# Patient Record
Sex: MALE | Race: WHITE | NOT HISPANIC OR LATINO | Employment: FULL TIME | URBAN - METROPOLITAN AREA
[De-identification: names, ages, dates, MRNs, and addresses within clinical notes are randomized per-mention and may not be internally consistent; named-entity substitution may affect disease eponyms.]

---

## 2022-10-03 ENCOUNTER — APPOINTMENT (OUTPATIENT)
Dept: CARDIOLOGY | Facility: MEDICAL CENTER | Age: 52
DRG: 246 | End: 2022-10-03
Attending: PHYSICIAN ASSISTANT
Payer: COMMERCIAL

## 2022-10-03 ENCOUNTER — HOSPITAL ENCOUNTER (INPATIENT)
Facility: MEDICAL CENTER | Age: 52
LOS: 2 days | DRG: 246 | End: 2022-10-05
Attending: EMERGENCY MEDICINE | Admitting: INTERNAL MEDICINE
Payer: COMMERCIAL

## 2022-10-03 ENCOUNTER — APPOINTMENT (OUTPATIENT)
Dept: CARDIOLOGY | Facility: MEDICAL CENTER | Age: 52
DRG: 246 | End: 2022-10-03
Payer: COMMERCIAL

## 2022-10-03 ENCOUNTER — APPOINTMENT (OUTPATIENT)
Dept: RADIOLOGY | Facility: MEDICAL CENTER | Age: 52
DRG: 246 | End: 2022-10-03
Payer: COMMERCIAL

## 2022-10-03 DIAGNOSIS — I21.11 ST ELEVATION MYOCARDIAL INFARCTION INVOLVING RIGHT CORONARY ARTERY (HCC): ICD-10-CM

## 2022-10-03 DIAGNOSIS — I21.3 ST ELEVATION MYOCARDIAL INFARCTION (STEMI), UNSPECIFIED ARTERY (HCC): ICD-10-CM

## 2022-10-03 DIAGNOSIS — R07.9 ACUTE CHEST PAIN: ICD-10-CM

## 2022-10-03 DIAGNOSIS — F17.200 TOBACCO DEPENDENCE: ICD-10-CM

## 2022-10-03 PROBLEM — R73.9 HYPERGLYCEMIA: Status: ACTIVE | Noted: 2022-10-03

## 2022-10-03 LAB
ACT BLD: 242 SEC (ref 74–137)
ACT BLD: 312 SEC (ref 74–137)
ALBUMIN SERPL BCP-MCNC: 4.3 G/DL (ref 3.2–4.9)
ALBUMIN/GLOB SERPL: 1.7 G/DL
ALP SERPL-CCNC: 99 U/L (ref 30–99)
ALT SERPL-CCNC: 15 U/L (ref 2–50)
ANION GAP SERPL CALC-SCNC: 16 MMOL/L (ref 7–16)
ANION GAP SERPL CALC-SCNC: 18 MMOL/L (ref 7–16)
APPEARANCE UR: CLEAR
APTT PPP: 30 SEC (ref 24.7–36)
AST SERPL-CCNC: 29 U/L (ref 12–45)
B-OH-BUTYR SERPL-MCNC: 0.45 MMOL/L (ref 0.02–0.27)
BACTERIA #/AREA URNS HPF: NEGATIVE /HPF
BASOPHILS # BLD AUTO: 0.9 % (ref 0–1.8)
BASOPHILS # BLD: 0.03 K/UL (ref 0–0.12)
BILIRUB SERPL-MCNC: 0.7 MG/DL (ref 0.1–1.5)
BILIRUB UR QL STRIP.AUTO: NEGATIVE
BUN SERPL-MCNC: 16 MG/DL (ref 8–22)
BUN SERPL-MCNC: 17 MG/DL (ref 8–22)
CALCIUM SERPL-MCNC: 9 MG/DL (ref 8.5–10.5)
CALCIUM SERPL-MCNC: 9.1 MG/DL (ref 8.5–10.5)
CHLORIDE SERPL-SCNC: 104 MMOL/L (ref 96–112)
CHLORIDE SERPL-SCNC: 98 MMOL/L (ref 96–112)
CO2 SERPL-SCNC: 15 MMOL/L (ref 20–33)
CO2 SERPL-SCNC: 16 MMOL/L (ref 20–33)
COLOR UR: YELLOW
CREAT SERPL-MCNC: 0.7 MG/DL (ref 0.5–1.4)
CREAT SERPL-MCNC: 0.85 MG/DL (ref 0.5–1.4)
EKG IMPRESSION: NORMAL
EOSINOPHIL # BLD AUTO: 0.04 K/UL (ref 0–0.51)
EOSINOPHIL NFR BLD: 1.2 % (ref 0–6.9)
EPI CELLS #/AREA URNS HPF: NEGATIVE /HPF
ERYTHROCYTE [DISTWIDTH] IN BLOOD BY AUTOMATED COUNT: 42.5 FL (ref 35.9–50)
EST. AVERAGE GLUCOSE BLD GHB EST-MCNC: 183 MG/DL
GFR SERPLBLD CREATININE-BSD FMLA CKD-EPI: 103 ML/MIN/1.73 M 2
GFR SERPLBLD CREATININE-BSD FMLA CKD-EPI: 109 ML/MIN/1.73 M 2
GLOBULIN SER CALC-MCNC: 2.6 G/DL (ref 1.9–3.5)
GLUCOSE BLD STRIP.AUTO-MCNC: 276 MG/DL (ref 65–99)
GLUCOSE BLD STRIP.AUTO-MCNC: 291 MG/DL (ref 65–99)
GLUCOSE BLD STRIP.AUTO-MCNC: 294 MG/DL (ref 65–99)
GLUCOSE BLD STRIP.AUTO-MCNC: 299 MG/DL (ref 65–99)
GLUCOSE SERPL-MCNC: 262 MG/DL (ref 65–99)
GLUCOSE SERPL-MCNC: 391 MG/DL (ref 65–99)
GLUCOSE UR STRIP.AUTO-MCNC: >=1000 MG/DL
HBA1C MFR BLD: 8 % (ref 4–5.6)
HCT VFR BLD AUTO: 44.7 % (ref 42–52)
HGB BLD-MCNC: 16.5 G/DL (ref 14–18)
HYALINE CASTS #/AREA URNS LPF: ABNORMAL /LPF
IMM GRANULOCYTES # BLD AUTO: 0.01 K/UL (ref 0–0.11)
IMM GRANULOCYTES NFR BLD AUTO: 0.3 % (ref 0–0.9)
INR PPP: 0.96 (ref 0.87–1.13)
KETONES UR STRIP.AUTO-MCNC: 40 MG/DL
LACTATE SERPL-SCNC: 2.9 MMOL/L (ref 0.5–2)
LEUKOCYTE ESTERASE UR QL STRIP.AUTO: NEGATIVE
LIPASE SERPL-CCNC: 76 U/L (ref 11–82)
LV EJECT FRACT  99904: 60
LV EJECT FRACT MOD 2C 99903: 59.28
LV EJECT FRACT MOD 4C 99902: 62.52
LV EJECT FRACT MOD BP 99901: 59.97
LYMPHOCYTES # BLD AUTO: 1.57 K/UL (ref 1–4.8)
LYMPHOCYTES NFR BLD: 47.7 % (ref 22–41)
MAGNESIUM SERPL-MCNC: 1.8 MG/DL (ref 1.5–2.5)
MCH RBC QN AUTO: 31.1 PG (ref 27–33)
MCHC RBC AUTO-ENTMCNC: 36.9 G/DL (ref 33.7–35.3)
MCV RBC AUTO: 84.2 FL (ref 81.4–97.8)
MICRO URNS: ABNORMAL
MONOCYTES # BLD AUTO: 0.57 K/UL (ref 0–0.85)
MONOCYTES NFR BLD AUTO: 17.3 % (ref 0–13.4)
NEUTROPHILS # BLD AUTO: 1.07 K/UL (ref 1.82–7.42)
NEUTROPHILS NFR BLD: 32.6 % (ref 44–72)
NITRITE UR QL STRIP.AUTO: NEGATIVE
NRBC # BLD AUTO: 0 K/UL
NRBC BLD-RTO: 0 /100 WBC
NT-PROBNP SERPL IA-MCNC: 21 PG/ML (ref 0–125)
PH UR STRIP.AUTO: 6 [PH] (ref 5–8)
PHOSPHATE SERPL-MCNC: 2.1 MG/DL (ref 2.5–4.5)
PLATELET # BLD AUTO: 186 K/UL (ref 164–446)
PMV BLD AUTO: 10.7 FL (ref 9–12.9)
POTASSIUM SERPL-SCNC: 4 MMOL/L (ref 3.6–5.5)
POTASSIUM SERPL-SCNC: 4.1 MMOL/L (ref 3.6–5.5)
PROT SERPL-MCNC: 6.9 G/DL (ref 6–8.2)
PROT UR QL STRIP: NEGATIVE MG/DL
PROTHROMBIN TIME: 12.7 SEC (ref 12–14.6)
RBC # BLD AUTO: 5.31 M/UL (ref 4.7–6.1)
RBC # URNS HPF: ABNORMAL /HPF
RBC UR QL AUTO: ABNORMAL
SODIUM SERPL-SCNC: 132 MMOL/L (ref 135–145)
SODIUM SERPL-SCNC: 135 MMOL/L (ref 135–145)
SP GR UR STRIP.AUTO: >=1.045
TROPONIN T SERPL-MCNC: 1672 NG/L (ref 6–19)
TROPONIN T SERPL-MCNC: 18 NG/L (ref 6–19)
UROBILINOGEN UR STRIP.AUTO-MCNC: 1 MG/DL
WBC # BLD AUTO: 3.3 K/UL (ref 4.8–10.8)
WBC #/AREA URNS HPF: ABNORMAL /HPF

## 2022-10-03 PROCEDURE — B240ZZ3 ULTRASONOGRAPHY OF SINGLE CORONARY ARTERY, INTRAVASCULAR: ICD-10-PCS | Performed by: INTERNAL MEDICINE

## 2022-10-03 PROCEDURE — 99221 1ST HOSP IP/OBS SF/LOW 40: CPT | Performed by: INTERNAL MEDICINE

## 2022-10-03 PROCEDURE — 93005 ELECTROCARDIOGRAM TRACING: CPT | Performed by: EMERGENCY MEDICINE

## 2022-10-03 PROCEDURE — 700111 HCHG RX REV CODE 636 W/ 250 OVERRIDE (IP): Performed by: EMERGENCY MEDICINE

## 2022-10-03 PROCEDURE — 36415 COLL VENOUS BLD VENIPUNCTURE: CPT

## 2022-10-03 PROCEDURE — 85730 THROMBOPLASTIN TIME PARTIAL: CPT

## 2022-10-03 PROCEDURE — 700111 HCHG RX REV CODE 636 W/ 250 OVERRIDE (IP): Performed by: INTERNAL MEDICINE

## 2022-10-03 PROCEDURE — 83690 ASSAY OF LIPASE: CPT

## 2022-10-03 PROCEDURE — 96374 THER/PROPH/DIAG INJ IV PUSH: CPT

## 2022-10-03 PROCEDURE — 700102 HCHG RX REV CODE 250 W/ 637 OVERRIDE(OP): Performed by: HOSPITALIST

## 2022-10-03 PROCEDURE — 700111 HCHG RX REV CODE 636 W/ 250 OVERRIDE (IP)

## 2022-10-03 PROCEDURE — 82962 GLUCOSE BLOOD TEST: CPT | Mod: 91

## 2022-10-03 PROCEDURE — 84100 ASSAY OF PHOSPHORUS: CPT

## 2022-10-03 PROCEDURE — 93458 L HRT ARTERY/VENTRICLE ANGIO: CPT | Mod: 26,59 | Performed by: INTERNAL MEDICINE

## 2022-10-03 PROCEDURE — 93010 ELECTROCARDIOGRAM REPORT: CPT | Mod: 59 | Performed by: INTERNAL MEDICINE

## 2022-10-03 PROCEDURE — 96375 TX/PRO/DX INJ NEW DRUG ADDON: CPT

## 2022-10-03 PROCEDURE — 93010 ELECTROCARDIOGRAM REPORT: CPT | Mod: 59,76 | Performed by: INTERNAL MEDICINE

## 2022-10-03 PROCEDURE — 71045 X-RAY EXAM CHEST 1 VIEW: CPT

## 2022-10-03 PROCEDURE — A9270 NON-COVERED ITEM OR SERVICE: HCPCS | Performed by: INTERNAL MEDICINE

## 2022-10-03 PROCEDURE — 83735 ASSAY OF MAGNESIUM: CPT

## 2022-10-03 PROCEDURE — 700117 HCHG RX CONTRAST REV CODE 255: Performed by: INTERNAL MEDICINE

## 2022-10-03 PROCEDURE — 85025 COMPLETE CBC W/AUTO DIFF WBC: CPT

## 2022-10-03 PROCEDURE — 92978 ENDOLUMINL IVUS OCT C 1ST: CPT | Mod: 26,RC | Performed by: INTERNAL MEDICINE

## 2022-10-03 PROCEDURE — 82010 KETONE BODYS QUAN: CPT

## 2022-10-03 PROCEDURE — B2151ZZ FLUOROSCOPY OF LEFT HEART USING LOW OSMOLAR CONTRAST: ICD-10-PCS | Performed by: INTERNAL MEDICINE

## 2022-10-03 PROCEDURE — 0270356 DILATION OF CORONARY ARTERY, ONE ARTERY, BIFURCATION, WITH TWO DRUG-ELUTING INTRALUMINAL DEVICES, PERCUTANEOUS APPROACH: ICD-10-PCS | Performed by: INTERNAL MEDICINE

## 2022-10-03 PROCEDURE — 700102 HCHG RX REV CODE 250 W/ 637 OVERRIDE(OP)

## 2022-10-03 PROCEDURE — 93306 TTE W/DOPPLER COMPLETE: CPT | Mod: 26 | Performed by: INTERNAL MEDICINE

## 2022-10-03 PROCEDURE — 85610 PROTHROMBIN TIME: CPT

## 2022-10-03 PROCEDURE — 700101 HCHG RX REV CODE 250

## 2022-10-03 PROCEDURE — 770020 HCHG ROOM/CARE - TELE (206)

## 2022-10-03 PROCEDURE — 84484 ASSAY OF TROPONIN QUANT: CPT | Mod: 91

## 2022-10-03 PROCEDURE — 83605 ASSAY OF LACTIC ACID: CPT

## 2022-10-03 PROCEDURE — 93005 ELECTROCARDIOGRAM TRACING: CPT | Performed by: INTERNAL MEDICINE

## 2022-10-03 PROCEDURE — 700102 HCHG RX REV CODE 250 W/ 637 OVERRIDE(OP): Performed by: INTERNAL MEDICINE

## 2022-10-03 PROCEDURE — 99285 EMERGENCY DEPT VISIT HI MDM: CPT

## 2022-10-03 PROCEDURE — C1769 GUIDE WIRE: HCPCS

## 2022-10-03 PROCEDURE — 99152 MOD SED SAME PHYS/QHP 5/>YRS: CPT | Performed by: INTERNAL MEDICINE

## 2022-10-03 PROCEDURE — 83880 ASSAY OF NATRIURETIC PEPTIDE: CPT

## 2022-10-03 PROCEDURE — 92929 PR PRQ TRLUML CORONARY STENT W/ANGIO ADDL ART/BRNCH: CPT | Mod: RC | Performed by: INTERNAL MEDICINE

## 2022-10-03 PROCEDURE — 4A023N7 MEASUREMENT OF CARDIAC SAMPLING AND PRESSURE, LEFT HEART, PERCUTANEOUS APPROACH: ICD-10-PCS | Performed by: INTERNAL MEDICINE

## 2022-10-03 PROCEDURE — A9270 NON-COVERED ITEM OR SERVICE: HCPCS

## 2022-10-03 PROCEDURE — A9270 NON-COVERED ITEM OR SERVICE: HCPCS | Performed by: HOSPITALIST

## 2022-10-03 PROCEDURE — 81001 URINALYSIS AUTO W/SCOPE: CPT

## 2022-10-03 PROCEDURE — 85347 COAGULATION TIME ACTIVATED: CPT

## 2022-10-03 PROCEDURE — 93306 TTE W/DOPPLER COMPLETE: CPT

## 2022-10-03 PROCEDURE — B2111ZZ FLUOROSCOPY OF MULTIPLE CORONARY ARTERIES USING LOW OSMOLAR CONTRAST: ICD-10-PCS | Performed by: INTERNAL MEDICINE

## 2022-10-03 PROCEDURE — 80048 BASIC METABOLIC PNL TOTAL CA: CPT

## 2022-10-03 PROCEDURE — 83036 HEMOGLOBIN GLYCOSYLATED A1C: CPT

## 2022-10-03 PROCEDURE — 92941 PRQ TRLML REVSC TOT OCCL AMI: CPT | Mod: RC | Performed by: INTERNAL MEDICINE

## 2022-10-03 PROCEDURE — 80053 COMPREHEN METABOLIC PANEL: CPT

## 2022-10-03 RX ORDER — CARVEDILOL 6.25 MG/1
6.25 TABLET ORAL 2 TIMES DAILY WITH MEALS
Status: DISCONTINUED | OUTPATIENT
Start: 2022-10-03 | End: 2022-10-05

## 2022-10-03 RX ORDER — HEPARIN SODIUM 1000 [USP'U]/ML
INJECTION, SOLUTION INTRAVENOUS; SUBCUTANEOUS
Status: COMPLETED
Start: 2022-10-03 | End: 2022-10-03

## 2022-10-03 RX ORDER — ONDANSETRON 4 MG/1
4 TABLET, ORALLY DISINTEGRATING ORAL EVERY 4 HOURS PRN
Status: DISCONTINUED | OUTPATIENT
Start: 2022-10-03 | End: 2022-10-05

## 2022-10-03 RX ORDER — NICOTINE 21 MG/24HR
21 PATCH, TRANSDERMAL 24 HOURS TRANSDERMAL
Status: DISCONTINUED | OUTPATIENT
Start: 2022-10-03 | End: 2022-10-05 | Stop reason: HOSPADM

## 2022-10-03 RX ORDER — LIDOCAINE HYDROCHLORIDE 20 MG/ML
INJECTION, SOLUTION INFILTRATION; PERINEURAL
Status: COMPLETED
Start: 2022-10-03 | End: 2022-10-03

## 2022-10-03 RX ORDER — MIDAZOLAM HYDROCHLORIDE 1 MG/ML
INJECTION INTRAMUSCULAR; INTRAVENOUS
Status: COMPLETED
Start: 2022-10-03 | End: 2022-10-03

## 2022-10-03 RX ORDER — LISINOPRIL 5 MG/1
5 TABLET ORAL
Status: DISCONTINUED | OUTPATIENT
Start: 2022-10-03 | End: 2022-10-05

## 2022-10-03 RX ORDER — PROCHLORPERAZINE EDISYLATE 5 MG/ML
5-10 INJECTION INTRAMUSCULAR; INTRAVENOUS EVERY 4 HOURS PRN
Status: DISCONTINUED | OUTPATIENT
Start: 2022-10-03 | End: 2022-10-05 | Stop reason: HOSPADM

## 2022-10-03 RX ORDER — BISACODYL 10 MG
10 SUPPOSITORY, RECTAL RECTAL
Status: DISCONTINUED | OUTPATIENT
Start: 2022-10-03 | End: 2022-10-05

## 2022-10-03 RX ORDER — NITROGLYCERIN 0.4 MG/1
0.4 TABLET SUBLINGUAL
Status: DISCONTINUED | OUTPATIENT
Start: 2022-10-03 | End: 2022-10-05 | Stop reason: HOSPADM

## 2022-10-03 RX ORDER — ONDANSETRON 2 MG/ML
4 INJECTION INTRAMUSCULAR; INTRAVENOUS EVERY 4 HOURS PRN
Status: DISCONTINUED | OUTPATIENT
Start: 2022-10-03 | End: 2022-10-05 | Stop reason: HOSPADM

## 2022-10-03 RX ORDER — PRASUGREL 10 MG/1
10 TABLET, FILM COATED ORAL DAILY
Status: DISCONTINUED | OUTPATIENT
Start: 2022-10-04 | End: 2022-10-05

## 2022-10-03 RX ORDER — ENOXAPARIN SODIUM 100 MG/ML
40 INJECTION SUBCUTANEOUS DAILY
Status: DISCONTINUED | OUTPATIENT
Start: 2022-10-03 | End: 2022-10-05 | Stop reason: HOSPADM

## 2022-10-03 RX ORDER — MORPHINE SULFATE 4 MG/ML
4 INJECTION INTRAVENOUS ONCE
Status: COMPLETED | OUTPATIENT
Start: 2022-10-03 | End: 2022-10-03

## 2022-10-03 RX ORDER — ONDANSETRON 2 MG/ML
4 INJECTION INTRAMUSCULAR; INTRAVENOUS EVERY 4 HOURS PRN
Status: DISCONTINUED | OUTPATIENT
Start: 2022-10-03 | End: 2022-10-03

## 2022-10-03 RX ORDER — VERAPAMIL HYDROCHLORIDE 2.5 MG/ML
INJECTION, SOLUTION INTRAVENOUS
Status: COMPLETED
Start: 2022-10-03 | End: 2022-10-03

## 2022-10-03 RX ORDER — ACETAMINOPHEN 325 MG/1
650 TABLET ORAL EVERY 6 HOURS PRN
Status: DISCONTINUED | OUTPATIENT
Start: 2022-10-03 | End: 2022-10-05

## 2022-10-03 RX ORDER — PRASUGREL 10 MG/1
TABLET, FILM COATED ORAL
Status: COMPLETED
Start: 2022-10-03 | End: 2022-10-03

## 2022-10-03 RX ORDER — ATORVASTATIN CALCIUM 40 MG/1
40 TABLET, FILM COATED ORAL EVERY EVENING
Status: DISCONTINUED | OUTPATIENT
Start: 2022-10-03 | End: 2022-10-04

## 2022-10-03 RX ORDER — DEXTROSE MONOHYDRATE 25 G/50ML
25 INJECTION, SOLUTION INTRAVENOUS
Status: DISCONTINUED | OUTPATIENT
Start: 2022-10-03 | End: 2022-10-05 | Stop reason: HOSPADM

## 2022-10-03 RX ORDER — POLYETHYLENE GLYCOL 3350 17 G/17G
1 POWDER, FOR SOLUTION ORAL
Status: DISCONTINUED | OUTPATIENT
Start: 2022-10-03 | End: 2022-10-05

## 2022-10-03 RX ORDER — HEPARIN SODIUM 200 [USP'U]/100ML
INJECTION, SOLUTION INTRAVENOUS
Status: COMPLETED
Start: 2022-10-03 | End: 2022-10-03

## 2022-10-03 RX ORDER — AMOXICILLIN 250 MG
2 CAPSULE ORAL 2 TIMES DAILY
Status: DISCONTINUED | OUTPATIENT
Start: 2022-10-03 | End: 2022-10-05

## 2022-10-03 RX ORDER — PRASUGREL 10 MG/1
60 TABLET, FILM COATED ORAL ONCE
Status: DISCONTINUED | OUTPATIENT
Start: 2022-10-03 | End: 2022-10-03

## 2022-10-03 RX ORDER — PROMETHAZINE HYDROCHLORIDE 25 MG/1
12.5-25 TABLET ORAL EVERY 4 HOURS PRN
Status: DISCONTINUED | OUTPATIENT
Start: 2022-10-03 | End: 2022-10-05

## 2022-10-03 RX ORDER — PROMETHAZINE HYDROCHLORIDE 12.5 MG/1
12.5-25 SUPPOSITORY RECTAL EVERY 4 HOURS PRN
Status: DISCONTINUED | OUTPATIENT
Start: 2022-10-03 | End: 2022-10-05 | Stop reason: HOSPADM

## 2022-10-03 RX ORDER — PHENYLEPHRINE HCL IN 0.9% NACL 0.5 MG/5ML
SYRINGE (ML) INTRAVENOUS
Status: COMPLETED
Start: 2022-10-03 | End: 2022-10-03

## 2022-10-03 RX ADMIN — CARVEDILOL 6.25 MG: 6.25 TABLET, FILM COATED ORAL at 07:59

## 2022-10-03 RX ADMIN — INSULIN HUMAN 7 UNITS: 100 INJECTION, SOLUTION PARENTERAL at 20:22

## 2022-10-03 RX ADMIN — MORPHINE SULFATE 4 MG: 4 INJECTION, SOLUTION INTRAMUSCULAR; INTRAVENOUS at 04:39

## 2022-10-03 RX ADMIN — HEPARIN SODIUM 2000 UNITS: 200 INJECTION, SOLUTION INTRAVENOUS at 05:15

## 2022-10-03 RX ADMIN — ENOXAPARIN SODIUM 40 MG: 40 INJECTION SUBCUTANEOUS at 06:48

## 2022-10-03 RX ADMIN — HEPARIN SODIUM: 1000 INJECTION, SOLUTION INTRAVENOUS; SUBCUTANEOUS at 05:13

## 2022-10-03 RX ADMIN — FENTANYL CITRATE 100 MCG: 50 INJECTION, SOLUTION INTRAMUSCULAR; INTRAVENOUS at 05:13

## 2022-10-03 RX ADMIN — MIDAZOLAM HYDROCHLORIDE 2 MG: 1 INJECTION, SOLUTION INTRAMUSCULAR; INTRAVENOUS at 05:13

## 2022-10-03 RX ADMIN — ENOXAPARIN SODIUM 40 MG: 40 INJECTION SUBCUTANEOUS at 17:52

## 2022-10-03 RX ADMIN — NICOTINE TRANSDERMAL SYSTEM 21 MG: 21 PATCH, EXTENDED RELEASE TRANSDERMAL at 06:49

## 2022-10-03 RX ADMIN — NITROGLYCERIN 0.4 MG: 0.4 TABLET, ORALLY DISINTEGRATING SUBLINGUAL at 14:55

## 2022-10-03 RX ADMIN — MIDAZOLAM HYDROCHLORIDE 1 MG: 1 INJECTION, SOLUTION INTRAMUSCULAR; INTRAVENOUS at 05:15

## 2022-10-03 RX ADMIN — PRASUGREL 60 MG: 10 TABLET, FILM COATED ORAL at 05:24

## 2022-10-03 RX ADMIN — ATORVASTATIN CALCIUM 40 MG: 40 TABLET, FILM COATED ORAL at 17:46

## 2022-10-03 RX ADMIN — ONDANSETRON 4 MG: 2 INJECTION INTRAMUSCULAR; INTRAVENOUS at 04:39

## 2022-10-03 RX ADMIN — VERAPAMIL HYDROCHLORIDE 5 MG: 2.5 INJECTION, SOLUTION INTRAVENOUS at 05:13

## 2022-10-03 RX ADMIN — ASPIRIN 81 MG: 81 TABLET, COATED ORAL at 06:49

## 2022-10-03 RX ADMIN — IOHEXOL 110 ML: 350 INJECTION, SOLUTION INTRAVENOUS at 05:15

## 2022-10-03 RX ADMIN — INSULIN GLARGINE-YFGN 20 UNITS: 100 INJECTION, SOLUTION SUBCUTANEOUS at 17:47

## 2022-10-03 RX ADMIN — CARVEDILOL 6.25 MG: 6.25 TABLET, FILM COATED ORAL at 17:45

## 2022-10-03 RX ADMIN — LISINOPRIL 5 MG: 5 TABLET ORAL at 06:49

## 2022-10-03 RX ADMIN — INSULIN HUMAN 7 UNITS: 100 INJECTION, SOLUTION PARENTERAL at 11:35

## 2022-10-03 RX ADMIN — LIDOCAINE HYDROCHLORIDE: 20 INJECTION, SOLUTION INFILTRATION; PERINEURAL at 04:45

## 2022-10-03 RX ADMIN — NITROGLYCERIN 10 ML: 20 INJECTION INTRAVENOUS at 05:15

## 2022-10-03 RX ADMIN — INSULIN HUMAN 7 UNITS: 100 INJECTION, SOLUTION PARENTERAL at 07:12

## 2022-10-03 RX ADMIN — INSULIN HUMAN 7 UNITS: 100 INJECTION, SOLUTION PARENTERAL at 17:47

## 2022-10-03 RX ADMIN — INSULIN HUMAN 5 UNITS: 100 INJECTION, SOLUTION PARENTERAL at 08:00

## 2022-10-03 ASSESSMENT — COGNITIVE AND FUNCTIONAL STATUS - GENERAL
MOBILITY SCORE: 24
SUGGESTED CMS G CODE MODIFIER MOBILITY: CH
SUGGESTED CMS G CODE MODIFIER DAILY ACTIVITY: CH
DAILY ACTIVITIY SCORE: 24

## 2022-10-03 ASSESSMENT — ENCOUNTER SYMPTOMS
BACK PAIN: 0
VOMITING: 0
EYES NEGATIVE: 1
NERVOUS/ANXIOUS: 0
HEMOPTYSIS: 0
PHOTOPHOBIA: 0
FLANK PAIN: 0
NEUROLOGICAL NEGATIVE: 1
MUSCULOSKELETAL NEGATIVE: 1
GASTROINTESTINAL NEGATIVE: 1
FOCAL WEAKNESS: 0
HEARTBURN: 0
PALPITATIONS: 0
DOUBLE VISION: 0
NERVOUS/ANXIOUS: 1
BLURRED VISION: 0
HEADACHES: 0
SPEECH CHANGE: 0
HALLUCINATIONS: 0
BRUISES/BLEEDS EASILY: 0
NAUSEA: 0
WEIGHT LOSS: 0
NECK PAIN: 0
CHILLS: 0
POLYDIPSIA: 0
RESPIRATORY NEGATIVE: 1
COUGH: 0
SPUTUM PRODUCTION: 0
ORTHOPNEA: 0
FEVER: 0
TREMORS: 0

## 2022-10-03 ASSESSMENT — LIFESTYLE VARIABLES
CONSUMPTION TOTAL: NEGATIVE
TOTAL SCORE: 0
HAVE PEOPLE ANNOYED YOU BY CRITICIZING YOUR DRINKING: NO
ALCOHOL_USE: YES
EVER FELT BAD OR GUILTY ABOUT YOUR DRINKING: NO
HAVE YOU EVER FELT YOU SHOULD CUT DOWN ON YOUR DRINKING: NO
DOES PATIENT WANT TO STOP DRINKING: NO
ON A TYPICAL DAY WHEN YOU DRINK ALCOHOL HOW MANY DRINKS DO YOU HAVE: 1
AVERAGE NUMBER OF DAYS PER WEEK YOU HAVE A DRINK CONTAINING ALCOHOL: 0
EVER HAD A DRINK FIRST THING IN THE MORNING TO STEADY YOUR NERVES TO GET RID OF A HANGOVER: NO
HOW MANY TIMES IN THE PAST YEAR HAVE YOU HAD 5 OR MORE DRINKS IN A DAY: 0
SUBSTANCE_ABUSE: 0
SUBSTANCE_ABUSE: 1
TOTAL SCORE: 0
TOTAL SCORE: 0

## 2022-10-03 ASSESSMENT — PATIENT HEALTH QUESTIONNAIRE - PHQ9
2. FEELING DOWN, DEPRESSED, IRRITABLE, OR HOPELESS: NOT AT ALL
SUM OF ALL RESPONSES TO PHQ9 QUESTIONS 1 AND 2: 0
1. LITTLE INTEREST OR PLEASURE IN DOING THINGS: NOT AT ALL

## 2022-10-03 ASSESSMENT — FIBROSIS 4 INDEX
FIB4 SCORE: 2.17
FIB4 SCORE: 2.17

## 2022-10-03 ASSESSMENT — PAIN DESCRIPTION - PAIN TYPE
TYPE: ACUTE PAIN
TYPE: ACUTE PAIN

## 2022-10-03 NOTE — PROGRESS NOTES
Patient up to unit from [facility]. Patient is  A&Ox4, no complaints of pain, VSS, no signs of distress. Tele monitor placed and verified by monitor room. All questions and concerns answered, call light in reach, will continue to monitor.

## 2022-10-03 NOTE — H&P
Hospital Medicine History & Physical Note    Date of Service  10/3/2022    Primary Care Physician  No primary care provider on file.    Consultants  Interventional cardiology    Code Status  Full Code    Chief Complaint  Chest pain    History of Presenting Illness  Jace Mohr is a 52 y.o. male with past medical history of obesity, smoking, who presented 10/3/2022 with chest pain, that started at 2:30 AM describing as 10 out of 10 pressure in the left chest without alleviating or aggravating factors.  EMS recorded ST elevation in anterior leads on EKG and he was given aspirin and nitroglycerin.  Upon arrival, patient continues having chest pain 5 out of 10.  Surprisingly, his troponin and BNP  not elevated.  Code STEMI activated and patient was taken to Cath Lab, report is pending, but per imagings review it appears that patient has occlusion of right coronary artery.  Patient will be admitted to IM for close observation      I discussed the plan of care with charge RN.    Review of Systems  Review of Systems   Constitutional:  Negative for chills, fever and weight loss.   HENT:  Negative for ear pain, hearing loss and tinnitus.    Eyes:  Negative for blurred vision, double vision and photophobia.   Respiratory:  Negative for cough, hemoptysis and sputum production.    Cardiovascular:  Positive for chest pain. Negative for palpitations and orthopnea.   Gastrointestinal:  Negative for heartburn, nausea and vomiting.   Genitourinary:  Negative for dysuria, flank pain, frequency and hematuria.   Musculoskeletal:  Positive for joint pain. Negative for back pain and neck pain.   Skin:  Negative for itching and rash.   Neurological:  Negative for tremors, speech change, focal weakness and headaches.   Endo/Heme/Allergies:  Negative for environmental allergies and polydipsia. Does not bruise/bleed easily.   Psychiatric/Behavioral:  Negative for hallucinations and substance abuse. The patient is not nervous/anxious.       Past Medical History   has no past medical history on file.    Surgical History   has no past surgical history on file.     Family History     Family history reviewed with patient. There is no family history that is pertinent to the chief complaint.     Social History   reports that he has been smoking cigarettes. He does not have any smokeless tobacco history on file.    Allergies  Not on File    Medications  None       Physical Exam                             Physical Exam  Vitals and nursing note reviewed.   Constitutional:       General: He is not in acute distress.     Appearance: He is obese. He is diaphoretic.   HENT:      Head: Normocephalic and atraumatic.      Nose: Nose normal.      Mouth/Throat:      Mouth: Mucous membranes are moist.   Eyes:      Extraocular Movements: Extraocular movements intact.      Pupils: Pupils are equal, round, and reactive to light.   Cardiovascular:      Rate and Rhythm: Normal rate and regular rhythm.   Pulmonary:      Effort: Pulmonary effort is normal.      Breath sounds: Normal breath sounds.   Abdominal:      General: Abdomen is flat. There is no distension.      Tenderness: There is no abdominal tenderness.   Musculoskeletal:         General: No swelling or deformity. Normal range of motion.      Cervical back: Normal range of motion and neck supple.   Skin:     General: Skin is warm.   Neurological:      General: No focal deficit present.      Mental Status: He is alert and oriented to person, place, and time.   Psychiatric:         Mood and Affect: Mood normal.         Behavior: Behavior normal.       Laboratory:  Recent Labs     10/03/22  0435   WBC 3.3*   RBC 5.31   HEMOGLOBIN 16.5   HEMATOCRIT 44.7   MCV 84.2   MCH 31.1   MCHC 36.9*   RDW 42.5   PLATELETCT 186   MPV 10.7         No results for input(s): ALTSGPT, ASTSGOT, ALKPHOSPHAT, TBILIRUBIN, DBILIRUBIN, GAMMAGT, AMYLASE, LIPASE, ALB, PREALBUMIN, GLUCOSE in the last 72 hours.      No results for input(s):  NTPROBNP in the last 72 hours.      No results for input(s): TROPONINT in the last 72 hours.    Imaging:  CL-LEFT HEART CATHETERIZATION WITH POSSIBLE INTERVENTION    (Results Pending)   DX-CHEST-PORTABLE (1 VIEW)    (Results Pending)   EC-ECHOCARDIOGRAM COMPLETE W/O CONT    (Results Pending)       X-Ray:  I have personally reviewed the images and compared with prior images.    Assessment/Plan:  Justification for Admission Status  I anticipate this patient will require at least two midnights for appropriate medical management, necessitating inpatient admission because STEMI    Patient will need a ICU (Adult and Pediatrics) bed on CARDIOLOGY service .  The need is secondary to Stemi.    * STEMI (ST elevation myocardial infarction) (HCC)- (present on admission)  Assessment & Plan  Presented with pressure-like chest pain and ST elevation in inferior leads  Initial troponin is not elevated  Patient was taken to Cath Lab emergently for coronary angiography and revascularization  Plan: Admit to IMCU for close monitoring with telemetry  Start medical optimization with ACE inhibitor, beta-blocker, atorvastatin  Nitroglycerin and morphine as needed for chest pain  Antiplatelets per cardiology  Transthoracic echo  Cardiac rehab and lifestyle modification, smoking cessation    Nicotine dependence  Assessment & Plan  Smoking cessation counseling, nicotine patch    Hyperglycemia  Assessment & Plan  Random blood sugar was 391, bicarb 16, anion gap 18, that may be consistent with mild DKA secondary to undiagnosed diabetes  Plan: We will give IV insulin 5 units 1 time, and place  on high intensity insulin sliding scale and Lantus  Will obtain BHB, UA, A1c      VTE prophylaxis: enoxaparin ppx

## 2022-10-03 NOTE — ED PROVIDER NOTES
"ED Provider Note    CHIEF COMPLAINT  No chief complaint on file.      HPI  Jace Mohr is a 52 y.o. male who presents to the emergency department by ambulance for chest pain.  Patient works as a , he was asleep in his truck when he developed chest pain, awoke him from sleep at 2:30 AM.  Anterior diffuse chest pain, \"like an elephant sitting on my chest.\"  Shortness of breath.  No palpitations or syncope.  Deep breathing, walking without improvement.  Pain seemed to worsen at which time EMS was called.    Denies history of similar symptoms, cardiac disease, stenting.  Smokes tobacco/cigarettes.  No alcohol or drugs.  No daily medications.  No known medical problems.  Denies recent illness, cough, congestion, fever.  Denies extremity swelling or pain.  No back pain or abdominal pain.  No vomiting despite nausea.    REVIEW OF SYSTEMS  See HPI for further details. All other systems are negative.     PAST MEDICAL HISTORY  Denies    SOCIAL HISTORY  Social History     Tobacco Use    Smoking status: Every Day     Types: Cigarettes    Smokeless tobacco: Not on file   Substance and Sexual Activity    Alcohol use: Not on file    Drug use: Not on file    Sexual activity: Not on file       SURGICAL HISTORY  patient denies any surgical history    CURRENT MEDICATIONS  Home Medications    **Home medications have not yet been reviewed for this encounter**         ALLERGIES  Not on File    PHYSICAL EXAM  VITAL SIGNS: Ht 1.854 m (6' 1\")   Wt 113 kg (250 lb)   BMI 32.98 kg/m²   Pulse ox interpretation: I interpret this pulse ox as normal.  Constitutional: Alert.  Mild distress secondary to discomfort.  Anxious.  HENT: Normocephalic, atraumatic. Bilateral external ears normal, Nose normal.  Eyes: Pupils are equal and reactive, Conjunctiva normal.   Neck: Normal range of motion, Supple.  No JVD.  Lymphatic: No lymphadenopathy noted.   Cardiovascular: Regular rate and rhythm, no murmurs. Distal pulses intact.  No peripheral " edema.  Thorax & Lungs: Normal breath sounds.  No wheezing/rales/ronchi. No increased work of breathing  Abdomen: Soft, non-distended, non-tender to palpation. No palpable or pulsatile masses.   Skin: Warm, Dry, No erythema, No rash.   Musculoskeletal: Good range of motion in all major joints.   Neurologic: Alert and oriented x4.  Speech clear and cohesive.  Moves 4 extremity spontaneously  Psychiatric: Anxious otherwise affect normal, Judgment normal, Mood normal.       DIAGNOSTIC STUDIES / PROCEDURES    LABS  Results for orders placed or performed during the hospital encounter of 10/03/22   CBC WITH DIFFERENTIAL   Result Value Ref Range    WBC 3.3 (L) 4.8 - 10.8 K/uL    RBC 5.31 4.70 - 6.10 M/uL    Hemoglobin 16.5 14.0 - 18.0 g/dL    Hematocrit 44.7 42.0 - 52.0 %    MCV 84.2 81.4 - 97.8 fL    MCH 31.1 27.0 - 33.0 pg    MCHC 36.9 (H) 33.7 - 35.3 g/dL    RDW 42.5 35.9 - 50.0 fL    Platelet Count 186 164 - 446 K/uL    MPV 10.7 9.0 - 12.9 fL    Neutrophils-Polys 32.60 (L) 44.00 - 72.00 %    Lymphocytes 47.70 (H) 22.00 - 41.00 %    Monocytes 17.30 (H) 0.00 - 13.40 %    Eosinophils 1.20 0.00 - 6.90 %    Basophils 0.90 0.00 - 1.80 %    Immature Granulocytes 0.30 0.00 - 0.90 %    Nucleated RBC 0.00 /100 WBC    Neutrophils (Absolute) 1.07 (L) 1.82 - 7.42 K/uL    Lymphs (Absolute) 1.57 1.00 - 4.80 K/uL    Monos (Absolute) 0.57 0.00 - 0.85 K/uL    Eos (Absolute) 0.04 0.00 - 0.51 K/uL    Baso (Absolute) 0.03 0.00 - 0.12 K/uL    Immature Granulocytes (abs) 0.01 0.00 - 0.11 K/uL    NRBC (Absolute) 0.00 K/uL   EKG   Result Value Ref Range    Report       [facility] Emergency Dept.    Test Date:  2022-10-03  Pt Name:    FLY SCOTT                  Department: ER  MRN:        [id number]                      [place]       [facility] [place]:     Male                         Technician: [id number]  :        1970                   Requested By:ER TRIAGE PROTOCOL  Order #:    [id number]                    Johnna COOK:  DOMINGO MARIE DO    Measurements  Intervals                                Axis  Rate:       96                           P:          73  KS:         146                          QRS:        80  QRSD:       108                          T:          86  QT:         382  QTc:        483    Interpretive Statements  Sinus rhythm  Probable left atrial enlargement  Inferior infarct, acute (RCA)  Probable RV involvement, suggest recording right precordial leads  No previous ECG available for comparison  Electronically Signed On 10-3-2022 4:46:16 PDT by DOMINGO MARIE DO       RADIOLOGY  CL-LEFT HEART CATHETERIZATION WITH POSSIBLE INTERVENTION    (Results Pending)   DX-CHEST-PORTABLE (1 VIEW)    (Results Pending)       COURSE & MEDICAL DECISION MAKING  Patient seen evaluated me upon arrival in trauma cells per STEMI protocol.  Cardiology, Dr. Stanton, at bedside.  Chest pain as above, awoke him from sleep 2 hours prior to arrival.  EKG in route with EMS suggest STEMI.  Confirmed on arrival here, ST elevations inferiorly, no reciprocal changes.  Cath Lab activated, interventionalists aware.  He received aspirin and nitro prior to arrival without much improvement.  Blood pressure 144/83, mild tachycardia, heart rate 102 ,add morphine for ongoing discomfort.  Abdominal exam is benign.  Neurologically intact and nonfocal.    Bedside chest x-ray within normal limits, no widened mediastinum, consolidation or effusion.    Cardiology request hospitalist admission.    4:52 AM Dr. Lucas is aware of the patient and agreeable to consultation.    The total critical care time on this patient is 40 minutes, immediate and continuous hemodynamic monitoring and multiple bedside evaluations, resuscitating patient and assessing response to treatment, deciphering test results, speaking with admitting and consulting physician, and arranging for hospital admission. This 40 minutes is exclusive of separately billable  procedures.      FINAL IMPRESSION  (I21.3) ST elevation myocardial infarction (STEMI), unspecified artery (HCC)  (R07.9) Acute chest pain  (F17.200) Tobacco dependence      Electronically signed by: Sil Coe D.O., 10/3/2022 4:44 AM      This dictation was created using voice recognition software. The accuracy of the dictation is limited to the abilities of the software. I expect there may be some errors of grammar and possibly content. The nursing notes were reviewed and certain aspects of this information were incorporated into this note.

## 2022-10-03 NOTE — PROGRESS NOTES
4 Eyes Skin Assessment Completed by VENKATA Leonardo and VENKATA Cain.    Head WDL  Ears WDL  Nose WDL  Mouth WDL  Neck WDL  Breast/Chest WDL  Shoulder Blades WDL  Spine WDL  (R) Arm/Elbow/Hand WDL  (L) Arm/Elbow/Hand WDL  Abdomen WDL  Groin WDL  Scrotum/Coccyx/Buttocks WDL  (R) Leg WDL  (L) Leg WDL  (R) Heel/Foot/Toe WDL  (L) Heel/Foot/Toe WDL          Devices In Places ECG and Compression Dressing      Interventions In Place N/A    Possible Skin Injury No    Pictures Uploaded Into Epic N/A  Wound Consult Placed N/A  RN Wound Prevention Protocol Ordered No

## 2022-10-03 NOTE — PROGRESS NOTES
Brief cardiology update:    Pt is a 5/4 year old man with a past medical history of chronic tobacco use who was admitted with STEMI.    Early this morning he underwent cardiac catheterization early this morning with PCI to the proximal to mid right coronary artery and distal right coronary artery into the posterior descending coronary artery with residual disease in the LAD. An echocardiogram was performed that demonstrated LVEF of 60% with normal size and systolic function and no significant valvular disease.     He is recovering well and has no concerns. No chest pain or palpitations. No shortness of breath, dyspnea on exertion, orthopnea or PND. No lower extremity edema. No dizziness or lightheadedness.    Radial wrist cath site is with clean dry and intact dressing. No oozing or hematoma.     -Continue DAPT with effient and aspirin for 6-12 months.  -Continue high intensity statin. LDL goal <70.   - Continue lisinopril and carvedilol  - Continue observation on telemetry with likely discharge tomorrow.

## 2022-10-03 NOTE — ASSESSMENT & PLAN NOTE
Presented with pressure-like chest pain and ST elevation in inferior leads.  PCI 10/03/22 = Severe obstructive three-vessel coronary artery disease involving the left anterior descending, ramus intermedius, and right coronary arteries. Acute thrombotic occlusion of the proximal right coronary artery successfully treated with one 3.0 x 48 mm Synergy drug-eluting stent. Successful percutaneous coronary intervention to the distal right coronary artery into the posterior descending coronary artery with one 2.5 x 24 mm Synergy drug-eluting stent. Preserved left ventricular systolic function, pressures.    -GDMT, DAPT, ACE inhibitor, beta-blocker, atorvastatin  -Transthoracic echo, EF=60%  -Cardiac rehab and lifestyle modification, smoking cessation  -Cardiology to f/u outpatient and evaluate need for LAD stent. Residual LAD disease for likely staged intervention, as per cardiology. Patient complained of another brief episode of CP post PCI. Resolved as of this point.

## 2022-10-03 NOTE — PROCEDURES
Cardiac Catheterization Procedure Note    DATE: 10/3/2022    : Juan Rodriguez MD    PROCEDURES PERFORMED:  Left heart catheterization with left ventriculography  Coronary angiography  Percutaneous coronary intervention to the proximal to mid right coronary artery  Percutaneous coronary intervention to the distal right coronary artery into the posterior descending coronary artery  Moderate conscious sedation    INDICATIONS:  The patient is a 52-year-old gentleman referred for emergent cardiac catheterization to evaluate cute onset of chest pain with inferior ST elevations on ECG.    CONSENT:  The pertinent alternatives, risks, and benefits of the procedure were explained to the patient. Verbal consent was obtained due to the emergent nature of the procedure.  A timeout was performed prior to beginning procedure.    MEDICATIONS:  Lidocaine  Fentanyl  Midazolam  Nitroglycerin  Verapamil  Heparin  Prasugrel    MODERATE CONSCIOUS SEDATION:  I personally supervised the administration of moderate conscious sedation by the nursing staff for 42 minutes.  Sedation start time: 4:57 AM  Sedation end time: 5:39 AM    CONTRAST: Omnipaque 110 cc    ACCESS: 6-[place] Glidesheath in the right radial artery.    ESTIMATED BLOOD LOSS: 20 cc    COMPLICATIONS: None    PROCEDURE IN DETAIL:  The patient was brought to the cardiac catheterization laboratory emergently.  The skin over the right wrist was prepped and draped in the usual sterile fashion. Lidocaine infiltration was used to anesthetize the tissue over the right radial artery.  Using the micropuncture technique, a 6-[place]an Glidesheath was inserted in the right radial artery.  A 5-Zimbabwean Oli diagnostic catheter was then advanced over a standard J-wire into the left ventricular cavity where it was gently aspirated, flushed, and then used to perform a left ventriculogram prior to being withdrawn across the aortic valve with sequential pressures measured.  This catheter was  "then used to engage the ostium of the left main coronary artery and cineangiograms were obtained in multiple projections for complete evaluation of the left coronary system.    The Oli catheter was then exchanged over a J-wire for a 6-Burkin[place] JR-4 guide catheter.  This catheter was used to re-engage the ostium of the right coronary artery and angiography demonstrated a complete proximal thrombotic occlusion.  A 0.014\" Prowater wire was then advanced across the lesion in the proximal RCA and was placed in the mid aspect of the vessel.  A 2.0 x 12 mm compliant balloon was then advanced over the guidewire and was used to pre-dilate the lesion to nominal pressure.  Following pre-dilation, a 3.0 x 48 mm Synergy drug-eluting stent was advanced over the guidewire and was deployed across the lesion at a maximum pressure of 20 johnny.  After deployment, the patient continued to have significant chest pain and persistent ST elevations on telemetry despite intervention on the acute proximal lesion.  Therefore, the distal RCA lesion was predilated with the 2.0 mm compliant balloon to 10 johnny and then a 2.5 x 24 mm Synergy drug-eluting stent was deployed across the lesion extending into the posterior descending coronary artery at a maximum pressure of 18 johnny.  Cineangiograms were then obtained in orthogonal views after completing intervention, which demonstrated a new severe lesion in the mid to distal vessel that was suspected to be vasospasm.  Intracoronary nitroglycerin was administered and the lesion was evaluated by intravascular ultrasound using an [place] eye IVUS catheter.  IVUS demonstrated a well-expanded distal stent that landed in healthy vessel distally and very mild plaque proximally, a well expanded proximal stent that landed in mild plaque distally and proximally, and no significant lesion in the mid to distal aspect of the vessel.  One final cineangiogram was obtained, which demonstrated resolution of vasospasm with " nitroglycerin and no evidence of wire perforation, thrombus or dissection.  At the completion of the case, all wires, catheters, and sheaths were removed.  A TR band was placed using the patent hemostasis technique.    HEMODYNAMICS:   Aortic pressure: 114/74 mmHg  Pre A-wave pressure: 9 mmHg  No significant aortic gradient on pullback    LEFT VENTRICULOGRAPHY:  Estimated left ventricular ejection fraction 55-60%.  No gross wall motion abnormalities.    CORONARY ANGIOGRAPHY:  The left main coronary artery is patent and trifurcates into the left anterior descending, ramus intermedius, and left circumflex coronary arteries.  The left anterior descending coronary artery is a large, transapical vessel with proximal 20% disease, mid 70% disease at the takeoff of a large first diagonal branch, and distal luminal irregularities.  It supplies a large first diagonal branch with ostial 70% disease.  The left circumflex coronary artery is a large, nondominant vessel with mid 30% disease.  It supplies a small-moderate ramus intermedius/high first obtuse marginal branch with diffuse severe proximal disease and one additional significant obtuse marginal branch with luminal irregularities.  The right coronary artery is a moderate, dominant vessel that is proximally occluded with thrombus.  Following intervention the vessel was seen to have a mid to distal 30% lesion and then a distal 70% lesion just prior to the crux.  Down the crux, the vessel bifurcates into a moderate posterior descending coronary and a moderate posterolateral branch with luminal irregularities.    PERCUTANEOUS CORONARY INTERVENTION:  Lesion location: Proximal to mid right coronary artery  Lesion type: C  Lesion length: 40 mm  Pre-intervention DAHIANA flow: 0  Post-intervention DAHIANA flow: 3  Pre-intervention stenosis: 100%  Post-intervention stenosis: 0%  Stent: 3.0 x 48 mm Synergy drug-eluting stent    Lesion location: Distal right coronary artery into the  posterior descending coronary artery  Lesion type: A  Lesion length: 20 mm  Pre-intervention DAHIANA flow: 3  Post-intervention DAHIANA flow: 3  Pre-intervention stenosis: 70%  Post-intervention stenosis: 0%  Stent: 2.5 x 24 mm Resolute John drug-eluting stent    IMPRESSION:  Severe obstructive three-vessel coronary artery disease involving the left anterior descending, ramus intermedius, and right coronary arteries.  Acute thrombotic occlusion of the proximal right coronary artery successfully treated with one 3.0 x 48 mm Synergy drug-eluting stent.  Successful percutaneous coronary intervention to the distal right coronary artery into the posterior descending coronary artery with one 2.5 x 24 mm Synergy drug-eluting stent.  Preserved left ventricular systolic function.  Normal left heart filling pressures.    RECOMMENDATIONS:  Observation in IMCU.  TR band release per protocol.  Dual antiplatelet therapy with aspirin and prasugrel for least 6-12 months if tolerated.  Optimal medical management of acute myocardial infarction and residual coronary artery disease.  Echocardiogram.  Referral to cardiac rehabilitation on discharge.  Evaluation as outpatient for staged percutaneous coronary intervention to the left anterior descending coronary.

## 2022-10-03 NOTE — PROGRESS NOTES
Brief cardiology update:    Notified by RN of patient concerns of chest pain. PRN nitro was given with marked improvement. Repeat EKG demonstrated no significant changes. Patient was examined at bedside with no further chest pain. Will keep NPO at midnight and reassess in the morning to determine if staged PCI needs to be performed while still inpatient.

## 2022-10-03 NOTE — DISCHARGE PLANNING
Medical Social Work    Referral: STEMI    Intervention: Pt is a 52 year old male brought in by [facility] from work at [employer] for STEMI.  Pt is Jace Mohr (: 1970).  Pt states that he already called his wife, Radha ([phone]) who is a traveler nurse in [place] and she's on her way; however, he requested she be updated.  Pt also requested that his daughter, Shawn ([phone]) be updated.  MSW contacted pt's wife who verified she is on her way (about 4 hours out) and she was updated that per Cardiology pt is going to cath lab here shortly.  Pt's wife states that she will continue to try to get a hold of their daughter, Shawn but their daughter, Mindy is already aware.  Pt updated that his wife is on her way.      Plan: SW will follow as needed.

## 2022-10-03 NOTE — PROGRESS NOTES
4 Eyes Skin Assessment Completed by VENKATA Arredondo and VENKATA Jones.    Head WDL  Ears WDL  Nose WDL  Mouth WDL  Neck WDL  Breast/Chest WDL  Shoulder Blades WDL  Spine WDL  (R) Arm/Elbow/Hand Cath site @ wrist: clean/dry/intact  (L) Arm/Elbow/Hand WDL  Abdomen WDL  Groin WDL  Scrotum/Coccyx/Buttocks WDL  (R) Leg WDL  (L) Leg WDL  (R) Heel/Foot/Toe WDL  (L) Heel/Foot/Toe WDL          Devices In Places Tele Box and Pulse Ox      Interventions In Place Pillows    Possible Skin Injury No    Pictures Uploaded Into Epic N/A  Wound Consult Placed N/A  RN Wound Prevention Protocol Ordered No

## 2022-10-03 NOTE — CONSULTS
Cardiology Initial Consultation    Date of Service  10/3/2022    Referring Physician  Sandeep Stanton M.D.    Reason for Consultation  STEMI, acute inferior myocardial infarction.    History of Presenting Illness  Jace Mohr is a 52 y.o. male with a past medical history of chronic tobacco abuse, no family history of coronary artery disease who presented 10/3/2022 with chest pain and EKG consistent with inferior myocardial infarction. He was well until 02:30 when he began to experience chest pain described to be 10/10 chest pressure of his left chest. He called [facility] at 04:00 and his EKG was performed as above. He was given aspirin and nitroglycerine and transported to the emergency room. He is still experiencing 5/10 chest pain.      Review of Systems  Review of Systems   Unable to perform ROS: Acuity of condition   Cardiovascular:  Positive for chest pain.     Past Medical History   has no past medical history on file.Past Medical History: None.    Surgical History   has no past surgical history on file.Past Surgical History: None.    Family History  family history is not on file.    Social History   reports that he has been smoking cigarettes. He does not have any smokeless tobacco history on file.    Medications  None       Allergies  Not on File    Vital signs in last 24 hours       Physical Exam  Physical Exam  Constitutional:       General: He is in acute distress.      Appearance: Normal appearance. He is well-developed and normal weight. He is diaphoretic.   HENT:      Head: Normocephalic and atraumatic.      Mouth/Throat:      Mouth: Mucous membranes are moist.   Eyes:      Extraocular Movements: Extraocular movements intact.      Conjunctiva/sclera: Conjunctivae normal.   Cardiovascular:      Rate and Rhythm: Normal rate and regular rhythm.      Pulses: Normal pulses.      Heart sounds: Normal heart sounds.   Pulmonary:      Effort: Pulmonary effort is normal.      Breath sounds: Normal breath sounds.    Abdominal:      General: Bowel sounds are normal.      Palpations: Abdomen is soft.   Musculoskeletal:         General: Normal range of motion.      Cervical back: Normal range of motion and neck supple.   Skin:     General: Skin is warm.   Neurological:      General: No focal deficit present.      Mental Status: He is alert and oriented to person, place, and time. Mental status is at baseline.   Psychiatric:         Mood and Affect: Mood is anxious.         Behavior: Behavior normal.         Thought Content: Thought content normal.         Judgment: Judgment normal.       Lab Review  No results found for: WBC, RBC, HEMOGLOBIN, HEMATOCRIT, MCV, MCH, MCHC, MPV   No results found for: SODIUM, POTASSIUM, CHLORIDE, CO2, GLUCOSE, BUN, CREATININE, BUNCREATRAT, GLOMRATE   No results found for: ASTSGOT, ALTSGPT  No results found for: CHOLSTRLTOT, LDL, HDL, TRIGLYCERIDE, TROPONINT    No results for input(s): NTPROBNP in the last 72 hours.    Cardiac Imaging and Procedures Review  CARDIAC STUDIES/PROCEDURES:    EKG performed on (10/03/22) was reviewed: EKG personally interpreted shows sinus rhythm with inferior ST segment abnormalities.    Assessment/Plan  STEMI, acute inferior myocardial infarction: He is a 52 y.o. male with a past medical history of chronic tobacco abuse, no family history of coronary artery disease who was brought to emergency room with inferior myocardial infarction.  He is still experiencing chest pain as described. He will be taken to cardiac catheterization laboratory for urgent percutaneous intervention. The case was discussed with Juan Michael. He understands the risks and benefits and agrees with plan.     Thank you for allowing me to participate in the care of this patient.    Please contact me with any questions.    Sandeep Stanton M.D.   Cardiologist, [facility]  [phone]

## 2022-10-03 NOTE — PROGRESS NOTES
TOA 0430, Code STEMI paged out.    Prior to arrival 100mcg fentanyl, 0.4mg nitroglycerin & 324mg aspirin administered.     Pt to cath lab.

## 2022-10-03 NOTE — PROGRESS NOTES
Patient complaining of 2/10 chest pain. Dr. Palmer and cardiology notified. Orders for nitro, and STAT EKG ordered.

## 2022-10-04 LAB
ALBUMIN SERPL BCP-MCNC: 3.8 G/DL (ref 3.2–4.9)
ALBUMIN/GLOB SERPL: 2.4 G/DL
ALP SERPL-CCNC: 76 U/L (ref 30–99)
ALT SERPL-CCNC: 33 U/L (ref 2–50)
ANION GAP SERPL CALC-SCNC: 20 MMOL/L (ref 7–16)
AST SERPL-CCNC: 106 U/L (ref 12–45)
BASOPHILS # BLD AUTO: 0.5 % (ref 0–1.8)
BASOPHILS # BLD: 0.02 K/UL (ref 0–0.12)
BILIRUB SERPL-MCNC: 0.5 MG/DL (ref 0.1–1.5)
BUN SERPL-MCNC: 18 MG/DL (ref 8–22)
CALCIUM SERPL-MCNC: 8.6 MG/DL (ref 8.5–10.5)
CHLORIDE SERPL-SCNC: 101 MMOL/L (ref 96–112)
CHOLEST SERPL-MCNC: 176 MG/DL (ref 100–199)
CO2 SERPL-SCNC: 13 MMOL/L (ref 20–33)
CREAT SERPL-MCNC: 0.73 MG/DL (ref 0.5–1.4)
EOSINOPHIL # BLD AUTO: 0.07 K/UL (ref 0–0.51)
EOSINOPHIL NFR BLD: 1.8 % (ref 0–6.9)
ERYTHROCYTE [DISTWIDTH] IN BLOOD BY AUTOMATED COUNT: 46.5 FL (ref 35.9–50)
GFR SERPLBLD CREATININE-BSD FMLA CKD-EPI: 108 ML/MIN/1.73 M 2
GLOBULIN SER CALC-MCNC: 1.6 G/DL (ref 1.9–3.5)
GLUCOSE BLD STRIP.AUTO-MCNC: 226 MG/DL (ref 65–99)
GLUCOSE BLD STRIP.AUTO-MCNC: 256 MG/DL (ref 65–99)
GLUCOSE BLD STRIP.AUTO-MCNC: 319 MG/DL (ref 65–99)
GLUCOSE SERPL-MCNC: 220 MG/DL (ref 65–99)
HCT VFR BLD AUTO: 38 % (ref 42–52)
HDLC SERPL-MCNC: ABNORMAL MG/DL
HGB BLD-MCNC: 12.9 G/DL (ref 14–18)
IMM GRANULOCYTES # BLD AUTO: 0.01 K/UL (ref 0–0.11)
IMM GRANULOCYTES NFR BLD AUTO: 0.3 % (ref 0–0.9)
LDLC SERPL CALC-MCNC: ABNORMAL MG/DL
LIPASE SERPL-CCNC: 65 U/L (ref 11–82)
LYMPHOCYTES # BLD AUTO: 1.97 K/UL (ref 1–4.8)
LYMPHOCYTES NFR BLD: 49.3 % (ref 22–41)
MAGNESIUM SERPL-MCNC: 1.8 MG/DL (ref 1.5–2.5)
MCH RBC QN AUTO: 30.1 PG (ref 27–33)
MCHC RBC AUTO-ENTMCNC: 34.5 G/DL (ref 33.7–35.3)
MCV RBC AUTO: 86.6 FL (ref 81.4–97.8)
MONOCYTES # BLD AUTO: 0.51 K/UL (ref 0–0.85)
MONOCYTES NFR BLD AUTO: 12.8 % (ref 0–13.4)
NEUTROPHILS # BLD AUTO: 1.42 K/UL (ref 1.82–7.42)
NEUTROPHILS NFR BLD: 35.3 % (ref 44–72)
NRBC # BLD AUTO: 0.02 K/UL
NRBC BLD-RTO: 0.5 /100 WBC
PHOSPHATE SERPL-MCNC: 3.6 MG/DL (ref 2.5–4.5)
PLATELET # BLD AUTO: 146 K/UL (ref 164–446)
PMV BLD AUTO: 11 FL (ref 9–12.9)
POTASSIUM SERPL-SCNC: 4.3 MMOL/L (ref 3.6–5.5)
PROT SERPL-MCNC: 5.4 G/DL (ref 6–8.2)
RBC # BLD AUTO: 4.39 M/UL (ref 4.7–6.1)
SODIUM SERPL-SCNC: 134 MMOL/L (ref 135–145)
T4 FREE SERPL-MCNC: 1.02 NG/DL (ref 0.93–1.7)
TRIGL SERPL-MCNC: 1624 MG/DL (ref 0–149)
TSH SERPL DL<=0.005 MIU/L-ACNC: 1.5 UIU/ML (ref 0.38–5.33)
WBC # BLD AUTO: 4 K/UL (ref 4.8–10.8)

## 2022-10-04 PROCEDURE — 84439 ASSAY OF FREE THYROXINE: CPT

## 2022-10-04 PROCEDURE — A9270 NON-COVERED ITEM OR SERVICE: HCPCS | Performed by: PHYSICIAN ASSISTANT

## 2022-10-04 PROCEDURE — 36415 COLL VENOUS BLD VENIPUNCTURE: CPT

## 2022-10-04 PROCEDURE — 700102 HCHG RX REV CODE 250 W/ 637 OVERRIDE(OP): Performed by: PHYSICIAN ASSISTANT

## 2022-10-04 PROCEDURE — 84443 ASSAY THYROID STIM HORMONE: CPT

## 2022-10-04 PROCEDURE — 700102 HCHG RX REV CODE 250 W/ 637 OVERRIDE(OP): Performed by: INTERNAL MEDICINE

## 2022-10-04 PROCEDURE — 80061 LIPID PANEL: CPT

## 2022-10-04 PROCEDURE — 99232 SBSQ HOSP IP/OBS MODERATE 35: CPT | Mod: GC | Performed by: INTERNAL MEDICINE

## 2022-10-04 PROCEDURE — 84100 ASSAY OF PHOSPHORUS: CPT

## 2022-10-04 PROCEDURE — 84681 ASSAY OF C-PEPTIDE: CPT

## 2022-10-04 PROCEDURE — 86337 INSULIN ANTIBODIES: CPT

## 2022-10-04 PROCEDURE — A9270 NON-COVERED ITEM OR SERVICE: HCPCS | Performed by: INTERNAL MEDICINE

## 2022-10-04 PROCEDURE — 97162 PT EVAL MOD COMPLEX 30 MIN: CPT

## 2022-10-04 PROCEDURE — 700111 HCHG RX REV CODE 636 W/ 250 OVERRIDE (IP)

## 2022-10-04 PROCEDURE — 99233 SBSQ HOSP IP/OBS HIGH 50: CPT | Performed by: INTERNAL MEDICINE

## 2022-10-04 PROCEDURE — 770020 HCHG ROOM/CARE - TELE (206)

## 2022-10-04 PROCEDURE — 83690 ASSAY OF LIPASE: CPT

## 2022-10-04 PROCEDURE — 85025 COMPLETE CBC W/AUTO DIFF WBC: CPT

## 2022-10-04 PROCEDURE — 82962 GLUCOSE BLOOD TEST: CPT | Mod: 91

## 2022-10-04 PROCEDURE — 83735 ASSAY OF MAGNESIUM: CPT

## 2022-10-04 PROCEDURE — 700111 HCHG RX REV CODE 636 W/ 250 OVERRIDE (IP): Performed by: INTERNAL MEDICINE

## 2022-10-04 PROCEDURE — 80053 COMPREHEN METABOLIC PANEL: CPT

## 2022-10-04 RX ORDER — INSULIN LISPRO 100 [IU]/ML
3-14 INJECTION, SOLUTION INTRAVENOUS; SUBCUTANEOUS
Status: DISCONTINUED | OUTPATIENT
Start: 2022-10-04 | End: 2022-10-04

## 2022-10-04 RX ORDER — MAGNESIUM SULFATE HEPTAHYDRATE 40 MG/ML
2 INJECTION, SOLUTION INTRAVENOUS ONCE
Status: COMPLETED | OUTPATIENT
Start: 2022-10-04 | End: 2022-10-04

## 2022-10-04 RX ORDER — CHLORAL HYDRATE 500 MG
1000 CAPSULE ORAL
Status: DISCONTINUED | OUTPATIENT
Start: 2022-10-04 | End: 2022-10-05

## 2022-10-04 RX ORDER — FENOFIBRATE 67 MG/1
67 CAPSULE ORAL DAILY
Status: DISCONTINUED | OUTPATIENT
Start: 2022-10-04 | End: 2022-10-05

## 2022-10-04 RX ORDER — INSULIN LISPRO 100 [IU]/ML
5 INJECTION, SOLUTION INTRAVENOUS; SUBCUTANEOUS
Status: DISCONTINUED | OUTPATIENT
Start: 2022-10-04 | End: 2022-10-04

## 2022-10-04 RX ORDER — INSULIN LISPRO 100 [IU]/ML
6 INJECTION, SOLUTION INTRAVENOUS; SUBCUTANEOUS
Status: DISCONTINUED | OUTPATIENT
Start: 2022-10-04 | End: 2022-10-05

## 2022-10-04 RX ORDER — INSULIN LISPRO 100 [IU]/ML
3-14 INJECTION, SOLUTION INTRAVENOUS; SUBCUTANEOUS
Status: DISCONTINUED | OUTPATIENT
Start: 2022-10-04 | End: 2022-10-05 | Stop reason: HOSPADM

## 2022-10-04 RX ORDER — ATORVASTATIN CALCIUM 80 MG/1
80 TABLET, FILM COATED ORAL EVERY EVENING
Status: DISCONTINUED | OUTPATIENT
Start: 2022-10-04 | End: 2022-10-05

## 2022-10-04 RX ADMIN — FENOFIBRATE 67 MG: 67 CAPSULE ORAL at 08:00

## 2022-10-04 RX ADMIN — INSULIN LISPRO 5 UNITS: 100 INJECTION, SOLUTION INTRAVENOUS; SUBCUTANEOUS at 12:10

## 2022-10-04 RX ADMIN — MAGNESIUM SULFATE HEPTAHYDRATE 2 G: 40 INJECTION, SOLUTION INTRAVENOUS at 09:30

## 2022-10-04 RX ADMIN — INSULIN LISPRO 6 UNITS: 100 INJECTION, SOLUTION INTRAVENOUS; SUBCUTANEOUS at 17:47

## 2022-10-04 RX ADMIN — CARVEDILOL 6.25 MG: 6.25 TABLET, FILM COATED ORAL at 07:57

## 2022-10-04 RX ADMIN — METFORMIN HYDROCHLORIDE 500 MG: 500 TABLET ORAL at 17:48

## 2022-10-04 RX ADMIN — INSULIN LISPRO 7 UNITS: 100 INJECTION, SOLUTION INTRAVENOUS; SUBCUTANEOUS at 17:47

## 2022-10-04 RX ADMIN — ASPIRIN 81 MG: 81 TABLET, COATED ORAL at 05:06

## 2022-10-04 RX ADMIN — OMEGA-3 FATTY ACIDS CAP 1000 MG 1000 MG: 1000 CAP at 12:10

## 2022-10-04 RX ADMIN — CARVEDILOL 6.25 MG: 6.25 TABLET, FILM COATED ORAL at 17:47

## 2022-10-04 RX ADMIN — ENOXAPARIN SODIUM 40 MG: 40 INJECTION SUBCUTANEOUS at 17:48

## 2022-10-04 RX ADMIN — ATORVASTATIN CALCIUM 80 MG: 80 TABLET, FILM COATED ORAL at 17:47

## 2022-10-04 RX ADMIN — SENNOSIDES AND DOCUSATE SODIUM 2 TABLET: 50; 8.6 TABLET ORAL at 05:06

## 2022-10-04 RX ADMIN — INSULIN LISPRO 10 UNITS: 100 INJECTION, SOLUTION INTRAVENOUS; SUBCUTANEOUS at 12:09

## 2022-10-04 RX ADMIN — NICOTINE TRANSDERMAL SYSTEM 21 MG: 21 PATCH, EXTENDED RELEASE TRANSDERMAL at 05:06

## 2022-10-04 RX ADMIN — OMEGA-3 FATTY ACIDS CAP 1000 MG 1000 MG: 1000 CAP at 17:48

## 2022-10-04 RX ADMIN — INSULIN HUMAN 4 UNITS: 100 INJECTION, SOLUTION PARENTERAL at 07:56

## 2022-10-04 RX ADMIN — LISINOPRIL 5 MG: 5 TABLET ORAL at 05:05

## 2022-10-04 RX ADMIN — PRASUGREL 10 MG: 10 TABLET, FILM COATED ORAL at 05:06

## 2022-10-04 RX ADMIN — OMEGA-3 FATTY ACIDS CAP 1000 MG 1000 MG: 1000 CAP at 08:00

## 2022-10-04 ASSESSMENT — ENCOUNTER SYMPTOMS
DIAPHORESIS: 0
AGITATION: 0
CONSTIPATION: 0
SPEECH DIFFICULTY: 0
EYE DISCHARGE: 0
SEIZURES: 0
APPETITE CHANGE: 0
HALLUCINATIONS: 0
FLANK PAIN: 0
ACTIVITY CHANGE: 0
ADENOPATHY: 0
EYE REDNESS: 0
PALPITATIONS: 0
POLYDIPSIA: 0
COUGH: 0
MYALGIAS: 0
TREMORS: 0
HEADACHES: 0
BACK PAIN: 0
NERVOUS/ANXIOUS: 0
WEAKNESS: 0
EYE ITCHING: 0
NUMBNESS: 0
NAUSEA: 0
BRUISES/BLEEDS EASILY: 0
CHILLS: 0
CONFUSION: 0
POLYPHAGIA: 0
COLOR CHANGE: 0
EYE PAIN: 0
ABDOMINAL PAIN: 0
LIGHT-HEADEDNESS: 0
DIARRHEA: 0
JOINT SWELLING: 0
CHEST TIGHTNESS: 0
DECREASED CONCENTRATION: 0
CHOKING: 0
STRIDOR: 0
ABDOMINAL DISTENTION: 0

## 2022-10-04 ASSESSMENT — COGNITIVE AND FUNCTIONAL STATUS - GENERAL
SUGGESTED CMS G CODE MODIFIER MOBILITY: CH
MOBILITY SCORE: 24

## 2022-10-04 ASSESSMENT — PAIN DESCRIPTION - PAIN TYPE
TYPE: ACUTE PAIN

## 2022-10-04 ASSESSMENT — FIBROSIS 4 INDEX: FIB4 SCORE: 6.82

## 2022-10-04 NOTE — CARE PLAN
Problem: Knowledge Deficit - Standard  Goal: Patient and family/care givers will demonstrate understanding of plan of care, disease process/condition, diagnostic tests and medications  Outcome: Progressing     Problem: Pain - Standard  Goal: Alleviation of pain or a reduction in pain to the patient’s comfort goal  Outcome: Progressing   The patient is Stable - Low risk of patient condition declining or worsening    Shift Goals  Clinical Goals: pain free, hemodynamic stability  Patient Goals: no pain  Family Goals: LATASHA

## 2022-10-04 NOTE — PROGRESS NOTES
[facility] Daily Progress Note    Date of Service  10/4/2022    [facility] Team: [facility] IM White Team   Attending: Jean Paul Andrews M.d.  Senior Resident: Dr. Cheema  Intern:  Dr. Posada  Contact Number: [phone]    Chief Complaint  Jace Mohr is a 52 y.o. male admitted 10/3/2022 with Chest pain. STEMI    Hospital Course  No notes on file    Interval Problem Update  -No acute events over night  -Patient noted some brief CP yesterday afternoon. Resolved with nitro, and has yet to return.   -    I have discussed this patient's plan of care and discharge plan at IDT rounds today with Case Management, Nursing, Nursing leadership, and other members of the IDT team.    Consultants/Specialty  cardiology    Code Status  Full Code    Disposition  Patient is medically cleared for discharge.   Anticipate discharge to to home with close outpatient follow-up.  I have placed the appropriate orders for post-discharge needs.    Review of Systems  ROS     Physical Exam  Temp:  [36.1 °C (97 °F)-37 °C (98.6 °F)] 37 °C (98.6 °F)  Pulse:  [57-69] 67  Resp:  [16-18] 16  BP: (104-120)/(58-74) 120/74  SpO2:  [90 %-97 %] 97 %    Physical Exam    Fluids    Intake/Output Summary (Last 24 hours) at 10/4/2022 1540  Last data filed at 10/4/2022 1534  Gross per 24 hour   Intake 660 ml   Output --   Net 660 ml       Laboratory  Recent Labs     10/03/22  0435 10/04/22  0018   WBC 3.3* 4.0*   RBC 5.31 4.39*   HEMOGLOBIN 16.5 12.9*   HEMATOCRIT 44.7 38.0*   MCV 84.2 86.6   MCH 31.1 30.1   MCHC 36.9* 34.5   RDW 42.5 46.5   PLATELETCT 186 146*   MPV 10.7 11.0     Recent Labs     10/03/22  0435 10/03/22  1600 10/04/22  0018   SODIUM 132* 135 134*   POTASSIUM 4.0 4.1 4.3   CHLORIDE 98 104 101   CO2 16* 15* 13*   GLUCOSE 391* 262* 220*   BUN 16 17 18   CREATININE 0.85 0.70 0.73   CALCIUM 9.1 9.0 8.6     Recent Labs     10/03/22  0435   APTT 30.0   INR 0.96         Recent Labs     10/04/22  0018   TRIGLYCERIDE 1624*   HDL see below   LDL see below        Imaging  EC-ECHOCARDIOGRAM COMPLETE W/O CONT   Final Result      DX-CHEST-PORTABLE (1 VIEW)   Final Result         1.  No acute cardiopulmonary disease.      CL-LEFT HEART CATHETERIZATION WITH POSSIBLE INTERVENTION    (Results Pending)        Assessment/Plan  Problem Representation:    * STEMI (ST elevation myocardial infarction) (HCC)- (present on admission)  Assessment & Plan  Presented with pressure-like chest pain and ST elevation in inferior leads.  PCI 10/03/22 = Severe obstructive three-vessel coronary artery disease involving the left anterior descending, ramus intermedius, and right coronary arteries. Acute thrombotic occlusion of the proximal right coronary artery successfully treated with one 3.0 x 48 mm Synergy drug-eluting stent. Successful percutaneous coronary intervention to the distal right coronary artery into the posterior descending coronary artery with one 2.5 x 24 mm Synergy drug-eluting stent. Preserved left ventricular systolic function, pressures.    -GDMT, DAPT, ACE inhibitor, beta-blocker, atorvastatin  -Transthoracic echo, EF=60%  -Cardiac rehab and lifestyle modification, smoking cessation  -Cardiology to f/u outpatient and evaluate need for LAD stent. Residual LAD disease for likely staged intervention, as per cardiology. Patient complained of another brief episode of CP post PCI. Resolved as of this point.       Hyperglycemia- (present on admission)  Assessment & Plan  Random blood sugar was 391, bicarb 16, anion gap 18, BHB= 0.45. Consistent with a possible LORENZO presentation. Hemoglobin A1c= 8    --Labs pending for insulin antibodies, C-peptide  --Fenofibrate 67mg, lantus 20mg, lispro 5 TIC AC.   --Needs diabetic education  --May trial oral medication prior to discharge and outpatient follow-up  --Cardiology mentions adding SGLT-inh which I cannot order per pharmacy.     Nicotine dependence- (present on admission)  Assessment & Plan  Smoking cessation counseling, nicotine  patch       VTE prophylaxis: SCDs/TEDs and enoxaparin ppx    I have performed a physical exam and reviewed and updated ROS and Plan today (10/4/2022). In review of yesterday's note (10/3/2022), there are no changes except as documented above.

## 2022-10-04 NOTE — NON-PROVIDER
"This note is intended for the purposes of medical student education and feedback only.   Please refer to the documentation by this patient's assigned medical practitioner for details of care and plans.    Medical Student Progress Note  Note Author: Ac Fong  Date: 10/4/2022    Date of Admission: 10/3/2022  Primary Team: [facility] IM White Team  Attending: Dr. Andrews  Senior Resident: Dr. Cheema  Intern: Dr. Posada  Medical Student: Ac Fong MS3    CC: Chest pain    ID: Jace Mohr is a 52 y.o. male with a significant smoking history and PMH of undiagnosed DM presented to the ED with chest pain, was found to have STEMI. He is POD#1 s/p PCI.     SUBJECTIVE  Interval Update for 10/4/2022  Patient reports being able to ambulate without any chest pain. Had bowel movement this AM. He is still NPO, has had no diet today. He described chest pain the previous evening to the right chest and with different nature than the pain he experienced with his initial STEMI presentation. This pain lasted for a short time and has completely resolved this AM.    Interval updates:  -Lipase is wnl  -TSH/T4 wnl  -POC glucose is 319    Review of Systems  Positive for none  Negative for chest pain.       OBJECTIVE   Physical Exam:  /65   Pulse 64   Temp 36.8 °C (98.2 °F) (Temporal)   Resp 18   Ht 1.854 m (6' 1\")   Wt 100 kg (220 lb 10.9 oz)   SpO2 96%     Intake/Output Summary (Last 24 hours) at 10/4/2022 1419  Last data filed at 10/4/2022 1143  Gross per 24 hour   Intake 480 ml   Output --   Net 480 ml       General: Well developed, well nourished,  male, appears stated age, appears to be in no acute distress.  HEENT: Normocephalic, atraumatic. EOM grossly intact, PERRLA. Mucous membranes moist. No lymphadenopathy.  Cardio: Normal S1 and S2. Regular rate and rhythm. No murmurs, rubs, or gallops.  Pulmonary: Lungs are clear to auscultation bilaterally. No wheezes, rales, or rhonchi.  Abdomen: Normoactive bowel sounds. " Abdomen is soft and nondistended. No masses. No tenderness to palpation in all four quadrants.   MSK: Normal ROM. Extremities well-perfused. Capillary refill <2 seconds. No LE edema.  Neuro: A&Ox4. CN II-XII grossly intact. Strength and sensation intact throughout. Reflexes 2+ throughout.  Skin: No rash, lesions, or skin ulcers. No jaundice, ecchymoses, or petechiae.   Psych: Good mood congruent affect. Thought form linear, content appropriate.    Lab Results:  Recent Labs     10/03/22  0435 10/04/22  0018   WBC 3.3* 4.0*   RBC 5.31 4.39*   HEMOGLOBIN 16.5 12.9*   HEMATOCRIT 44.7 38.0*   MCV 84.2 86.6   MCH 31.1 30.1   RDW 42.5 46.5   PLATELETCT 186 146*   MPV 10.7 11.0   NEUTSPOLYS 32.60* 35.30*   LYMPHOCYTES 47.70* 49.30*   MONOCYTES 17.30* 12.80   EOSINOPHILS 1.20 1.80   BASOPHILS 0.90 0.50     Recent Labs     10/03/22  0435 10/03/22  0645 10/03/22  1600 10/04/22  0018   SODIUM 132*  --  135 134*   POTASSIUM 4.0  --  4.1 4.3   CHLORIDE 98  --  104 101   CO2 16*  --  15* 13*   BUN 16  --  17 18   CREATININE 0.85  --  0.70 0.73   CALCIUM 9.1  --  9.0 8.6   MAGNESIUM  --  1.8  --  1.8   PHOSPHORUS  --  2.1*  --  3.6   ALBUMIN 4.3  --   --  3.8     Estimated GFR/CRCL = Estimated Creatinine Clearance: 143.8 mL/min (by C-G formula based on SCr of 0.73 mg/dL).  Recent Labs     10/03/22  0435 10/03/22  1600 10/04/22  0018   GLUCOSE 391* 262* 220*     Recent Labs     10/03/22  0435 10/04/22  0018   ASTSGOT 29 106*   ALTSGPT 15 33   TBILIRUBIN 0.7 0.5   ALKPHOSPHAT 99 76   GLOBULIN 2.6 1.6*   INR 0.96  --              Recent Labs     10/03/22  0435   INR 0.96   APTT 30.0       Microbiology Results:  Results       Procedure Component Value Units Date/Time    URINALYSIS [[id number]]  (Abnormal) Collected: 10/03/22 0810    Order Status: Completed Specimen: Urine, Clean Catch Updated: 10/03/22 0836     Color Yellow     Character Clear     Specific Gravity >=1.045     Ph 6.0     Glucose >=1000 mg/dL      Ketones 40 mg/dL       Protein Negative mg/dL      Bilirubin Negative     Urobilinogen, Urine 1.0     Nitrite Negative     Leukocyte Esterase Negative     Occult Blood Small     Micro Urine Req Microscopic    Narrative:      Collected By: [id number] DOMINICK MEDEROS            Imaging Results:  EC-ECHOCARDIOGRAM COMPLETE W/O CONT   Final Result      DX-CHEST-PORTABLE (1 VIEW)   Final Result         1.  No acute cardiopulmonary disease.      CL-LEFT HEART CATHETERIZATION WITH POSSIBLE INTERVENTION    (Results Pending)       EKG  Results for orders placed or performed during the hospital encounter of 10/03/22   EKG   Result Value Ref Range    Report       [facility] Emergency Dept.    Test Date:  2022-10-03  Pt Name:    FLY SCOTT                  Department: ER  MRN:        [id number]                      Room:       [facility]  Gender:     Male                         Technician: [id number]  :        1970                   Requested By:ER TRIAGE PROTOCOL  Order #:    [id number]                    Reading MD: DOMINGO MARIE, DO    Measurements  Intervals                                Axis  Rate:       96                           P:          73  FL:         146                          QRS:        80  QRSD:       108                          T:          86  QT:         382  QTc:        483    Interpretive Statements  Sinus rhythm  Probable left atrial enlargement  Inferior infarct, acute (RCA)  Probable RV involvement, suggest recording right precordial leads  No previous ECG available for comparison  Electronically Signed On 10-3-2022 4:46:16 PDT by DOMINGO MARIE, DO     EKG in four (4) hours   Result Value Ref Range    Report       [facility]    Test Date:  2022-10-03  Pt Name:    FLY SCOTT                  Department: ER  MRN:        [id number]                      Room:       T710  Gender:     Male                         Technician: DLH  :        1970                   Requested By:RAY   STEPHANIE  Order #:    [id number]                    Reading MD: Bethel Odom MD    Measurements  Intervals                                Axis  Rate:       79                           P:          42  NE:         149                          QRS:        -2  QRSD:       90                           T:          9  QT:         422  QTc:        484    Interpretive Statements  Sinus rhythm  Inferior infarct, recent  Compared to ECG 10/03/2022 06:19:02  No significant changes  Electronically Signed On 10-3-2022 16:33:36 PDT by Bethel Odom MD     EKG STAT   Result Value Ref Range    Report       [facility]    Test Date:  2022-10-03  Pt Name:    FLY SCOTT                  Department: 161  MRN:        [id number]                      Room:       10  Gender:     Male                         Technician: TATIANA  :        1970                   Requested By:ANDI BLANC  Order #:    [id number]                    Reading MD: Bethel Odom MD    Measurements  Intervals                                Axis  Rate:       77                           P:          64  NE:         156                          QRS:        41  QRSD:       97                           T:          -2  QT:         435  QTc:        493    Interpretive Statements  Sinus rhythm  Inferior infarct, age indeterminate  Compared to ECG 10/03/2022 04:29:53  No significant changes  Electronically Signed On 10-3-2022 16:24:01 PDT by Bethel Odom MD         Current Medications    Current Facility-Administered Medications:     atorvastatin (LIPITOR) tablet 80 mg, 80 mg, Oral, Q EVENING, DANII Ray-C.    fenofibrate micronized (LOFIBRA) capsule 67 mg, 67 mg, Oral, DAILY, SIMEON Ray.-C., 67 mg at 10/04/22 0800    fish oil capsule 1,000 mg, 1,000 mg, Oral, TID WITH MEALS, DANII Ray-C., 1,000 mg at 10/04/22 1210    insulin GLARGINE (Lantus,Semglee) injection, 0.2 Units/kg/day, Subcutaneous, Q EVENING, Nicola Posada,  D.O.    insulin lispro (AdmeLOG,HumaLOG) injection, 5 Units, Subcutaneous, TID Nicola AVERY D.O., 5 Units at 10/04/22 1210    insulin lispro (AdmeLOG,HumaLOG) injection, 3-14 Units, Subcutaneous, 4X/DAY ACHS, Jean Paul Andrews M.D., 10 Units at 10/04/22 1209    senna-docusate (PERICOLACE or SENOKOT S) 8.6-50 MG per tablet 2 Tablet, 2 Tablet, Oral, BID, 2 Tablet at 10/04/22 0506 **AND** polyethylene glycol/lytes (MIRALAX) PACKET 1 Packet, 1 Packet, Oral, QDAY PRN **AND** magnesium hydroxide (MILK OF MAGNESIA) suspension 30 mL, 30 mL, Oral, QDAY PRN **AND** bisacodyl (DULCOLAX) suppository 10 mg, 10 mg, Rectal, QDAY PRN, Marlon Mixon M.D.    enoxaparin (Lovenox) inj 40 mg, 40 mg, Subcutaneous, DAILY AT 1800, Marlon Mixon M.D., 40 mg at 10/03/22 1752    acetaminophen (Tylenol) tablet 650 mg, 650 mg, Oral, Q6HRS PRN, Marlon Mixon M.D.    carvedilol (COREG) tablet 6.25 mg, 6.25 mg, Oral, BID WITH MEALS, Marlon Mixon M.D., 6.25 mg at 10/04/22 0757    lisinopril (PRINIVIL) tablet 5 mg, 5 mg, Oral, Q DAY, Marlon Mixon M.D., 5 mg at 10/04/22 0505    ondansetron (ZOFRAN) syringe/vial injection 4 mg, 4 mg, Intravenous, Q4HRS PRN, Marlon Mixon M.D.    ondansetron (ZOFRAN ODT) dispertab 4 mg, 4 mg, Oral, Q4HRS PRN, Marlon Mixon M.D.    promethazine (PHENERGAN) tablet 12.5-25 mg, 12.5-25 mg, Oral, Q4HRS PRN, Marlon Mixon M.D.    promethazine (PHENERGAN) suppository 12.5-25 mg, 12.5-25 mg, Rectal, Q4HRS PRN, Marlon Mixon M.D.    prochlorperazine (COMPAZINE) injection 5-10 mg, 5-10 mg, Intravenous, Q4HRS PRN, Marlon Mixon M.D.    nicotine (NICODERM) 21 MG/24HR 21 mg, 21 mg, Transdermal, Daily-0600, 21 mg at 10/04/22 0506 **AND** Nicotine Replacement Patient Education Materials, , , Once **AND** nicotine polacrilex (NICORETTE) 2 MG piece 2 mg, 2 mg, Oral, Q HOUR PRN, Marlon Mixon M.D.    aspirin EC (ECOTRIN) tablet 81 mg, 81 mg, Oral, DAILY, Juan Rodriguez M.D., 81 mg  at 10/04/22 0506    prasugrel (EFFIENT) tablet 10 mg, 10 mg, Oral, DAILY, Juan Rodriguez M.D., 10 mg at 10/04/22 0506    [DISCONTINUED] insulin regular (HumuLIN R,NovoLIN R) injection, 3-14 Units, Subcutaneous, 4X/DAY ACHS, 4 Units at 10/04/22 0756 **AND** POC blood glucose manual result, , , Q AC AND BEDTIME(S) **AND** NOTIFY MD and PharmD, , , Once **AND** Administer 20 grams of glucose (approximately 8 ounces of fruit juice) every 15 minutes PRN FSBG less than 70 mg/dL, , , PRN **AND** dextrose 50% (D50W) injection 25 g, 25 g, Intravenous, Q15 MIN PRN, Marlon Mixon M.D.    nitroglycerin (NITROSTAT) tablet 0.4 mg, 0.4 mg, Sublingual, Q5 MIN PRN, Sabino Palmer M.D., 0.4 mg at 10/03/22 1455    ASSESSMENT/PLAN  Jace Mohr is a 52 y.o. male with a significant smoking history and PMH of undiagnosed DM presented to the ED with chest pain, was found to have STEMI. He is POD#1 s/p PCI.      Diagnoses:  #Neuro  Patient's pain is well controlled.   -continue nitroglycerin and morphine for CP    #CVD  51 yo male patient with significant risk factors including smoker, uncontrolled DM presenting with acute chest pain concerning for acute coronary syndrome. Was treated with PCI and now improved symptoms and hemodynamically stable. Patient had recurrent chest pain, but to right chest and different characteristic compared to his first presentation with repeat EKG without signs of re-infarction.     Diagnostics  -TTE without signs of LV or RV dysfunction, or hemodynamic instability.     Plan:  -ASA 81mg PO daily  -Atorvastatin 80mg PO daily. LDL goal <70mg/dL. Monitor CPK.   -Prasugrel for 1 year.   -BB with carvedilol  -ACE inhibitor with lisinopril  -cardiac rehabilitation  -Risk modification with smoking cessation (discussing with patient; agreed), weightloss/diet, and exercise.   -DM control with SGLT2 as described below.     #Pulm  Patient with recent inferior STEMI s/p PCI. Hemodynamically stable. No signs of  cardiogenic pulmonary edema.    #GI  BMI of 29. Lipase is wnl. Triglycerides of 1612.  -diet and nutrition consultation and education.  -smoking cessation as above  -exercise recommendation as tolerated considering recent STEMI  -decrease or cease alcohol consumption  -consider fibrates if triglycerides persistently <1000 and not improving beyond that. Monitor for myopathy considering patient will be on statin therapy.     #Renal/    #Endocrine  A1c of 8. POC blood sugar levels of 319. This could be undiagnosed DM LORENZO vs Type 2 DM.  -C-peptide; will be low in type 1.   -DM type 1 autoantibodies including ANNITA-65, IA2, ZnT8.   -basal-bolus glycemic control inpatient.   -initiate metformin therapy. Add SGLT2 therapy for CVD benefits as well as benefits in improving weight-loss.   -measure microalbuminuria for diabetic nephropathy.  -monofilament test for diabetic neuropathy.  -follow up with ophtho as outpatient for diabetic retinopathy.      PROPHYLAXIS  DVT: enoxaparin  GI: regular diet

## 2022-10-04 NOTE — PROGRESS NOTES
Pt has been having persistent issues with hemolyzed labs. Labs have been drawn multiple times and we have not had a single collect that has not been hemolyzed.

## 2022-10-04 NOTE — DOCUMENTATION QUERY
"                                                                         [facility]                                                                       Query Response Note      PATIENT:               FLY SCOTT  ACCT #:                  [id number]  MRN:                     [id number]  :                      1970  ADMIT DATE:       10/3/2022 4:33 AM  DISCH DATE:          RESPONDING  PROVIDER #:        [id number]           QUERY TEXT:    \"May be consistent with mild DKA\" documented in the H&P and Progress Note.  Please clarify status of this condition.       The patient's Clinical Indicators include:  10/3   Glucose: 39 -> 262; Anion Gap: 18.0 -> 16.0; Co2: 16 -> 15; Lactic Acid: 2.9  POCT Glucose: 299 -> 276 -> 294 -> 291  Glycohemoglobin: 8.0  H&P: Blood sugar 391, bicarb 16, anion gap 18, may be consistent w/ mild DKA 2/2 undiagnosed diabetes.    10/4   Glucose: 220; POCT Glucose: 226 -> 319; Co2: 13; Anion Gap: 20.0  HM PN: Hemoglobin A1c is 8. Undiagnosed diabetes. Improved after management    Treatment: ISS and scheduled insulin; lab testing; tx underlying conditions  Risk Factors: Age; hx of obesity; CAD    Thank You,  Nicole Nugent RN  Clinical    Connect via [employer]  Options provided:   -- Mild DKA is ruled in   -- Mild DKA is ruled out   -- Other explanation, (please specify other explanation)   -- Unable to determine      Query created by: Nicole Nugent on 10/4/2022 2:00 PM    RESPONSE TEXT:    Mild DKA is ruled in          Electronically signed by:  JUSTICE SANTANA MD 10/4/2022 3:52 PM              "

## 2022-10-04 NOTE — PROGRESS NOTES
Report received, pt care assumed, tele box on. VSS, pt assessment complete. Pt aaox4, no signs of distress noted at this time. POC discussed with pt and verbalizes no questions.  Pt denies any additional needs at this time. Bed in lowest position, pt is up self, pt educated on fall risk and verbalized understanding, call light within reach.

## 2022-10-04 NOTE — THERAPY
"Physical Therapy   Initial Evaluation     Patient Name: Jace Mohr  Age:  52 y.o., Sex:  male  Medical Record #: [id number]  Today's Date: 10/4/2022     Precautions  Precautions: Fall Risk;Cardiac Precautions (See Comments)    Assessment   Jace Mohr is a 52 y.o. male with a significant smoking history and PMH of undiagnosed DM presented to the ED with chest pain, was found to have STEMI. He is POD#1 s/p PCI. PT cardiac rehab order received and chart reviewed. Full evaluation deferred at the patient's request, however a cardiac handout was provided and discussed regarding talk test/RPE scale to modify home walking program, modifiable cardiac risk factors and role/timeline of outpatient cardiac rehab. Patient reporting compliance and understanding. No further acute PT cardiac rehab needs identified, please reconsult should conditions change or place a 'PT Eval and Treat' order if a mobility evaluation is requested.     Plan    Recommend Physical Therapy for Evaluation only     DC Equipment Recommendations: None  Discharge Recommendations: Anticipate that the patient will have no further physical therapy needs after discharge from the hospital       Subjective    \"I'm betsy to be alive\"     Objective       10/04/22 1500   Precautions   Precautions Fall Risk;Cardiac Precautions (See Comments)   Pain 0 - 10 Group   Therapist Pain Assessment 0;Post Activity Pain Same as Prior to Activity   Prior Living Situation   Prior Services Home-Independent   Housing / Facility 1 Story House   Steps Into Home 0   Steps In Home 0   Equipment Owned None   Lives with - Patient's Self Care Capacity Spouse;Child Less than 18 Years of Age   Comments patient is a  and has assist as needed   Prior Level of Functional Mobility   Bed Mobility Independent   Transfer Status Independent   Ambulation Independent   Distance Ambulation (Feet)   (communtiy distances)   Assistive Devices Used None   Comments walks his dog daily and " ambulate approx 9000 steps/day   History of Falls   History of Falls No   Cognition    Cognition / Consciousness WDL   Comments pleasant and cooperative   Strength Lower Body   Lower Body Strength  WDL   How much difficulty does the patient currently have...   Turning over in bed (including adjusting bedclothes, sheets and blankets)? 4   Sitting down on and standing up from a chair with arms (e.g., wheelchair, bedside commode, etc.) 4   Moving from lying on back to sitting on the side of the bed? 4   How much help from another person does the patient currently need...   Moving to and from a bed to a chair (including a wheelchair)? 4   Need to walk in a hospital room? 4   Climbing 3-5 steps with a railing? 4   6 clicks Mobility Score 24   Education Group   Education Provided Cardiac Precautions;Role of Physical Therapist   Cardiac Precautions Patient Response Patient;Family;Acceptance;Explanation;Demonstration;Verbal Demonstration;Action Demonstration   Role of Physical Therapist Patient Response Patient;Family;Acceptance;Explanation;Demonstration;Verbal Demonstration   Anticipated Discharge Equipment and Recommendations   DC Equipment Recommendations None   Discharge Recommendations Anticipate that the patient will have no further physical therapy needs after discharge from the hospital     Iris Currie, PT, DPT, GCS

## 2022-10-04 NOTE — CARE PLAN
Problem: Knowledge Deficit - Standard  Goal: Patient and family/care givers will demonstrate understanding of plan of care, disease process/condition, diagnostic tests and medications  Outcome: Progressing     Problem: Pain - Standard  Goal: Alleviation of pain or a reduction in pain to the patient’s comfort goal  Outcome: Progressing   The patient is Stable - Low risk of patient condition declining or worsening         Progress made toward(s) clinical / shift goals:    Problem: Knowledge Deficit - Standard  Goal: Patient and family/care givers will demonstrate understanding of plan of care, disease process/condition, diagnostic tests and medications  Outcome: Progressing     Problem: Pain - Standard  Goal: Alleviation of pain or a reduction in pain to the patient’s comfort goal  Outcome: Progressing       Patient is not progressing towards the following goals:

## 2022-10-04 NOTE — PROGRESS NOTES
Cardiology Follow Up Progress Note    Date of Service  10/4/2022    Attending Physician  Jean Paul Andrews M.D.    Chief Complaint   STEMI    HPI  Jace Mohr is a 52 y.o. male admitted 10/3/2022 with STEMI    Interim Events  Mild resting chest pain last night  Walking around unit, no chest pain  BP tolerating meds  Would benefit from SGLT-2 as outpatient      Review of Systems  Review of Systems   Constitutional:  Negative for activity change, appetite change, chills and diaphoresis.   Eyes:  Negative for pain, discharge, redness and itching.   Respiratory:  Negative for cough, choking, chest tightness and stridor.    Cardiovascular:  Negative for palpitations.   Gastrointestinal:  Negative for abdominal distention, abdominal pain, constipation, diarrhea and nausea.   Endocrine: Negative for cold intolerance, heat intolerance, polydipsia and polyphagia.   Genitourinary:  Negative for difficulty urinating, dysuria, enuresis, flank pain, frequency, genital sores and hematuria.   Musculoskeletal:  Negative for back pain, gait problem, joint swelling and myalgias.   Skin:  Negative for color change, pallor and rash.   Neurological:  Negative for tremors, seizures, syncope, speech difficulty, weakness, light-headedness, numbness and headaches.   Hematological:  Negative for adenopathy. Does not bruise/bleed easily.   Psychiatric/Behavioral:  Negative for agitation, behavioral problems, confusion, decreased concentration and hallucinations. The patient is not nervous/anxious.      Vital signs in last 24 hours  Temp:  [36.1 °C (97 °F)-36.9 °C (98.4 °F)] 36.8 °C (98.2 °F)  Pulse:  [57-72] 64  Resp:  [18] 18  BP: (104-119)/(58-71) 104/65  SpO2:  [90 %-96 %] 96 %    Physical Exam  Physical Exam  Vitals and nursing note reviewed.   Constitutional:       General: He is not in acute distress.     Appearance: Normal appearance. He is normal weight. He is not ill-appearing, toxic-appearing or diaphoretic.   HENT:      Head:  Normocephalic and atraumatic.      Right Ear: Ear canal and external ear normal.      Left Ear: Ear canal and external ear normal.      Nose: Nose normal. No congestion or rhinorrhea.      Mouth/Throat:      Mouth: Mucous membranes are moist.      Pharynx: Oropharynx is clear. No oropharyngeal exudate or posterior oropharyngeal erythema.   Eyes:      General: No scleral icterus.        Right eye: No discharge.         Left eye: No discharge.      Extraocular Movements: Extraocular movements intact.      Conjunctiva/sclera: Conjunctivae normal.      Pupils: Pupils are equal, round, and reactive to light.   Neck:      Vascular: No carotid bruit.   Cardiovascular:      Rate and Rhythm: Normal rate and regular rhythm.      Pulses: Normal pulses.      Heart sounds: Normal heart sounds. No murmur heard.    No gallop.   Pulmonary:      Effort: Pulmonary effort is normal. No respiratory distress.      Breath sounds: Normal breath sounds. No stridor. No wheezing, rhonchi or rales.   Abdominal:      General: Abdomen is flat. Bowel sounds are normal. There is no distension.      Palpations: Abdomen is soft. There is no mass.      Tenderness: There is no abdominal tenderness. There is no guarding or rebound.      Hernia: No hernia is present.   Musculoskeletal:         General: No swelling or tenderness. Normal range of motion.      Cervical back: Normal range of motion and neck supple. No rigidity. No muscular tenderness.      Right lower leg: No edema.      Left lower leg: No edema.   Lymphadenopathy:      Cervical: No cervical adenopathy.   Skin:     General: Skin is warm and dry.      Capillary Refill: Capillary refill takes 2 to 3 seconds.      Coloration: Skin is not jaundiced or pale.      Findings: No bruising or lesion.   Neurological:      General: No focal deficit present.      Mental Status: He is alert and oriented to person, place, and time. Mental status is at baseline.      Cranial Nerves: No cranial nerve  deficit.      Motor: No weakness.   Psychiatric:         Mood and Affect: Mood normal.         Behavior: Behavior normal.         Thought Content: Thought content normal.         Judgment: Judgment normal.       Lab Review  Lab Results   Component Value Date/Time    WBC 4.0 (L) 10/04/2022 12:18 AM    RBC 4.39 (L) 10/04/2022 12:18 AM    HEMOGLOBIN 12.9 (L) 10/04/2022 12:18 AM    HEMATOCRIT 38.0 (L) 10/04/2022 12:18 AM    MCV 86.6 10/04/2022 12:18 AM    MCH 30.1 10/04/2022 12:18 AM    MCHC 34.5 10/04/2022 12:18 AM    MPV 11.0 10/04/2022 12:18 AM      Lab Results   Component Value Date/Time    SODIUM 134 (L) 10/04/2022 12:18 AM    POTASSIUM 4.3 10/04/2022 12:18 AM    CHLORIDE 101 10/04/2022 12:18 AM    CO2 13 (L) 10/04/2022 12:18 AM    GLUCOSE 220 (H) 10/04/2022 12:18 AM    BUN 18 10/04/2022 12:18 AM    CREATININE 0.73 10/04/2022 12:18 AM      Lab Results   Component Value Date/Time    ASTSGOT 106 (H) 10/04/2022 12:18 AM    ALTSGPT 33 10/04/2022 12:18 AM     Lab Results   Component Value Date/Time    CHOLSTRLTOT 176 10/04/2022 12:18 AM    LDL see below 10/04/2022 12:18 AM    HDL see below 10/04/2022 12:18 AM    TRIGLYCERIDE 1624 (H) 10/04/2022 12:18 AM    TROPONINT 1672 (H) 10/03/2022 06:45 AM       Recent Labs     10/03/22  0435   NTPROBNP 21       Cardiac Imaging and Procedures Review  EKG:  My personal interpretation of the EKG dated 10/3/2022 is NSR, inferior STEMI    Echocardiogram:  Dated 10/3/2022 personally reviewed and interpreted by myself showing:  Normal LV systolic function, no valvular heart diease    Cardiac Catheterization:  Dated 10/3/2022, personally reviewded and interpreted by myself showing RCA culprit, mid LAD disease 70-80%    Imaging  Chest X-Ray:  NA     Stress Test:  NA    Assessment/Plan  No new Assessment & Plan notes have been filed under this hospital service since the last note was generated.  Service: Cardiology  52-year-old male with CAD status post STEMI with residual disease of the  LAD.  Given the fact that he was walking around the unit with no symptoms implies that his chest pain overnight is unlikely to be his LAD and therefore we have canceled his cath for today.  I have focused on guideline directed medical therapy for his CAD.  We will have him follow-up in the outpatient cardiology clinic in 2 to 4 weeks for scheduling his outpatient stent to his LAD.  He would benefit from the addition of an SGLT2 to his medical therapy.    Thank you for allowing me to participate in the care of this patient.  I will continue to follow this patient    Please contact me with any questions.    Prince Isbell M.D.   Cardiologist, [facility]  [phone]

## 2022-10-05 ENCOUNTER — PHARMACY VISIT (OUTPATIENT)
Dept: PHARMACY | Facility: MEDICAL CENTER | Age: 52
End: 2022-10-05
Payer: COMMERCIAL

## 2022-10-05 VITALS
HEART RATE: 58 BPM | BODY MASS INDEX: 29.45 KG/M2 | WEIGHT: 222.22 LBS | RESPIRATION RATE: 17 BRPM | TEMPERATURE: 97.9 F | OXYGEN SATURATION: 97 % | DIASTOLIC BLOOD PRESSURE: 73 MMHG | SYSTOLIC BLOOD PRESSURE: 114 MMHG | HEIGHT: 73 IN

## 2022-10-05 PROBLEM — I21.3 STEMI (ST ELEVATION MYOCARDIAL INFARCTION) (HCC): Status: RESOLVED | Noted: 2022-10-03 | Resolved: 2022-10-05

## 2022-10-05 PROBLEM — Z09 HOSPITAL DISCHARGE FOLLOW-UP: Status: ACTIVE | Noted: 2022-10-05

## 2022-10-05 PROBLEM — Z00.00 PREVENTATIVE HEALTH CARE: Status: ACTIVE | Noted: 2022-10-05

## 2022-10-05 LAB
ALBUMIN SERPL BCP-MCNC: 4 G/DL (ref 3.2–4.9)
ALBUMIN/GLOB SERPL: 1.7 G/DL
ALP SERPL-CCNC: 86 U/L (ref 30–99)
ALT SERPL-CCNC: 21 U/L (ref 2–50)
ANION GAP SERPL CALC-SCNC: 14 MMOL/L (ref 7–16)
AST SERPL-CCNC: 36 U/L (ref 12–45)
B-OH-BUTYR SERPL-MCNC: 0.06 MMOL/L (ref 0.02–0.27)
BILIRUB SERPL-MCNC: 0.5 MG/DL (ref 0.1–1.5)
BUN SERPL-MCNC: 13 MG/DL (ref 8–22)
CALCIUM SERPL-MCNC: 8.7 MG/DL (ref 8.5–10.5)
CHLORIDE SERPL-SCNC: 104 MMOL/L (ref 96–112)
CO2 SERPL-SCNC: 20 MMOL/L (ref 20–33)
CREAT SERPL-MCNC: 0.7 MG/DL (ref 0.5–1.4)
ERYTHROCYTE [DISTWIDTH] IN BLOOD BY AUTOMATED COUNT: 43.7 FL (ref 35.9–50)
GFR SERPLBLD CREATININE-BSD FMLA CKD-EPI: 109 ML/MIN/1.73 M 2
GLOBULIN SER CALC-MCNC: 2.4 G/DL (ref 1.9–3.5)
GLUCOSE BLD STRIP.AUTO-MCNC: 146 MG/DL (ref 65–99)
GLUCOSE BLD STRIP.AUTO-MCNC: 193 MG/DL (ref 65–99)
GLUCOSE SERPL-MCNC: 256 MG/DL (ref 65–99)
HCT VFR BLD AUTO: 37.3 % (ref 42–52)
HGB BLD-MCNC: 13.6 G/DL (ref 14–18)
LACTATE SERPL-SCNC: 2 MMOL/L (ref 0.5–2)
MAGNESIUM SERPL-MCNC: 1.9 MG/DL (ref 1.5–2.5)
MCH RBC QN AUTO: 31.1 PG (ref 27–33)
MCHC RBC AUTO-ENTMCNC: 36.5 G/DL (ref 33.7–35.3)
MCV RBC AUTO: 85.2 FL (ref 81.4–97.8)
PLATELET # BLD AUTO: 142 K/UL (ref 164–446)
PMV BLD AUTO: 10.7 FL (ref 9–12.9)
POTASSIUM SERPL-SCNC: 4 MMOL/L (ref 3.6–5.5)
PROT SERPL-MCNC: 6.4 G/DL (ref 6–8.2)
RBC # BLD AUTO: 4.38 M/UL (ref 4.7–6.1)
SODIUM SERPL-SCNC: 138 MMOL/L (ref 135–145)
WBC # BLD AUTO: 3.4 K/UL (ref 4.8–10.8)

## 2022-10-05 PROCEDURE — 700102 HCHG RX REV CODE 250 W/ 637 OVERRIDE(OP): Performed by: PHYSICIAN ASSISTANT

## 2022-10-05 PROCEDURE — 80053 COMPREHEN METABOLIC PANEL: CPT

## 2022-10-05 PROCEDURE — 700102 HCHG RX REV CODE 250 W/ 637 OVERRIDE(OP): Performed by: INTERNAL MEDICINE

## 2022-10-05 PROCEDURE — A9270 NON-COVERED ITEM OR SERVICE: HCPCS | Performed by: INTERNAL MEDICINE

## 2022-10-05 PROCEDURE — 82962 GLUCOSE BLOOD TEST: CPT

## 2022-10-05 PROCEDURE — 83735 ASSAY OF MAGNESIUM: CPT

## 2022-10-05 PROCEDURE — A9270 NON-COVERED ITEM OR SERVICE: HCPCS | Performed by: PHYSICIAN ASSISTANT

## 2022-10-05 PROCEDURE — 99233 SBSQ HOSP IP/OBS HIGH 50: CPT | Performed by: INTERNAL MEDICINE

## 2022-10-05 PROCEDURE — 36415 COLL VENOUS BLD VENIPUNCTURE: CPT

## 2022-10-05 PROCEDURE — 700111 HCHG RX REV CODE 636 W/ 250 OVERRIDE (IP)

## 2022-10-05 PROCEDURE — 82010 KETONE BODYS QUAN: CPT

## 2022-10-05 PROCEDURE — 83605 ASSAY OF LACTIC ACID: CPT

## 2022-10-05 PROCEDURE — 99239 HOSP IP/OBS DSCHRG MGMT >30: CPT | Mod: GC | Performed by: INTERNAL MEDICINE

## 2022-10-05 PROCEDURE — 85027 COMPLETE CBC AUTOMATED: CPT

## 2022-10-05 PROCEDURE — RXMED WILLOW AMBULATORY MEDICATION CHARGE: Performed by: STUDENT IN AN ORGANIZED HEALTH CARE EDUCATION/TRAINING PROGRAM

## 2022-10-05 RX ORDER — ACETAMINOPHEN 325 MG/1
650 TABLET ORAL EVERY 6 HOURS PRN
Status: DISCONTINUED | OUTPATIENT
Start: 2022-10-05 | End: 2022-10-05 | Stop reason: HOSPADM

## 2022-10-05 RX ORDER — DIPHENHYDRAMINE HYDROCHLORIDE 25 MG/1
CAPSULE, LIQUID FILLED ORAL
Qty: 1 KIT | Refills: 0 | Status: ON HOLD | OUTPATIENT
Start: 2022-10-05 | End: 2022-11-16

## 2022-10-05 RX ORDER — LANCETS 30 GAUGE
EACH MISCELLANEOUS
Qty: 100 EACH | Refills: 0 | Status: SHIPPED | OUTPATIENT
Start: 2022-10-05 | End: 2022-11-03

## 2022-10-05 RX ORDER — ASPIRIN 81 MG/1
81 TABLET, CHEWABLE ORAL DAILY
Qty: 100 TABLET | Refills: 0 | Status: SHIPPED | OUTPATIENT
Start: 2022-10-06 | End: 2022-11-03

## 2022-10-05 RX ORDER — ATORVASTATIN CALCIUM 80 MG/1
80 TABLET, FILM COATED ORAL EVERY EVENING
Status: DISCONTINUED | OUTPATIENT
Start: 2022-10-05 | End: 2022-10-05 | Stop reason: HOSPADM

## 2022-10-05 RX ORDER — CARVEDILOL 6.25 MG/1
6.25 TABLET ORAL 2 TIMES DAILY WITH MEALS
Qty: 60 TABLET | Refills: 2 | Status: SHIPPED | OUTPATIENT
Start: 2022-10-05 | End: 2022-10-06 | Stop reason: SDUPTHER

## 2022-10-05 RX ORDER — PROMETHAZINE HYDROCHLORIDE 25 MG/1
12.5-25 TABLET ORAL EVERY 4 HOURS PRN
Status: DISCONTINUED | OUTPATIENT
Start: 2022-10-05 | End: 2022-10-05 | Stop reason: HOSPADM

## 2022-10-05 RX ORDER — ONDANSETRON 4 MG/1
4 TABLET, ORALLY DISINTEGRATING ORAL EVERY 4 HOURS PRN
Status: DISCONTINUED | OUTPATIENT
Start: 2022-10-05 | End: 2022-10-05 | Stop reason: HOSPADM

## 2022-10-05 RX ORDER — PRASUGREL 10 MG/1
10 TABLET, FILM COATED ORAL DAILY
Status: DISCONTINUED | OUTPATIENT
Start: 2022-10-06 | End: 2022-10-05 | Stop reason: HOSPADM

## 2022-10-05 RX ORDER — AMOXICILLIN 250 MG
2 CAPSULE ORAL 2 TIMES DAILY
Status: DISCONTINUED | OUTPATIENT
Start: 2022-10-05 | End: 2022-10-05 | Stop reason: HOSPADM

## 2022-10-05 RX ORDER — INSULIN LISPRO 100 [IU]/ML
8 INJECTION, SOLUTION INTRAVENOUS; SUBCUTANEOUS
Status: DISCONTINUED | OUTPATIENT
Start: 2022-10-05 | End: 2022-10-05 | Stop reason: HOSPADM

## 2022-10-05 RX ORDER — PRASUGREL 10 MG/1
10 TABLET, FILM COATED ORAL DAILY
Qty: 30 TABLET | Refills: 2 | Status: SHIPPED | OUTPATIENT
Start: 2022-10-06 | End: 2022-10-06 | Stop reason: SDUPTHER

## 2022-10-05 RX ORDER — FENOFIBRATE 67 MG/1
67 CAPSULE ORAL DAILY
Qty: 30 CAPSULE | Refills: 0 | Status: SHIPPED | OUTPATIENT
Start: 2022-10-06 | End: 2022-10-06 | Stop reason: SDUPTHER

## 2022-10-05 RX ORDER — POLYETHYLENE GLYCOL 3350 17 G/17G
1 POWDER, FOR SOLUTION ORAL
Status: DISCONTINUED | OUTPATIENT
Start: 2022-10-05 | End: 2022-10-05 | Stop reason: HOSPADM

## 2022-10-05 RX ORDER — ATORVASTATIN CALCIUM 80 MG/1
80 TABLET, FILM COATED ORAL EVERY EVENING
Qty: 30 TABLET | Refills: 2 | Status: SHIPPED | OUTPATIENT
Start: 2022-10-05 | End: 2022-10-06 | Stop reason: SDUPTHER

## 2022-10-05 RX ORDER — BISACODYL 10 MG
10 SUPPOSITORY, RECTAL RECTAL
Status: DISCONTINUED | OUTPATIENT
Start: 2022-10-05 | End: 2022-10-05 | Stop reason: HOSPADM

## 2022-10-05 RX ORDER — CARVEDILOL 6.25 MG/1
6.25 TABLET ORAL 2 TIMES DAILY WITH MEALS
Status: DISCONTINUED | OUTPATIENT
Start: 2022-10-05 | End: 2022-10-05 | Stop reason: HOSPADM

## 2022-10-05 RX ORDER — INSULIN LISPRO 100 [IU]/ML
8 INJECTION, SOLUTION INTRAVENOUS; SUBCUTANEOUS
Qty: 3 ML | Refills: 2 | Status: SHIPPED | OUTPATIENT
Start: 2022-10-05 | End: 2022-11-03

## 2022-10-05 RX ORDER — FENOFIBRATE 67 MG/1
67 CAPSULE ORAL DAILY
Status: DISCONTINUED | OUTPATIENT
Start: 2022-10-06 | End: 2022-10-05 | Stop reason: HOSPADM

## 2022-10-05 RX ORDER — ASPIRIN 81 MG/1
81 TABLET ORAL DAILY
Qty: 30 TABLET | Refills: 2 | Status: SHIPPED | OUTPATIENT
Start: 2022-10-06

## 2022-10-05 RX ORDER — FENOFIBRATE 67 MG/1
67 CAPSULE ORAL DAILY
Qty: 30 CAPSULE | Refills: 0 | Status: SHIPPED | OUTPATIENT
Start: 2022-10-06 | End: 2022-11-03

## 2022-10-05 RX ORDER — CARVEDILOL 6.25 MG/1
6.25 TABLET ORAL 2 TIMES DAILY WITH MEALS
Qty: 60 TABLET | Refills: 0 | Status: SHIPPED | OUTPATIENT
Start: 2022-10-05 | End: 2022-11-03

## 2022-10-05 RX ORDER — ASPIRIN 81 MG/1
81 TABLET, CHEWABLE ORAL DAILY
Status: DISCONTINUED | OUTPATIENT
Start: 2022-10-06 | End: 2022-10-05 | Stop reason: HOSPADM

## 2022-10-05 RX ORDER — ATORVASTATIN CALCIUM 80 MG/1
80 TABLET, FILM COATED ORAL EVERY EVENING
Qty: 30 TABLET | Refills: 0 | Status: SHIPPED | OUTPATIENT
Start: 2022-10-05 | End: 2022-11-03

## 2022-10-05 RX ORDER — MAGNESIUM SULFATE 1 G/100ML
1 INJECTION INTRAVENOUS ONCE
Status: COMPLETED | OUTPATIENT
Start: 2022-10-05 | End: 2022-10-05

## 2022-10-05 RX ORDER — CHLORAL HYDRATE 500 MG
2000 CAPSULE ORAL
Status: DISCONTINUED | OUTPATIENT
Start: 2022-10-05 | End: 2022-10-05 | Stop reason: HOSPADM

## 2022-10-05 RX ORDER — LISINOPRIL 5 MG/1
5 TABLET ORAL
Status: DISCONTINUED | OUTPATIENT
Start: 2022-10-06 | End: 2022-10-05 | Stop reason: HOSPADM

## 2022-10-05 RX ORDER — LISINOPRIL 5 MG/1
5 TABLET ORAL DAILY
Qty: 30 TABLET | Refills: 0 | Status: SHIPPED | OUTPATIENT
Start: 2022-10-06 | End: 2022-11-03

## 2022-10-05 RX ORDER — LISINOPRIL 5 MG/1
5 TABLET ORAL DAILY
Qty: 30 TABLET | Refills: 2 | Status: SHIPPED | OUTPATIENT
Start: 2022-10-06 | End: 2022-10-06 | Stop reason: SDUPTHER

## 2022-10-05 RX ORDER — PRASUGREL 10 MG/1
10 TABLET, FILM COATED ORAL DAILY
Qty: 30 TABLET | Refills: 0 | Status: SHIPPED | OUTPATIENT
Start: 2022-10-06 | End: 2022-11-03

## 2022-10-05 RX ORDER — INSULIN GLARGINE 100 [IU]/ML
25 INJECTION, SOLUTION SUBCUTANEOUS EVERY EVENING
Qty: 3 ML | Refills: 2 | Status: SHIPPED | OUTPATIENT
Start: 2022-10-05 | End: 2022-11-03

## 2022-10-05 RX ADMIN — PRASUGREL 10 MG: 10 TABLET, FILM COATED ORAL at 05:54

## 2022-10-05 RX ADMIN — METFORMIN HYDROCHLORIDE 500 MG: 500 TABLET ORAL at 08:36

## 2022-10-05 RX ADMIN — NICOTINE TRANSDERMAL SYSTEM 21 MG: 21 PATCH, EXTENDED RELEASE TRANSDERMAL at 05:55

## 2022-10-05 RX ADMIN — OMEGA-3 FATTY ACIDS CAP 1000 MG 1000 MG: 1000 CAP at 08:36

## 2022-10-05 RX ADMIN — MAGNESIUM SULFATE IN DEXTROSE 1 G: 10 INJECTION, SOLUTION INTRAVENOUS at 08:35

## 2022-10-05 RX ADMIN — FENOFIBRATE 67 MG: 67 CAPSULE ORAL at 05:54

## 2022-10-05 RX ADMIN — INSULIN LISPRO 3 UNITS: 100 INJECTION, SOLUTION INTRAVENOUS; SUBCUTANEOUS at 09:05

## 2022-10-05 RX ADMIN — LISINOPRIL 5 MG: 5 TABLET ORAL at 05:56

## 2022-10-05 RX ADMIN — INSULIN LISPRO 8 UNITS: 100 INJECTION, SOLUTION INTRAVENOUS; SUBCUTANEOUS at 13:13

## 2022-10-05 RX ADMIN — CARVEDILOL 6.25 MG: 6.25 TABLET, FILM COATED ORAL at 08:36

## 2022-10-05 RX ADMIN — ASPIRIN 81 MG: 81 TABLET, COATED ORAL at 05:55

## 2022-10-05 RX ADMIN — INSULIN LISPRO 8 UNITS: 100 INJECTION, SOLUTION INTRAVENOUS; SUBCUTANEOUS at 09:04

## 2022-10-05 ASSESSMENT — ENCOUNTER SYMPTOMS
NERVOUS/ANXIOUS: 0
POLYDIPSIA: 0
TREMORS: 0
DECREASED CONCENTRATION: 0
ACTIVITY CHANGE: 0
SEIZURES: 0
EYE PAIN: 0
POLYPHAGIA: 0
COUGH: 0
AGITATION: 0
ADENOPATHY: 0
CONSTIPATION: 0
CHILLS: 0
APPETITE CHANGE: 0
WEAKNESS: 0
COLOR CHANGE: 0
STRIDOR: 0
DIARRHEA: 0
EYE ITCHING: 0
ABDOMINAL PAIN: 0
SPEECH DIFFICULTY: 0
NUMBNESS: 0
HALLUCINATIONS: 0
EYE REDNESS: 0
CHEST TIGHTNESS: 0
FLANK PAIN: 0
DIAPHORESIS: 0
CONFUSION: 0
CHOKING: 0
ABDOMINAL DISTENTION: 0
PALPITATIONS: 0
MYALGIAS: 0
BACK PAIN: 0
LIGHT-HEADEDNESS: 0
BRUISES/BLEEDS EASILY: 0
NAUSEA: 0
JOINT SWELLING: 0
EYE DISCHARGE: 0

## 2022-10-05 ASSESSMENT — PAIN DESCRIPTION - PAIN TYPE: TYPE: ACUTE PAIN

## 2022-10-05 NOTE — PROGRESS NOTES
Cardiology Follow Up Progress Note    Date of Service  10/5/2022    Attending Physician  Jean Paul Andrews M.D.    Chief Complaint   STEMI    PEACE Mohr is a 52 y.o. male admitted 10/3/2022 with STEMi    Interim Events  No chest pain  Continues to ambulate  Tolerating DAPT  Bp stable    Review of Systems  Review of Systems   Constitutional:  Negative for activity change, appetite change, chills and diaphoresis.   Eyes:  Negative for pain, discharge, redness and itching.   Respiratory:  Negative for cough, choking, chest tightness and stridor.    Cardiovascular:  Negative for palpitations.   Gastrointestinal:  Negative for abdominal distention, abdominal pain, constipation, diarrhea and nausea.   Endocrine: Negative for cold intolerance, heat intolerance, polydipsia and polyphagia.   Genitourinary:  Negative for difficulty urinating, dysuria, enuresis, flank pain, frequency, genital sores and hematuria.   Musculoskeletal:  Negative for back pain, gait problem, joint swelling and myalgias.   Skin:  Negative for color change, pallor and rash.   Neurological:  Negative for tremors, seizures, syncope, speech difficulty, weakness, light-headedness and numbness.   Hematological:  Negative for adenopathy. Does not bruise/bleed easily.   Psychiatric/Behavioral:  Negative for agitation, behavioral problems, confusion, decreased concentration and hallucinations. The patient is not nervous/anxious.      Vital signs in last 24 hours  Temp:  [36.6 °C (97.9 °F)-37 °C (98.6 °F)] 36.6 °C (97.9 °F)  Pulse:  [58-69] 58  Resp:  [16-19] 17  BP: ()/(51-79) 114/73  SpO2:  [94 %-98 %] 97 %    Physical Exam  Physical Exam  Vitals and nursing note reviewed.   Constitutional:       General: He is not in acute distress.     Appearance: Normal appearance. He is normal weight. He is not ill-appearing, toxic-appearing or diaphoretic.   HENT:      Head: Normocephalic and atraumatic.      Right Ear: Ear canal and external ear normal.       Left Ear: Ear canal and external ear normal.      Nose: Nose normal. No congestion or rhinorrhea.      Mouth/Throat:      Mouth: Mucous membranes are moist.      Pharynx: Oropharynx is clear. No oropharyngeal exudate or posterior oropharyngeal erythema.   Eyes:      General: No scleral icterus.        Right eye: No discharge.         Left eye: No discharge.      Extraocular Movements: Extraocular movements intact.      Conjunctiva/sclera: Conjunctivae normal.      Pupils: Pupils are equal, round, and reactive to light.   Neck:      Vascular: No carotid bruit.   Cardiovascular:      Rate and Rhythm: Normal rate and regular rhythm.      Pulses: Normal pulses.      Heart sounds: Normal heart sounds. No murmur heard.    No gallop.   Pulmonary:      Effort: Pulmonary effort is normal. No respiratory distress.      Breath sounds: Normal breath sounds. No stridor. No wheezing, rhonchi or rales.   Abdominal:      General: Abdomen is flat. Bowel sounds are normal. There is no distension.      Palpations: Abdomen is soft. There is no mass.      Tenderness: There is no abdominal tenderness. There is no guarding or rebound.      Hernia: No hernia is present.   Musculoskeletal:         General: No swelling or tenderness. Normal range of motion.      Cervical back: Normal range of motion and neck supple. No rigidity. No muscular tenderness.      Right lower leg: No edema.      Left lower leg: No edema.   Lymphadenopathy:      Cervical: No cervical adenopathy.   Skin:     General: Skin is warm and dry.      Capillary Refill: Capillary refill takes 2 to 3 seconds.      Coloration: Skin is not jaundiced or pale.      Findings: No bruising or lesion.   Neurological:      General: No focal deficit present.      Mental Status: He is alert and oriented to person, place, and time. Mental status is at baseline.      Cranial Nerves: No cranial nerve deficit.      Motor: No weakness.   Psychiatric:         Mood and Affect: Mood normal.          Behavior: Behavior normal.         Thought Content: Thought content normal.         Judgment: Judgment normal.       Lab Review  Lab Results   Component Value Date/Time    WBC 3.4 (L) 10/05/2022 01:58 AM    RBC 4.38 (L) 10/05/2022 01:58 AM    HEMOGLOBIN 13.6 (L) 10/05/2022 01:58 AM    HEMATOCRIT 37.3 (L) 10/05/2022 01:58 AM    MCV 85.2 10/05/2022 01:58 AM    MCH 31.1 10/05/2022 01:58 AM    MCHC 36.5 (H) 10/05/2022 01:58 AM    MPV 10.7 10/05/2022 01:58 AM      Lab Results   Component Value Date/Time    SODIUM 138 10/05/2022 01:58 AM    POTASSIUM 4.0 10/05/2022 01:58 AM    CHLORIDE 104 10/05/2022 01:58 AM    CO2 20 10/05/2022 01:58 AM    GLUCOSE 256 (H) 10/05/2022 01:58 AM    BUN 13 10/05/2022 01:58 AM    CREATININE 0.70 10/05/2022 01:58 AM      Lab Results   Component Value Date/Time    ASTSGOT 36 10/05/2022 01:58 AM    ALTSGPT 21 10/05/2022 01:58 AM     Lab Results   Component Value Date/Time    CHOLSTRLTOT 176 10/04/2022 12:18 AM    LDL see below 10/04/2022 12:18 AM    HDL see below 10/04/2022 12:18 AM    TRIGLYCERIDE 1624 (H) 10/04/2022 12:18 AM    TROPONINT 1672 (H) 10/03/2022 06:45 AM       Recent Labs     10/03/22  0435   NTPROBNP 21       Cardiac Imaging and Procedures Review  EKG:  My personal interpretation of the EKG dated 10/3/2022 is NSR, inferior STEMI     Echocardiogram:  Dated 10/3/2022 personally reviewed and interpreted by myself showing:  Normal LV systolic function, no valvular heart diease     Cardiac Catheterization:  Dated 10/3/2022, personally reviewded and interpreted by myself showing RCA culprit, mid LAD disease 70-80%     Imaging  Chest X-Ray:  NA      Stress Test:  NA    Assessment/Plan  No new Assessment & Plan notes have been filed under this hospital service since the last note was generated.  Service: Cardiology  52-year-old male with CAD status post STEMI the RCA with stenting and residual disease in the LAD.  We will continue him on dual antiplatelet therapy.  His LAD will  eventually need outpatient follow-up.  Please schedule him for outpatient follow-up in the cardiology clinic in 2 to 4 weeks.  He will also need maximal medical therapy for secondary prevention including his high triglycerides.  If possible please increase his fish oil to 2003 times per day.  Cardiology will sign off.  Please call back with further questions.    Thank you for allowing me to participate in the care of this patient.  Cardiology will sign off on this patient    Please contact me with any questions.    Prince Isbell M.D.   Cardiologist, [facility]  [phone]

## 2022-10-05 NOTE — HOSPITAL COURSE
Jace Mohr is a 51 y/o male with limited medical history presenting 10/03/22 with chest pain.  EKG showed ST-elevations in inferior leads and troponin= 1672. Patient was immediately placed on Aspirin 81, Lipitor 80mg, Prasugrel 10mg, heparin gtt and nitroglycerin and then taken to Cath lab. PCI found severe obstructive three vessel disease, but acutely significant for  proximal occluding thrombus in the RCA. Two stents were placed in the proximal RCA and the posterior descending coronary artery. Patient complained of mild chest pain the day following PCI which resolved with nitroglycerin; patient has an appointment with cardiology in 4 weeks to assess need for a staged stent of LAD. Patient was discharged on Aspirin 81mg, prasugrel 10mg, and with blood pressure management with lisinopril 5mg and coreg 6.25mg BID.    In addition, patient was found to have elevated blood glucose, Hgb-A1c=8.0, DP=9593, and was in mild DKA. This is a new diagnosis of diabetes mellitus for this patient. C-peptide and insulin antibody labs were sent out to differential latent-autoimmune diabetes in adults vs. Type 2 diabetes mellitus. These labs are pending at discharge. Patient's blood glucose was appropriately managed and patient was discharged on Lispro 8U TID AC, Lantus 25 qPM, and Metformin 500 BID, and Fenofibrate 67mg.

## 2022-10-05 NOTE — DISCHARGE PLANNING
Case Management Discharge Planning    Admission Date: 10/3/2022  GMLOS: 2.1  ALOS: 2    6-Clicks ADL Score: 24  6-Clicks Mobility Score: 24      Anticipated Discharge Dispo: Discharge Disposition: Discharged to home/self care (01)  Discharge Address: [address]  [place] [address]    DME Needed: No    Action(s) Taken: Case discussed with Dr. Cheema, patient will be clear for discharge this afternoon.  MD asked care coordination to assist the patient in getting a PCP appointment.  RN CM [facility], patient was scheduled with Dr. Dustin Terry for tomorrow 10/06 at 1445.  RN CM updated the patient at bedside, patient is ok with this appointment.  Patient stated that his wife will provide a ride home this afternoon.    Escalations Completed: Schedulers    Medically Clear: Yes    Next Steps: Care coordination available to discuss any further discharge barriers.    Barriers to Discharge: None    Is the patient up for discharge tomorrow: Discharging today

## 2022-10-05 NOTE — DISCHARGE SUMMARY
[facility] Discharge Summary    Attending: Jean Paul Andrews M.d.  Senior Resident: Dr. Cheema  Intern:  Dr. Posada  Contact Number: [phone]    CHIEF COMPLAINT ON ADMISSION  Chest Pain.      Reason for Admission  Stemi     Admission Date  10/3/2022    CODE STATUS  Full Code    HPI & HOSPITAL COURSE  This is a 52 y.o. male here with Chest Pain.   Jace Mohr is a 51 y/o male with limited medical history presenting 10/03/22 with chest pain.  EKG showed ST-elevations in inferior leads and troponin= 1672. Patient was immediately placed on Aspirin 81, Lipitor 80mg, Prasugrel 10mg, heparin gtt and nitroglycerin and then taken to Cath lab. PCI found severe obstructive three vessel disease, but acutely significant for  proximal occluding thrombus in the RCA. Two stents were placed in the proximal RCA and the posterior descending coronary artery. Patient complained of mild chest pain the day following PCI which resolved with nitroglycerin; patient has an appointment with cardiology in 4 weeks to assess need for a staged stent of LAD. Patient was discharged on Aspirin 81mg, prasugrel 10mg, and with blood pressure management with lisinopril 5mg and coreg 6.25mg BID.    In addition, patient was found to have elevated blood glucose, Hgb-A1c=8.0, UR=4890, and was in mild DKA. This is a new diagnosis of diabetes mellitus for this patient. C-peptide and insulin antibody labs were sent out to differential latent-autoimmune diabetes in adults vs. Type 2 diabetes mellitus. These labs are pending at discharge. Patient's blood glucose was appropriately managed and patient was discharged on Lispro 8U TID AC, Lantus 25 qPM, and Metformin 500 BID, and Fenofibrate 67mg.     Therefore, he is discharged in good and stable condition to home with close outpatient follow-up.    The patient recovered much more quickly than anticipated on admission.    Discharge Date  10/05/22    Physical Exam on Day of Discharge  Physical Exam  Vitals  and nursing note reviewed.   Constitutional:       General: He is not in acute distress.     Appearance: Normal appearance. He is normal weight.   HENT:      Head: Normocephalic and atraumatic.      Nose: Nose normal. No rhinorrhea.   Cardiovascular:      Rate and Rhythm: Normal rate and regular rhythm.      Pulses: Normal pulses.      Heart sounds: No murmur (Unable to appreciate) heard.  Pulmonary:      Effort: Pulmonary effort is normal. No respiratory distress.      Breath sounds: Normal breath sounds.   Abdominal:      General: Abdomen is flat. There is no distension.      Palpations: Abdomen is soft.   Musculoskeletal:         General: No swelling, deformity or signs of injury.      Right lower leg: No edema.      Left lower leg: No edema.   Skin:     General: Skin is warm.      Findings: No erythema.   Neurological:      General: No focal deficit present.      Mental Status: He is alert and oriented to person, place, and time. Mental status is at baseline.      Motor: No weakness.   Psychiatric:         Mood and Affect: Mood normal.         Behavior: Behavior normal.         Thought Content: Thought content normal.         Judgment: Judgment normal.       FOLLOW UP ITEMS POST DISCHARGE  Cardiology to assess need for staged LAD stent  PCP to follow up on diabetic labs including C-peptide and insulin antibodies; after determination of DM2 or LORENZO manage appropriately.     DISCHARGE DIAGNOSES  Principal Problem (Resolved):    STEMI (ST elevation myocardial infarction) (HCC) POA: Yes  Active Problems:    Hyperglycemia POA: Yes    STEMI involving right coronary artery (HCC) POA: Yes    Nicotine dependence POA: Yes      FOLLOW UP  Future Appointments   Date Time Provider Department Center   10/6/2022  3:00 PM GERTRUDIS Reyes   11/3/2022  9:15 AM ESTRADA Vicente [facility] None     [facility]  [address]  [address]  [facility] [id number] [phone]  Call  Your  doctor has referred you for Cardiac Rehab which is important in your recovery. Please call to make an appointment.      MEDICATIONS ON DISCHARGE     Medication List        START taking these medications        Instructions   aspirin 81 MG EC tablet  Start taking on: October 6, 2022   Take 1 Tablet by mouth every day.  Dose: 81 mg     atorvastatin 80 MG tablet  Commonly known as: LIPITOR   Take 1 Tablet by mouth every evening.  Dose: 80 mg     * Blood Glucose Monitor System w/Device Kit   Doctor's comments: Or per formulary preference. ICD-10 code: E11.65 Uncontrolled type 2 Diabetes Mellitus  Test blood sugar as recommended by provider.     * Blood Glucose Test Strips   Doctor's comments: Or per formulary preference. ICD-10 code: E11.65 Uncontrolled type 2 Diabetes Mellitus  Use one One Touch Verio Flex strip to test blood sugar three times daily before meals.     carvedilol 6.25 MG Tabs  Commonly known as: COREG   Take 1 Tablet by mouth 2 times a day with meals.  Dose: 6.25 mg     fenofibrate micronized 67 MG capsule  Start taking on: October 6, 2022  Commonly known as: LOFIBRA   Take 1 Capsule by mouth every day.  Dose: 67 mg     insulin lispro 100 UNIT/ML Sopn injection PEN  Commonly known as: HumaLOG,AdmeLOG   Inject 8 Units under the skin 3 times a day before meals.  Dose: 8 Units     Insulin Pen Needle 32 G x 4 mm   Doctor's comments: Per patient/formulary preference. ICD-10 code: E11.65 Uncontrolled type 2 Diabetes Mellitus  Use one pen needle in pen device to inject insulin three times daily.     Lancets   Doctor's comments: Or per formulary preference. ICD-10 code: E11.65 Uncontrolled type 2 Diabetes Mellitus  Use one  lancet to test blood sugar three times daily before meals.     Lantus SoloStar 100 UNIT/ML Sopn injection  Generic drug: insulin glargine   Inject 25 Units under the skin every evening.  Dose: 25 Units     lisinopril 5 MG Tabs  Start taking on: October 6, 2022  Commonly known as: PRINIVIL    Take 1 Tablet by mouth every day.  Dose: 5 mg     metFORMIN 500 MG Tabs  Commonly known as: GLUCOPHAGE   Take 1 Tablet by mouth 2 times a day with meals.  Dose: 500 mg     prasugrel 10 MG Tabs  Start taking on: October 6, 2022  Commonly known as: EFFIENT   Take 1 Tablet by mouth every day.  Dose: 10 mg           * This list has 2 medication(s) that are the same as other medications prescribed for you. Read the directions carefully, and ask your doctor or other care provider to review them with you.                  Allergies  No Known Allergies    DIET  Orders Placed This Encounter   Procedures    Diet Order Diet: Cardiac; Second Modifier: (optional): Consistent CHO (Diabetic)     Standing Status:   Standing     Number of Occurrences:   1     Order Specific Question:   Diet:     Answer:   Cardiac [6]     Order Specific Question:   Second Modifier: (optional)     Answer:   Consistent CHO (Diabetic) [4]       ACTIVITY  As tolerated.  Weight bearing as tolerated    CONSULTATIONS  Cardiology    PROCEDURES  PCI 10/03/22: Severe obstructive three-vessel coronary artery disease involving the left anterior descending, ramus intermedius, and right coronary arteries.  Acute thrombotic occlusion of the proximal right coronary artery successfully treated with one 3.0 x 48 mm Synergy drug-eluting stent.  Successful percutaneous coronary intervention to the distal right coronary artery into the posterior descending coronary artery with one 2.5 x 24 mm Synergy drug-eluting stent. Normal left ventricular systolic function and left heart filling pressures.    LABORATORY  Lab Results   Component Value Date    SODIUM 138 10/05/2022    POTASSIUM 4.0 10/05/2022    CHLORIDE 104 10/05/2022    CO2 20 10/05/2022    GLUCOSE 256 (H) 10/05/2022    BUN 13 10/05/2022    CREATININE 0.70 10/05/2022        Lab Results   Component Value Date    WBC 3.4 (L) 10/05/2022    HEMOGLOBIN 13.6 (L) 10/05/2022    HEMATOCRIT 37.3 (L) 10/05/2022    PLATELETCT  142 (L) 10/05/2022        Total time of the discharge process exceeds 45 minutes.

## 2022-10-05 NOTE — PROGRESS NOTES
Received bedside report from RN, pt care assumed, VSS, pt assessment complete. Pt AAOx4, with 0/10 of pain at this time. No signs of acute distress noted at this time. Plan of care discussed with pt and verbalizes no questions. Pt denies any additional needs at this time. Bed locked/in lowest position, pt educated on fall risk and verbalized understanding, call light within reach, hourly rounding initiated.

## 2022-10-05 NOTE — CONSULTS
Diabetes education: Pt is newly dx with diabetes and post MI. Pt is a  [place]. Pt was admitted with blood sugar of 391, and Hga1c of 8.0%. Pt is currently on Glargine 20 units pm with Lispro 6 units tid meals and Lispro per sliding scale coverage tid meals. Blood sugar was 256 ( 7 + 6 units). Pt was able to give all insulin to abdomen with CDE. Pt was also able to stick his finger with RN for finger stick.  Discussed what diabetes was, what effects blood sugars, goals for blood sugars, Hga1c and insulin. Pt would like his wife present for education ( she will be back tomorrow).  Plan: Pt to continue to practice skills with nursing. CDE will return when wife is available to complete education, give meter and teach insulin pens. Please order Lantus solostar pens, Humalog kwik pens, with scale written out and for ac only, pen needles, One touch verio flex meter, strips and lancets. Please use diabetes supplies and F2 for correct meter and supplies. [facility]. Please send text to CDE when wife is in the room.

## 2022-10-05 NOTE — PROGRESS NOTES
Diabetes education: Met with pt this evening. Please see consult note.  Plan: Pt to continue to practice skills with nursing. CDE will return when wife is available to complete education, give meter and teach insulin pens. Please order Lantus solostar pens, Humalog kwik pens, with scale written out and for ac only, pen needles, One touch verio flex meter, strips and lancets. Please use diabetes supplies and F2 for correct meter and supplies. [facility]. Please send text to CDE when wife is in the room.

## 2022-10-05 NOTE — CARE PLAN
The patient is Stable - Low risk of patient condition declining or worsening    Shift Goals  Clinical Goals: reinforce DM education  Patient Goals: remain pain free  Family Goals: LATASHA    Progress made toward(s) clinical / shift goals:    Problem: Knowledge Deficit - Standard  Goal: Patient and family/care givers will demonstrate understanding of plan of care, disease process/condition, diagnostic tests and medications  Outcome: Progressing     Problem: Pain - Standard  Goal: Alleviation of pain or a reduction in pain to the patient’s comfort goal  Outcome: Progressing       Patient is not progressing towards the following goals:

## 2022-10-06 ENCOUNTER — HOSPITAL ENCOUNTER (OUTPATIENT)
Facility: MEDICAL CENTER | Age: 52
End: 2022-10-06
Attending: STUDENT IN AN ORGANIZED HEALTH CARE EDUCATION/TRAINING PROGRAM
Payer: COMMERCIAL

## 2022-10-06 ENCOUNTER — OFFICE VISIT (OUTPATIENT)
Dept: MEDICAL GROUP | Facility: MEDICAL CENTER | Age: 52
End: 2022-10-06
Payer: COMMERCIAL

## 2022-10-06 VITALS
WEIGHT: 202 LBS | BODY MASS INDEX: 26.77 KG/M2 | HEART RATE: 80 BPM | OXYGEN SATURATION: 97 % | SYSTOLIC BLOOD PRESSURE: 110 MMHG | TEMPERATURE: 98 F | RESPIRATION RATE: 20 BRPM | HEIGHT: 73 IN | DIASTOLIC BLOOD PRESSURE: 68 MMHG

## 2022-10-06 DIAGNOSIS — F17.219 CIGARETTE NICOTINE DEPENDENCE WITH NICOTINE-INDUCED DISORDER: ICD-10-CM

## 2022-10-06 DIAGNOSIS — Z23 NEED FOR VACCINATION: ICD-10-CM

## 2022-10-06 DIAGNOSIS — E11.9 TYPE 2 DIABETES MELLITUS WITHOUT COMPLICATION, WITHOUT LONG-TERM CURRENT USE OF INSULIN (HCC): ICD-10-CM

## 2022-10-06 DIAGNOSIS — Z76.89 ENCOUNTER TO ESTABLISH CARE WITH NEW DOCTOR: ICD-10-CM

## 2022-10-06 DIAGNOSIS — I21.11 STEMI INVOLVING RIGHT CORONARY ARTERY (HCC): ICD-10-CM

## 2022-10-06 DIAGNOSIS — E78.5 DYSLIPIDEMIA: ICD-10-CM

## 2022-10-06 DIAGNOSIS — Z09 HOSPITAL DISCHARGE FOLLOW-UP: ICD-10-CM

## 2022-10-06 DIAGNOSIS — Z00.00 PREVENTATIVE HEALTH CARE: ICD-10-CM

## 2022-10-06 PROBLEM — E11.40 TYPE 2 DIABETES MELLITUS WITH DIABETIC NEUROPATHY, WITHOUT LONG-TERM CURRENT USE OF INSULIN (HCC): Status: ACTIVE | Noted: 2022-10-06

## 2022-10-06 PROCEDURE — 99204 OFFICE O/P NEW MOD 45 MIN: CPT | Mod: 25 | Performed by: STUDENT IN AN ORGANIZED HEALTH CARE EDUCATION/TRAINING PROGRAM

## 2022-10-06 PROCEDURE — 82043 UR ALBUMIN QUANTITATIVE: CPT

## 2022-10-06 PROCEDURE — 90686 IIV4 VACC NO PRSV 0.5 ML IM: CPT | Performed by: STUDENT IN AN ORGANIZED HEALTH CARE EDUCATION/TRAINING PROGRAM

## 2022-10-06 PROCEDURE — 82570 ASSAY OF URINE CREATININE: CPT

## 2022-10-06 PROCEDURE — 99406 BEHAV CHNG SMOKING 3-10 MIN: CPT | Performed by: STUDENT IN AN ORGANIZED HEALTH CARE EDUCATION/TRAINING PROGRAM

## 2022-10-06 PROCEDURE — 90471 IMMUNIZATION ADMIN: CPT | Performed by: STUDENT IN AN ORGANIZED HEALTH CARE EDUCATION/TRAINING PROGRAM

## 2022-10-06 RX ORDER — GABAPENTIN 300 MG/1
300 CAPSULE ORAL NIGHTLY
Qty: 30 CAPSULE | Refills: 2 | Status: SHIPPED | OUTPATIENT
Start: 2022-10-06 | End: 2022-11-05

## 2022-10-06 RX ORDER — PRASUGREL 10 MG/1
10 TABLET, FILM COATED ORAL DAILY
Qty: 90 TABLET | Refills: 3 | Status: SHIPPED | OUTPATIENT
Start: 2022-10-06 | End: 2023-01-04

## 2022-10-06 RX ORDER — NICOTINE 21 MG/24HR
1 PATCH, TRANSDERMAL 24 HOURS TRANSDERMAL EVERY 24 HOURS
Qty: 30 PATCH | Refills: 2 | Status: ON HOLD | OUTPATIENT
Start: 2022-10-06 | End: 2022-11-16

## 2022-10-06 RX ORDER — FENOFIBRATE 67 MG/1
67 CAPSULE ORAL DAILY
Qty: 90 CAPSULE | Refills: 3 | Status: SHIPPED | OUTPATIENT
Start: 2022-10-06 | End: 2023-01-04

## 2022-10-06 RX ORDER — ATORVASTATIN CALCIUM 80 MG/1
80 TABLET, FILM COATED ORAL EVERY EVENING
Qty: 90 TABLET | Refills: 3 | Status: SHIPPED | OUTPATIENT
Start: 2022-10-06 | End: 2023-12-04

## 2022-10-06 RX ORDER — CARVEDILOL 6.25 MG/1
6.25 TABLET ORAL 2 TIMES DAILY WITH MEALS
Qty: 60 TABLET | Refills: 2 | Status: SHIPPED | OUTPATIENT
Start: 2022-10-06 | End: 2023-04-06

## 2022-10-06 RX ORDER — LISINOPRIL 5 MG/1
5 TABLET ORAL DAILY
Qty: 30 TABLET | Refills: 2 | Status: SHIPPED | OUTPATIENT
Start: 2022-10-06 | End: 2022-11-03 | Stop reason: SDUPTHER

## 2022-10-06 ASSESSMENT — FIBROSIS 4 INDEX: FIB4 SCORE: 2.99

## 2022-10-06 NOTE — PROGRESS NOTES
Subjective:     CC:  Diagnoses of Hospital discharge follow-up, STEMI involving right coronary artery (HCC), Type 2 diabetes mellitus without complication, without long-term current use of insulin (HCC), Dyslipidemia, Cigarette nicotine dependence with nicotine-induced disorder, BMI 26.0-26.9,adult, Preventative health care, Need for vaccination, and Encounter to establish care with new doctor were pertinent to this visit.    HISTORY OF THE PRESENT ILLNESS: Patient is a 52 y.o. male. This pleasant patient is here today to establish care and discuss hospital discharge follow up. His prior PCP was none.    Problem   Dyslipidemia    New condition. Recent lipid panel with TG in the 1600s. He was started on fenofibrate.    Current regimen: fenofibrate 67mg daily     Type 2 Diabetes Mellitus With Diabetic Neuropathy, Without Long-Term Current Use of Insulin (Hcc)    New condition. Newly diagnosed diabetes with A1C 8.0. He was sent home from hospital with metformin 500mg BID, Lantus and Humalog.    Current regimen: metformin 500mg BID, Lantus 25 units qhs, Humalog 8 units TID     Bmi 26.0-26.9,Adult    Chronic condition. He eats fair diet in general. He does not regularly exercise.     Hospital Discharge Follow-Up    Acute condition.     Per hospital discharge note:  Jace Mohr is a 51 y/o male with limited medical history presenting 10/03/22 with chest pain.  EKG showed ST-elevations in inferior leads and troponin= 1672. Patient was immediately placed on Aspirin 81, Lipitor 80mg, Prasugrel 10mg, heparin gtt and nitroglycerin and then taken to Cath lab. PCI found severe obstructive three vessel disease, but acutely significant for  proximal occluding thrombus in the RCA. Two stents were placed in the proximal RCA and the posterior descending coronary artery. Patient complained of mild chest pain the day following PCI which resolved with nitroglycerin; patient has an appointment with cardiology in 4 weeks to assess need  for a staged stent of LAD. Patient was discharged on Aspirin 81mg, prasugrel 10mg, and with blood pressure management with lisinopril 5mg and coreg 6.25mg BID.     In addition, patient was found to have elevated blood glucose, Hgb-A1c=8.0, WG=9492, and was in mild DKA. This is a new diagnosis of diabetes mellitus for this patient. C-peptide and insulin antibody labs were sent out to differential latent-autoimmune diabetes in adults vs. Type 2 diabetes mellitus. These labs are pending at discharge. Patient's blood glucose was appropriately managed and patient was discharged on Lispro 8U TID AC, Lantus 25 qPM, and Metformin 500 BID, and Fenofibrate 67mg.      Therefore, he is discharged in good and stable condition to home with close outpatient follow-up.     Preventative Health Care    Patient is here to establish care today.     Stemi Involving Right Coronary Artery (Hcc)    New condition. He had recent STEMI s/p stenting on 10/5/2022. He has follow up appointment with cardiology on 11/3/2022.    Current regimen: aspirin 81mg daily, prasugrel 10mg daily, carvedilol 6.25mg BID, lisinopril 5mg daily, atorvastatin 80mg daily     Per hospital note:  PCI found severe obstructive three vessel disease, but acutely significant for  proximal occluding thrombus in the RCA. Two stents were placed in the proximal RCA and the posterior descending coronary artery. Patient complained of mild chest pain the day following PCI which resolved with nitroglycerin; patient has an appointment with cardiology in 4 weeks to assess need for a staged stent of LAD. Patient was discharged on Aspirin 81mg, prasugrel 10mg, and with blood pressure management with lisinopril 5mg and coreg 6.25mg BID.     Nicotine Dependence    Chronic condition.  He has been smoking 1ppd since age 12. He has stopped smoking recently since his recent STEMI.         Current Outpatient Medications Ordered in Epic   Medication Sig Dispense Refill    nicotine (NICODERM)  21 MG/24HR PATCH 24 HR Place 1 Patch on the skin every 24 hours. 30 Patch 2    atorvastatin (LIPITOR) 80 MG tablet Take 1 Tablet by mouth every evening. 90 Tablet 3    carvedilol (COREG) 6.25 MG Tab Take 1 Tablet by mouth 2 times a day with meals. 60 Tablet 2    fenofibrate micronized (LOFIBRA) 67 MG capsule Take 1 Capsule by mouth every day for 90 days. 90 Capsule 3    lisinopril (PRINIVIL) 5 MG Tab Take 1 Tablet by mouth every day. 30 Tablet 2    metFORMIN (GLUCOPHAGE) 500 MG Tab Take 2 Tablets by mouth 2 times a day with meals for 90 days. 360 Tablet 3    prasugrel (EFFIENT) 10 MG Tab Take 1 Tablet by mouth every day for 90 days. 90 Tablet 3    SITagliptin (JANUVIA) 100 MG Tab Take 1 Tablet by mouth every day for 90 days. 90 Tablet 3    gabapentin (NEURONTIN) 300 MG Cap Take 1 Capsule by mouth every evening for 30 days. 30 Capsule 2    aspirin 81 MG EC tablet Take 1 Tablet by mouth every day. 30 Tablet 2    insulin glargine (LANTUS SOLOSTAR) 100 UNIT/ML Solution Pen-injector injection Inject 25 Units under the skin every evening. 3 mL 2    insulin lispro (HUMALOG,ADMELOG) 100 UNIT/ML Solution Pen-injector injection PEN Inject 8 Units under the skin 3 times a day before meals. 3 mL 2    Blood Glucose Monitoring Suppl (BLOOD GLUCOSE MONITOR SYSTEM) w/Device Kit Test blood sugar as recommended by provider. 1 Kit 0    Insulin Pen Needle 32 G x 4 mm Use one pen needle in pen device to inject insulin three times daily. 100 Each 0    Lancets (ONETOUCH DELICA PLUS [id number]) Misc Use one  lancet to test blood sugar three times daily before meals. 100 Each 0    glucose blood strip Use one strip to test blood sugar three times daily before meals. 100 Strip 0    aspirin (ASA) 81 MG Chew Tab chewable tablet 1 Tablet by Enteral Tube route every day. 100 Tablet 0    atorvastatin (LIPITOR) 80 MG tablet 1 Tablet by Enteral Tube route every evening. 30 Tablet 0    carvedilol (COREG) 6.25 MG Tab 1 Tablet by Enteral Tube route 2  "times a day with meals. 60 Tablet 0    fenofibrate micronized (LOFIBRA) 67 MG capsule 1 Capsule by Enteral Tube route every day. 30 Capsule 0    lisinopril (PRINIVIL) 5 MG Tab 1 Tablet by Enteral Tube route every day. 30 Tablet 0    metFORMIN (GLUCOPHAGE) 500 MG Tab 1 Tablet by Enteral Tube route 2 times a day with meals. 60 Tablet 0    prasugrel (EFFIENT) 10 MG Tab 1 Tablet by Enteral Tube route every day. 30 Tablet 0     No current Epic-ordered facility-administered medications on file.     Social history  Living situation: originally from [place]  Occupation: works as   Marital status:   Alcohol/tobacco/illicit drugs: smokes 1ppd since age 12, just quit, rare EtOH, denies illicit drugs  Diet/Exercise: Eats fair diet in general. No regular exercise.    ROS:   Gen: no fevers/chills, no changes in weight  Eyes: no changes in vision  ENT: no sore throat, no bloody nose  Pulm: no sob, no cough  CV: no chest pain, no palpitations  GI: no nausea/vomiting, no diarrhea  : no dysuria  MSk: no myalgias  Skin: no rash  Neuro: no headaches, no numbness/tingling  Heme/Lymph: no easy bruising      Objective:     Exam: /68 (BP Location: Left arm, Patient Position: Sitting, BP Cuff Size: Adult)   Pulse 80   Temp 36.7 °C (98 °F) (Temporal)   Resp 20   Ht 1.854 m (6' 1\")   Wt 91.6 kg (202 lb)   SpO2 97%  Body mass index is 26.65 kg/m².    General: Normal appearing. No distress.  HEENT: Normocephalic.  Neck: Supple without JVD or bruit. Thyroid is not enlarged.  Pulmonary: Clear to ausculation. Normal effort. No rales, ronchi, or wheezing.  Cardiovascular: Regular rate and rhythm without murmur. Carotid and radial pulses are intact and equal bilaterally.  Abdomen: Soft, nontender, nondistended. Normal bowel sounds.  Neurologic: Grossly nonfocal  Skin: Warm and dry. No obvious lesions.  Musculoskeletal: Normal gait. No extremity cyanosis, clubbing, or edema.  Psych: Normal mood and affect. Alert and " oriented x3. Judgment and insight is normal.    Monofilament testing with a 10 gram force: sensation intact: decreased bilaterally  Visual Inspection: Feet without maceration, ulcers, fissures.  Pedal pulses: intact bilaterally    Labs:   Lab Results   Component Value Date/Time    WBC 3.4 (L) 10/05/2022 01:58 AM    RBC 4.38 (L) 10/05/2022 01:58 AM    HEMOGLOBIN 13.6 (L) 10/05/2022 01:58 AM    HEMATOCRIT 37.3 (L) 10/05/2022 01:58 AM    MCV 85.2 10/05/2022 01:58 AM    MCH 31.1 10/05/2022 01:58 AM    MCHC 36.5 (H) 10/05/2022 01:58 AM    RDW 43.7 10/05/2022 01:58 AM    PLATELETCT 142 (L) 10/05/2022 01:58 AM    MPV 10.7 10/05/2022 01:58 AM      Lab Results   Component Value Date/Time    SODIUM 138 10/05/2022 01:58 AM    POTASSIUM 4.0 10/05/2022 01:58 AM    CHLORIDE 104 10/05/2022 01:58 AM    CO2 20 10/05/2022 01:58 AM    ANION 14.0 10/05/2022 01:58 AM    GLUCOSE 256 (H) 10/05/2022 01:58 AM    BUN 13 10/05/2022 01:58 AM    CREATININE 0.70 10/05/2022 01:58 AM    CALCIUM 8.7 10/05/2022 01:58 AM    ASTSGOT 36 10/05/2022 01:58 AM    ALTSGPT 21 10/05/2022 01:58 AM    TBILIRUBIN 0.5 10/05/2022 01:58 AM    ALBUMIN 4.0 10/05/2022 01:58 AM    TOTPROTEIN 6.4 10/05/2022 01:58 AM    GLOBULIN 2.4 10/05/2022 01:58 AM    AGRATIO 1.7 10/05/2022 01:58 AM     Lab Results   Component Value Date/Time    HBA1C 8.0 (H) 10/03/2022 06:45 AM      Lab Results   Component Value Date/Time    CHOLSTRLTOT 176 10/04/2022 0018    TRIGLYCERIDE 1624 (H) 10/04/2022 0018    HDL see below 10/04/2022 0018    LDL see below 10/04/2022 0018     Lab Results   Component Value Date/Time    TSHULTRASEN 1.500 10/04/2022 1133       Assessment & Plan:   52 y.o. male with the following -    1. Hospital discharge follow-up  Acute condition, stable.  - continue scheduled follow up with cardiology  - Chart and discharge summary were reviewed.   - Hospitalization and results reviewed with patient.   - Medications reviewed including instructions regarding high risk  medications, dosing and side effects.  - Recommended Services: No services needed at this time    2. STEMI involving right coronary artery (HCC)  New condition.  - cont current regimen: aspirin 81mg daily, prasugrel 10mg daily, carvedilol 6.25mg BID, lisinopril 5mg daily, atorvastatin 80mg daily  - cont scheduled appointment with cardiology 11/2022  - carvedilol (COREG) 6.25 MG Tab; Take 1 Tablet by mouth 2 times a day with meals.  Dispense: 60 Tablet; Refill: 2  - lisinopril (PRINIVIL) 5 MG Tab; Take 1 Tablet by mouth every day.  Dispense: 30 Tablet; Refill: 2  - prasugrel (EFFIENT) 10 MG Tab; Take 1 Tablet by mouth every day for 90 days.  Dispense: 90 Tablet; Refill: 3    3. Type 2 diabetes mellitus without complication, without long-term current use of insulin (HCC)  New condition.  - increase metformin to 1000mg BID, add Januvia 100mg daily, wean off Lantus by 3-4 units every 3-4 days  - add gabapentin for neuropathy  - metFORMIN (GLUCOPHAGE) 500 MG Tab; Take 2 Tablets by mouth 2 times a day with meals for 90 days.  Dispense: 360 Tablet; Refill: 3  - SITagliptin (JANUVIA) 100 MG Tab; Take 1 Tablet by mouth every day for 90 days.  Dispense: 90 Tablet; Refill: 3  - MICROALBUMIN CREAT RATIO URINE; Future  - Diabetic Monofilament LE Exam  - gabapentin (NEURONTIN) 300 MG Cap; Take 1 Capsule by mouth every evening for 30 days.  Dispense: 30 Capsule; Refill: 2    4. Dyslipidemia  New condition.  - cont current regimen: atorvastatin 80mg daily, fenofibrate 67mg daily  - atorvastatin (LIPITOR) 80 MG tablet; Take 1 Tablet by mouth every evening.  Dispense: 90 Tablet; Refill: 3  - fenofibrate micronized (LOFIBRA) 67 MG capsule; Take 1 Capsule by mouth every day for 90 days.  Dispense: 90 Capsule; Refill: 3    5. Cigarette nicotine dependence with nicotine-induced disorder  - education and brief counseling on tobacco cessation provided (time spent 8 minutes), pt was encouraged to quit smoking, cessation methods/medications  were recommended and/or discussed   - pt desires medication/method to assist with smoking cessation   - starting nicotine patches   - nicotine (NICODERM) 21 MG/24HR PATCH 24 HR; Place 1 Patch on the skin every 24 hours.  Dispense: 30 Patch; Refill: 2    6. BMI 26.0-26.9,adult    7. Preventative health care  - Recommended age appropriate vaccinations    8. Need for vaccination  - INFLUENZA VACCINE QUAD INJ (PF)    9. Encounter to establish care with new doctor        Return in about 6 weeks (around 11/17/2022) for chronic medical conditions.    Please note that this dictation was created using voice recognition software. I have made every reasonable attempt to correct obvious errors, but I expect that there are errors of grammar and possibly content that I did not discover before finalizing the note.

## 2022-10-06 NOTE — LETTER
2022    To Whom It May Concern:         This is confirmation that Jace Mohr (: 1970) attended his scheduled appointment with Dustin Terry D.O. on 10/06/22.    Patient was recently hospitalized for acute medical condition from 10/3/2022-10/5/2022. He is cleared to return to work duties without restriction on Saturday 10/15/2022.         If you have any questions please do not hesitate to call me at the phone number listed below.    Sincerely,          Dustin Terry D.O.  [phone]

## 2022-10-07 LAB
C PEPTIDE SERPL-MCNC: 5.5 NG/ML (ref 0.5–3.3)
CREAT UR-MCNC: 90.73 MG/DL
MICROALBUMIN UR-MCNC: 1.4 MG/DL
MICROALBUMIN/CREAT UR: 15 MG/G (ref 0–30)

## 2022-10-11 LAB — INSULIN HUMAN AB SER-ACNC: <0.4 U/ML (ref 0–0.4)

## 2022-10-13 ENCOUNTER — OFFICE VISIT (OUTPATIENT)
Dept: MEDICAL GROUP | Facility: MEDICAL CENTER | Age: 52
End: 2022-10-13
Payer: COMMERCIAL

## 2022-10-13 VITALS
SYSTOLIC BLOOD PRESSURE: 122 MMHG | OXYGEN SATURATION: 100 % | HEIGHT: 69 IN | WEIGHT: 203.4 LBS | DIASTOLIC BLOOD PRESSURE: 74 MMHG | TEMPERATURE: 98.2 F | RESPIRATION RATE: 16 BRPM | BODY MASS INDEX: 30.13 KG/M2 | HEART RATE: 75 BPM

## 2022-10-13 DIAGNOSIS — E11.40 TYPE 2 DIABETES MELLITUS WITH DIABETIC NEUROPATHY, WITHOUT LONG-TERM CURRENT USE OF INSULIN (HCC): ICD-10-CM

## 2022-10-13 DIAGNOSIS — Z02.89 ENCOUNTER FOR COMPLETION OF FORM WITH PATIENT: ICD-10-CM

## 2022-10-13 DIAGNOSIS — I21.11 STEMI INVOLVING RIGHT CORONARY ARTERY (HCC): ICD-10-CM

## 2022-10-13 DIAGNOSIS — F17.219 CIGARETTE NICOTINE DEPENDENCE WITH NICOTINE-INDUCED DISORDER: ICD-10-CM

## 2022-10-13 PROCEDURE — 99214 OFFICE O/P EST MOD 30 MIN: CPT | Performed by: STUDENT IN AN ORGANIZED HEALTH CARE EDUCATION/TRAINING PROGRAM

## 2022-10-13 ASSESSMENT — FIBROSIS 4 INDEX: FIB4 SCORE: 2.99

## 2022-10-13 NOTE — PROGRESS NOTES
Subjective:     CC: FMLA paperwork    HPI:   Jace presents today for FMLA paperwork    Problem   Encounter for Completion of Form With Patient    Patient is here requesting completion of FMLA paperwork. He is requesting FMLA time off from 10/3/22-1/1/23.     Type 2 Diabetes Mellitus With Diabetic Neuropathy, Without Long-Term Current Use of Insulin (Hcc)    New condition. Newly diagnosed diabetes with A1C 8.0. Since last visit, medications were adjusted and he has been taking as instructed and tolerating well. He has since weaned off insulin therapy and feels well. Average fasting blood sugar 130-150s.     Current regimen: metformin 1000mg BID, Januvia 100mg daily, gabapentin 300mg qhs     Stemi Involving Right Coronary Artery (Hcc)    Recent condition. He had recent STEMI s/p stenting on 10/5/2022. He has follow up appointment with cardiology on 11/3/2022. He has been taking his medications as instructed and tolerating well. Denies chest pain, shortness of breath.    Current regimen: aspirin 81mg daily, prasugrel 10mg daily, carvedilol 6.25mg BID, lisinopril 5mg daily, atorvastatin 80mg daily     Per hospital note:  PCI found severe obstructive three vessel disease, but acutely significant for  proximal occluding thrombus in the RCA. Two stents were placed in the proximal RCA and the posterior descending coronary artery. Patient complained of mild chest pain the day following PCI which resolved with nitroglycerin; patient has an appointment with cardiology in 4 weeks to assess need for a staged stent of LAD. Patient was discharged on Aspirin 81mg, prasugrel 10mg, and with blood pressure management with lisinopril 5mg and coreg 6.25mg BID.     Nicotine Dependence    Chronic condition. Since last visit, he has been using nicotine patches.  He has been smoking 1ppd since age 12. He has stopped smoking recently since his recent STEMI.         Current Outpatient Medications Ordered in Epic   Medication Sig Dispense  Refill    nicotine (NICODERM) 21 MG/24HR PATCH 24 HR Place 1 Patch on the skin every 24 hours. 30 Patch 2    atorvastatin (LIPITOR) 80 MG tablet Take 1 Tablet by mouth every evening. 90 Tablet 3    carvedilol (COREG) 6.25 MG Tab Take 1 Tablet by mouth 2 times a day with meals. 60 Tablet 2    fenofibrate micronized (LOFIBRA) 67 MG capsule Take 1 Capsule by mouth every day for 90 days. 90 Capsule 3    lisinopril (PRINIVIL) 5 MG Tab Take 1 Tablet by mouth every day. 30 Tablet 2    metFORMIN (GLUCOPHAGE) 500 MG Tab Take 2 Tablets by mouth 2 times a day with meals for 90 days. 360 Tablet 3    prasugrel (EFFIENT) 10 MG Tab Take 1 Tablet by mouth every day for 90 days. 90 Tablet 3    SITagliptin (JANUVIA) 100 MG Tab Take 1 Tablet by mouth every day for 90 days. 90 Tablet 3    gabapentin (NEURONTIN) 300 MG Cap Take 1 Capsule by mouth every evening for 30 days. 30 Capsule 2    aspirin 81 MG EC tablet Take 1 Tablet by mouth every day. 30 Tablet 2    insulin glargine (LANTUS SOLOSTAR) 100 UNIT/ML Solution Pen-injector injection Inject 25 Units under the skin every evening. 3 mL 2    insulin lispro (HUMALOG,ADMELOG) 100 UNIT/ML Solution Pen-injector injection PEN Inject 8 Units under the skin 3 times a day before meals. 3 mL 2    Blood Glucose Monitoring Suppl (BLOOD GLUCOSE MONITOR SYSTEM) w/Device Kit Test blood sugar as recommended by provider. 1 Kit 0    Insulin Pen Needle 32 G x 4 mm Use one pen needle in pen device to inject insulin three times daily. 100 Each 0    Lancets (ONETOUCH DELICA PLUS AMICHL80A) Misc Use one  lancet to test blood sugar three times daily before meals. 100 Each 0    glucose blood strip Use one strip to test blood sugar three times daily before meals. 100 Strip 0    aspirin (ASA) 81 MG Chew Tab chewable tablet 1 Tablet by Enteral Tube route every day. 100 Tablet 0    atorvastatin (LIPITOR) 80 MG tablet 1 Tablet by Enteral Tube route every evening. 30 Tablet 0    carvedilol (COREG) 6.25 MG Tab 1  "Tablet by Enteral Tube route 2 times a day with meals. 60 Tablet 0    fenofibrate micronized (LOFIBRA) 67 MG capsule 1 Capsule by Enteral Tube route every day. 30 Capsule 0    lisinopril (PRINIVIL) 5 MG Tab 1 Tablet by Enteral Tube route every day. 30 Tablet 0    metFORMIN (GLUCOPHAGE) 500 MG Tab 1 Tablet by Enteral Tube route 2 times a day with meals. 60 Tablet 0    prasugrel (EFFIENT) 10 MG Tab 1 Tablet by Enteral Tube route every day. 30 Tablet 0     No current Epic-ordered facility-administered medications on file.     Social history  Living situation: originally from [place]  Occupation: works as   Marital status:   Alcohol/tobacco/illicit drugs: smokes 1ppd since age 12, just quit, rare EtOH, denies illicit drugs  Diet/Exercise: Eats fair diet in general. No regular exercise.     ROS:   Gen: no fevers/chills  Pulm: no sob, no cough  CV: no chest pain, no palpitations  GI: no nausea/vomiting, no diarrhea  MSk: no myalgias  Skin: no rash  Neuro: no headaches, no numbness/tingling  Heme/Lymph: no easy bruising    Objective:     Exam:  /74 (BP Location: Left arm, Patient Position: Sitting, BP Cuff Size: Adult)   Pulse 75   Temp 36.8 °C (98.2 °F) (Temporal)   Resp 16   Ht 1.753 m (5' 9\")   Wt 92.3 kg (203 lb 6.4 oz)   SpO2 100%   BMI 30.04 kg/m²  Body mass index is 30.04 kg/m².    General: Normal appearing. No distress.  Pulmonary: Clear to ausculation. Normal effort. No rales, ronchi, or wheezing.  Cardiovascular: Regular rate and rhythm without murmur. Carotid and radial pulses are intact and equal bilaterally.  Neurologic: Grossly nonfocal  Musculoskeletal: Normal gait. No extremity cyanosis, clubbing, or edema.  Psych: Normal mood and affect. Alert and oriented x3. Judgment and insight is normal.    Assessment & Plan:     52 y.o. male with the following -     1. Encounter for completion of form with patient  - [place] paperwork completed and returned to patient    2. STEMI " involving right coronary artery (HCC)  Recent condition, stable.  - cont current regimen: aspirin 81mg daily, prasugrel 10mg daily, carvedilol 6.25mg BID, lisinopril 5mg daily, atorvastatin 80mg daily  - cont scheduled appointment with cardiology 11/2022    3. Type 2 diabetes mellitus with diabetic neuropathy, without long-term current use of insulin (HCC)  Chronic condition, stable.  - cont current regimen: metformin 1000mg BID, Januvia 100mg daily, gabapentin 300mg qhs    4. Cigarette nicotine dependence with nicotine-induced disorder  Chronic condition, stable. Has remained tobacco free so far.  - cont current regimen: nicotine patches  - encouraged to continue tobacco cessation    Return in about 4 weeks (around 11/10/2022) for chronic medical conditions.    Please note that this dictation was created using voice recognition software. I have made every reasonable attempt to correct obvious errors, but I expect that there are errors of grammar and possibly content that I did not discover before finalizing the note.

## 2022-10-26 ENCOUNTER — TELEPHONE (OUTPATIENT)
Dept: CARDIOLOGY | Facility: MEDICAL CENTER | Age: 52
End: 2022-10-26
Payer: COMMERCIAL

## 2022-10-26 NOTE — TELEPHONE ENCOUNTER
Spoke with pt and confirmed this will be their first time seeing a cardiologist. Verified pt was referred by [facility]. Confirmed that all recent labs, notes, and cardiac testing are in pts chart. Appointment time, date, and location confirmed with patient.

## 2022-11-03 ENCOUNTER — OFFICE VISIT (OUTPATIENT)
Dept: CARDIOLOGY | Facility: MEDICAL CENTER | Age: 52
End: 2022-11-03
Payer: COMMERCIAL

## 2022-11-03 VITALS
RESPIRATION RATE: 16 BRPM | SYSTOLIC BLOOD PRESSURE: 134 MMHG | DIASTOLIC BLOOD PRESSURE: 86 MMHG | WEIGHT: 204 LBS | BODY MASS INDEX: 30.21 KG/M2 | HEIGHT: 69 IN | OXYGEN SATURATION: 100 % | HEART RATE: 88 BPM

## 2022-11-03 DIAGNOSIS — I21.11 STEMI INVOLVING RIGHT CORONARY ARTERY (HCC): ICD-10-CM

## 2022-11-03 DIAGNOSIS — E78.5 DYSLIPIDEMIA: ICD-10-CM

## 2022-11-03 DIAGNOSIS — I25.10 CORONARY ARTERY DISEASE INVOLVING NATIVE CORONARY ARTERY OF NATIVE HEART WITHOUT ANGINA PECTORIS: ICD-10-CM

## 2022-11-03 PROCEDURE — 99214 OFFICE O/P EST MOD 30 MIN: CPT | Performed by: NURSE PRACTITIONER

## 2022-11-03 RX ORDER — LISINOPRIL 5 MG/1
5 TABLET ORAL DAILY
Qty: 90 TABLET | Refills: 3 | Status: SHIPPED | OUTPATIENT
Start: 2022-11-03 | End: 2024-03-21 | Stop reason: SDUPTHER

## 2022-11-03 ASSESSMENT — FIBROSIS 4 INDEX: FIB4 SCORE: 2.99

## 2022-11-03 NOTE — LETTER
November 3, 2022        To who this may concern,         Jace Mohr YUVAL [birth year], recently suffered from an acute heart attack on 10/3/2022 in which he received intervention on 2 of his coronary arteries with stents.  Patient did have a normal echocardiogram post heart attack, showing normal heart function.  Currently there is no indication to obtain a stress test this soon after a heart attack as patient is asymptomatic and the only results that we would see are that of his recent heart attack.  Given that he has been recovering for the past month and is not having any symptoms of chest discomfort or shortness of breath with exercising he is cleared from a cardiology perspective to return to work without any restrictions.  He remains on adequate medical management to prevent any further cardiac complications.    If there are any further concerns or questions please feel free to reach out to me.              BRANT Vicente.   [facility]  [phone]                                  BRANT Vicente.

## 2022-11-03 NOTE — PATIENT INSTRUCTIONS
Checking Blood Pressure:  -Blood pressure cuff, spend in the $40-65, with good return policy, [employer] is the best brand  -It should be automatic, upper arm, measure your arm to get the correct size, probably adult Large but your arm should be under 16.5 cm. If you need an XL cuff, you will have to have it special ordered from a pharmacy or durable medical equipment company.  -Put the cuff in place, rest arm on table near height of your heart, sit quietly for 5 min, legs uncrossed, with back support, then take your blood pressure, write it down, keep a log  -Check no more than 1 time day, maybe 4-5 times per week, try different times of day.  -Can bring your cuff to at least one appointment where it can be calibrated to a manual cuff if you are concerned.  -Goal blood pressure is at least under 130/80, ideally under 120/80.  If you think your BP is overall too high, let us know in the office, we can adjust medications, can use [employer] or call the [place] office: [phone].

## 2022-11-09 ENCOUNTER — HOSPITAL ENCOUNTER (OUTPATIENT)
Dept: LAB | Facility: MEDICAL CENTER | Age: 52
End: 2022-11-09
Attending: NURSE PRACTITIONER
Payer: COMMERCIAL

## 2022-11-09 DIAGNOSIS — I25.10 CORONARY ARTERY DISEASE INVOLVING NATIVE CORONARY ARTERY OF NATIVE HEART WITHOUT ANGINA PECTORIS: ICD-10-CM

## 2022-11-09 DIAGNOSIS — I21.11 STEMI INVOLVING RIGHT CORONARY ARTERY (HCC): ICD-10-CM

## 2022-11-09 DIAGNOSIS — E78.5 DYSLIPIDEMIA: ICD-10-CM

## 2022-11-09 LAB
CHOLEST SERPL-MCNC: 109 MG/DL (ref 100–199)
FASTING STATUS PATIENT QL REPORTED: NORMAL
HDLC SERPL-MCNC: 23 MG/DL
LDLC SERPL CALC-MCNC: ABNORMAL MG/DL
TRIGL SERPL-MCNC: 412 MG/DL (ref 0–149)

## 2022-11-09 PROCEDURE — 36415 COLL VENOUS BLD VENIPUNCTURE: CPT

## 2022-11-09 PROCEDURE — 80061 LIPID PANEL: CPT

## 2022-11-10 ENCOUNTER — TELEPHONE (OUTPATIENT)
Dept: CARDIOLOGY | Facility: MEDICAL CENTER | Age: 52
End: 2022-11-10
Payer: COMMERCIAL

## 2022-11-10 NOTE — TELEPHONE ENCOUNTER
----- Message from ESTRADA Vicente sent at 11/9/2022  4:13 PM PST -----  Regarding: RE: Need New EPIC order  Done ! Correctly this time.   Sorry     ----- Message -----  From: Thomas Dominguez  Sent: 11/9/2022   3:59 PM PST  To: ESTRADA Vicente  Subject: Need New EPIC order                              I need a new order put in Epic for C w/poss with Staged PCI put into comments and I need to be ancillary not IP Please  ----- Message -----  From: ESTRADA Vicente  Sent: 11/9/2022   1:55 PM PST  To: Juan Eckert M.D.  Subject: RE: [facility] w/PCI scheduling                         Sorry I thought I had placed the order, we did discuss the staged PCI at his appt.  Thomas can you get him scheduled now with Michael?    Thanks     ----- Message -----  From: Juan Rodriguez M.D.  Sent: 11/7/2022   1:18 PM PST  To: Ethel Eckert A.P.R.N.  Subject: RE: [facility] w/PCI scheduling                         Is he ready for staged PCI now [facility]? If so, can you put in an order for that.    Thanks    ----- Message -----  From: Thomas Dominguez  Sent: 11/7/2022   1:14 PM PST  To: Juan Rodriguez M.D., #  Subject: C w/PCI scheduling                             I was not made aware that he needed to be scheduled for one. I have not gotten a message or have seen it ordered in Epic.If you can order it in [facility], I can get it scheduled with you. Thank you  ----- Message -----  From: Juan Rodriguez M.D.  Sent: 11/7/2022  12:56 PM PST  To: Ethel Eckert A.P.R.N.    Has he been scheduled with me for staged PCI? I did his first case and would like to complete the work myself.    Thanks,    Jean Paul    ----- Message -----  From: ESTRADA Vicente  Sent: 11/3/2022   4:52 PM PST  To: Juan Rodriguez M.D.    Ok thanks,  we'll get him scheduled with you again     ----- Message -----  From: Juan Rodriguez M.D.  Sent: 11/3/2022   3:54 PM PDT  To: Ethel Ferrer  A.P.R.N.    Just needs to be more than 30 days post-discharge.    ----- Message -----  From: ESTRADA Vicente  Sent: 11/3/2022  11:46 AM PDT  To: SKYLAR Wong Dr    Saw the nancy at F/U today. You cathed him, has 70% mid LAD stenosis, you recommended staged PCI.  He is feeling great, no angina or FLOREZ. What do think the timeframe should be on that given he is managing fine and stable on medical therapy currently.    Thanks,    Ethel

## 2022-11-14 ENCOUNTER — TELEPHONE (OUTPATIENT)
Dept: CARDIOLOGY | Facility: MEDICAL CENTER | Age: 52
End: 2022-11-14
Payer: COMMERCIAL

## 2022-11-14 NOTE — TELEPHONE ENCOUNTER
Patient is scheduled on 11-16-22 for a C w/PCI with Dr. Rodriguez. Patient was told to hold metformin and januvia AM day of procedure and to hold metformin for 48hrs after. Patient to check in at 9:30 for an 11:30 procedure. H&P was done on 11-3-22 by  Ethel Ferrer. Pre admit to call patient.

## 2022-11-15 ENCOUNTER — PRE-ADMISSION TESTING (OUTPATIENT)
Dept: ADMISSIONS | Facility: MEDICAL CENTER | Age: 52
End: 2022-11-15
Attending: INTERNAL MEDICINE
Payer: COMMERCIAL

## 2022-11-15 DIAGNOSIS — Z01.810 PRE-OPERATIVE CARDIOVASCULAR EXAMINATION: ICD-10-CM

## 2022-11-15 DIAGNOSIS — Z01.812 PRE-OPERATIVE LABORATORY EXAMINATION: ICD-10-CM

## 2022-11-15 LAB
ALBUMIN SERPL BCP-MCNC: 4.9 G/DL (ref 3.2–4.9)
ALBUMIN/GLOB SERPL: 1.7 G/DL
ALP SERPL-CCNC: 73 U/L (ref 30–99)
ALT SERPL-CCNC: 24 U/L (ref 2–50)
ANION GAP SERPL CALC-SCNC: 12 MMOL/L (ref 7–16)
AST SERPL-CCNC: 23 U/L (ref 12–45)
BILIRUB SERPL-MCNC: 1 MG/DL (ref 0.1–1.5)
BUN SERPL-MCNC: 17 MG/DL (ref 8–22)
CALCIUM SERPL-MCNC: 9.6 MG/DL (ref 8.5–10.5)
CHLORIDE SERPL-SCNC: 103 MMOL/L (ref 96–112)
CO2 SERPL-SCNC: 22 MMOL/L (ref 20–33)
CREAT SERPL-MCNC: 0.9 MG/DL (ref 0.5–1.4)
EKG IMPRESSION: NORMAL
ERYTHROCYTE [DISTWIDTH] IN BLOOD BY AUTOMATED COUNT: 42.9 FL (ref 35.9–50)
GFR SERPLBLD CREATININE-BSD FMLA CKD-EPI: 101 ML/MIN/1.73 M 2
GLOBULIN SER CALC-MCNC: 2.9 G/DL (ref 1.9–3.5)
GLUCOSE SERPL-MCNC: 114 MG/DL (ref 65–99)
HCT VFR BLD AUTO: 41.4 % (ref 42–52)
HGB BLD-MCNC: 14.8 G/DL (ref 14–18)
MCH RBC QN AUTO: 30.6 PG (ref 27–33)
MCHC RBC AUTO-ENTMCNC: 35.7 G/DL (ref 33.7–35.3)
MCV RBC AUTO: 85.7 FL (ref 81.4–97.8)
PLATELET # BLD AUTO: 179 K/UL (ref 164–446)
PMV BLD AUTO: 11.1 FL (ref 9–12.9)
POTASSIUM SERPL-SCNC: 4.2 MMOL/L (ref 3.6–5.5)
PROT SERPL-MCNC: 7.8 G/DL (ref 6–8.2)
RBC # BLD AUTO: 4.83 M/UL (ref 4.7–6.1)
SODIUM SERPL-SCNC: 137 MMOL/L (ref 135–145)
WBC # BLD AUTO: 3.6 K/UL (ref 4.8–10.8)

## 2022-11-15 PROCEDURE — 85730 THROMBOPLASTIN TIME PARTIAL: CPT

## 2022-11-15 PROCEDURE — 85027 COMPLETE CBC AUTOMATED: CPT

## 2022-11-15 PROCEDURE — 80053 COMPREHEN METABOLIC PANEL: CPT

## 2022-11-15 PROCEDURE — 85610 PROTHROMBIN TIME: CPT

## 2022-11-15 PROCEDURE — 36415 COLL VENOUS BLD VENIPUNCTURE: CPT

## 2022-11-15 PROCEDURE — 93005 ELECTROCARDIOGRAM TRACING: CPT

## 2022-11-15 PROCEDURE — 93010 ELECTROCARDIOGRAM REPORT: CPT | Performed by: INTERNAL MEDICINE

## 2022-11-15 ASSESSMENT — FIBROSIS 4 INDEX: FIB4 SCORE: 2.99

## 2022-11-16 ENCOUNTER — APPOINTMENT (OUTPATIENT)
Dept: CARDIOLOGY | Facility: MEDICAL CENTER | Age: 52
End: 2022-11-16
Attending: NURSE PRACTITIONER
Payer: COMMERCIAL

## 2022-11-16 ENCOUNTER — HOSPITAL ENCOUNTER (OUTPATIENT)
Facility: MEDICAL CENTER | Age: 52
End: 2022-11-16
Attending: INTERNAL MEDICINE | Admitting: INTERNAL MEDICINE
Payer: COMMERCIAL

## 2022-11-16 VITALS
SYSTOLIC BLOOD PRESSURE: 103 MMHG | RESPIRATION RATE: 17 BRPM | DIASTOLIC BLOOD PRESSURE: 67 MMHG | HEIGHT: 68 IN | HEART RATE: 80 BPM | WEIGHT: 200.84 LBS | TEMPERATURE: 98 F | BODY MASS INDEX: 30.44 KG/M2 | OXYGEN SATURATION: 97 %

## 2022-11-16 DIAGNOSIS — I25.10 CORONARY ARTERY DISEASE INVOLVING NATIVE CORONARY ARTERY OF NATIVE HEART WITHOUT ANGINA PECTORIS: ICD-10-CM

## 2022-11-16 LAB
ACT BLD: 271 SEC (ref 74–137)
ACT BLD: 323 SEC (ref 74–137)
APTT PPP: 27.5 SEC (ref 24.7–36)
EKG IMPRESSION: NORMAL
GLUCOSE BLD STRIP.AUTO-MCNC: 121 MG/DL (ref 65–99)
INR PPP: 1.07 (ref 0.87–1.13)
PROTHROMBIN TIME: 13.7 SEC (ref 12–14.6)

## 2022-11-16 PROCEDURE — 93010 ELECTROCARDIOGRAM REPORT: CPT | Mod: 59 | Performed by: STUDENT IN AN ORGANIZED HEALTH CARE EDUCATION/TRAINING PROGRAM

## 2022-11-16 PROCEDURE — 700117 HCHG RX CONTRAST REV CODE 255: Performed by: INTERNAL MEDICINE

## 2022-11-16 PROCEDURE — 92978 ENDOLUMINL IVUS OCT C 1ST: CPT | Mod: 26,LD | Performed by: INTERNAL MEDICINE

## 2022-11-16 PROCEDURE — 92928 PRQ TCAT PLMT NTRAC ST 1 LES: CPT | Mod: LD | Performed by: INTERNAL MEDICINE

## 2022-11-16 PROCEDURE — 92921 PR PRQ TRLUML CORONARY ANGIOPLASTY ADDL BRANCH: CPT | Mod: LD | Performed by: INTERNAL MEDICINE

## 2022-11-16 PROCEDURE — 85347 COAGULATION TIME ACTIVATED: CPT

## 2022-11-16 PROCEDURE — 93005 ELECTROCARDIOGRAM TRACING: CPT | Performed by: INTERNAL MEDICINE

## 2022-11-16 PROCEDURE — 700111 HCHG RX REV CODE 636 W/ 250 OVERRIDE (IP)

## 2022-11-16 PROCEDURE — 700105 HCHG RX REV CODE 258: Performed by: INTERNAL MEDICINE

## 2022-11-16 PROCEDURE — 82962 GLUCOSE BLOOD TEST: CPT

## 2022-11-16 PROCEDURE — 160046 HCHG PACU - 1ST 60 MINS PHASE II

## 2022-11-16 PROCEDURE — 160002 HCHG RECOVERY MINUTES (STAT)

## 2022-11-16 PROCEDURE — 700101 HCHG RX REV CODE 250

## 2022-11-16 PROCEDURE — 160036 HCHG PACU - EA ADDL 30 MINS PHASE I

## 2022-11-16 PROCEDURE — 99152 MOD SED SAME PHYS/QHP 5/>YRS: CPT | Performed by: INTERNAL MEDICINE

## 2022-11-16 PROCEDURE — C1769 GUIDE WIRE: HCPCS

## 2022-11-16 PROCEDURE — 160035 HCHG PACU - 1ST 60 MINS PHASE I

## 2022-11-16 RX ORDER — LIDOCAINE HYDROCHLORIDE 20 MG/ML
INJECTION, SOLUTION INFILTRATION; PERINEURAL
Status: COMPLETED
Start: 2022-11-16 | End: 2022-11-16

## 2022-11-16 RX ORDER — MIDAZOLAM HYDROCHLORIDE 1 MG/ML
INJECTION INTRAMUSCULAR; INTRAVENOUS
Status: COMPLETED
Start: 2022-11-16 | End: 2022-11-16

## 2022-11-16 RX ORDER — HEPARIN SODIUM 1000 [USP'U]/ML
INJECTION, SOLUTION INTRAVENOUS; SUBCUTANEOUS
Status: COMPLETED
Start: 2022-11-16 | End: 2022-11-16

## 2022-11-16 RX ORDER — VERAPAMIL HYDROCHLORIDE 2.5 MG/ML
INJECTION, SOLUTION INTRAVENOUS
Status: COMPLETED
Start: 2022-11-16 | End: 2022-11-16

## 2022-11-16 RX ORDER — SODIUM CHLORIDE 9 MG/ML
1000 INJECTION, SOLUTION INTRAVENOUS
Status: COMPLETED | OUTPATIENT
Start: 2022-11-16 | End: 2022-11-16

## 2022-11-16 RX ORDER — HEPARIN SODIUM 200 [USP'U]/100ML
INJECTION, SOLUTION INTRAVENOUS
Status: COMPLETED
Start: 2022-11-16 | End: 2022-11-16

## 2022-11-16 RX ADMIN — MIDAZOLAM 1 MG: 1 INJECTION, SOLUTION INTRAMUSCULAR; INTRAVENOUS at 14:04

## 2022-11-16 RX ADMIN — IOHEXOL 76 ML: 350 INJECTION, SOLUTION INTRAVENOUS at 14:11

## 2022-11-16 RX ADMIN — SODIUM CHLORIDE 1000 ML: 9 INJECTION, SOLUTION INTRAVENOUS at 10:20

## 2022-11-16 RX ADMIN — NITROGLYCERIN 10 ML: 20 INJECTION INTRAVENOUS at 13:15

## 2022-11-16 RX ADMIN — FENTANYL CITRATE 100 MCG: 50 INJECTION, SOLUTION INTRAMUSCULAR; INTRAVENOUS at 13:27

## 2022-11-16 RX ADMIN — VERAPAMIL HYDROCHLORIDE 2.5 MG: 2.5 INJECTION, SOLUTION INTRAVENOUS at 13:15

## 2022-11-16 RX ADMIN — LIDOCAINE HYDROCHLORIDE: 20 INJECTION, SOLUTION INFILTRATION; PERINEURAL at 13:15

## 2022-11-16 RX ADMIN — MIDAZOLAM 2 MG: 1 INJECTION, SOLUTION INTRAMUSCULAR; INTRAVENOUS at 13:27

## 2022-11-16 RX ADMIN — HEPARIN SODIUM 2000 UNITS: 200 INJECTION, SOLUTION INTRAVENOUS at 13:15

## 2022-11-16 RX ADMIN — HEPARIN SODIUM: 1000 INJECTION, SOLUTION INTRAVENOUS; SUBCUTANEOUS at 13:45

## 2022-11-16 RX ADMIN — HEPARIN SODIUM: 1000 INJECTION, SOLUTION INTRAVENOUS; SUBCUTANEOUS at 13:15

## 2022-11-16 RX ADMIN — FENTANYL CITRATE 50 MCG: 50 INJECTION, SOLUTION INTRAMUSCULAR; INTRAVENOUS at 14:04

## 2022-11-16 ASSESSMENT — FIBROSIS 4 INDEX: FIB4 SCORE: 1.42

## 2022-11-16 NOTE — PROCEDURES
Cardiac Catheterization Procedure Note    DATE: 11/16/2022    : Juan Rodriguez MD    PROCEDURES PERFORMED:  Left heart catheterization  Limited coronary angiography  Intravascular ultrasound of the left anterior descending coronary artery  Percutaneous transluminal coronary angioplasty of the ostial first diagonal branch  Percutaneous coronary intervention to the proximal to mid left anterior descending coronary artery  Moderate conscious sedation    INDICATIONS:  The patient is a 52-year-old gentleman referred for staged percutaneous coronary intervention to the left anterior descending coronary for complete revascularization following inferior STEMI.    CONSENT:  The complete alternatives, risks, and benefits of the procedure were explained to the patient. Signed informed consent was obtained and placed in the chart prior to the procedure.  A timeout was performed prior to beginning the procedure.    MEDICATIONS:  Lidocaine  Fentanyl  Midazolam  Nitroglycerin  Verapamil  Heparin    MODERATE CONSCIOUS SEDATION:  I personally supervised the administration of moderate conscious sedation by the nursing staff for 55 minutes.  Sedation start time: 1:14 PM  Sedation end time: 0 9 PM    CONTRAST: Omnipaque 76 cc    ACCESS: 6-Mongolian Glidesheath in the right radial artery.    ESTIMATED BLOOD LOSS: 20 cc    COMPLICATIONS: None    PROCEDURE IN DETAIL:  The patient was brought to the cardiac catheterization laboratory in the fasting state.  The skin over the right wrist was prepped and draped in the usual sterile fashion. Lidocaine infiltration was used to anesthetize the tissue over the right radial artery.  Using the micropuncture technique, a 6-Mongolian Glidesheath was inserted in the right radial artery.  A 6-Mongolian EBU-3.5 guide catheter was then advanced over a standard J-wire into the aortic root.  This catheter was used to engage the ostium of the left main coronary artery and planning cineangiograms were then  "obtained.  A 0.014\" Prowater wire was then advanced into the distal aspect of the first diagonal branch and a 0.014\" BMW wire was advanced into the distal aspect of the left anterior descending coronary artery.  An [place] IVUS catheter was then advanced over the BMW wire and IVUS was performed.  The distal reference vessel had mild concentric plaque with a true vessel diameter of approximately 3.5 mm.  The lesion itself consisted of fibrotic, negatively remodeled plaque resulting in severe luminal stenosis.  The proximal reference had mild concentric plaque extending all the way to the ostium of the LAD with a true vessel diameter of approximately 3.9 mm.  Following intravascular ultrasound, we proceeded with percutaneous coronary intervention.    A 2.5 x 12 mm compliant balloon was then advanced over the Prowater wire and was used to perform percutaneous transluminal coronary angioplasty on the ostial first diagonal branch at a maximum pressure of 12 johnny for 60 seconds.  This balloon was then used to predilate the mid LAD at a maximum pressure of 8 johnny.  Following predilation, a 3.0 x 34 mm [place] drug-eluting stent was advanced over the guidewire and was deployed across the lesion at a maximum pressure of 16 johnny.  After deployment, the diagonal branch was rewired with a 0.014\" Run-through wire and the ostium and further PTCA was performed with a fresh 2.5 x 12 mm compliant balloon up to maximum pressure of 16 johnny for 60 seconds.  The LAD stent was then postdilated with a 3.5 x 20 mm noncompliant balloon at a maximum pressure of 20 johnny.  Final cineangiograms were then obtained in orthogonal views, which demonstrated excellent stent expansion and apposition with no evidence of wire perforation, thrombus or dissection.  The EBU catheter was then exchanged for a 6-Sinhala JR-4 diagnostic catheter, which was advanced over a J-wire into the left ventricular cavity where it was gently aspirated, flushed, and withdrawn " across the aortic valve with sequential pressures measured.  This catheter was then used engage the ostium of the right coronary and 1 cineangiograms obtained, which demonstrated widely patent stents.  At the completion of the case, all wires, catheters, and sheaths were removed. A TR band was placed using the patent hemostasis technique.    HEMODYNAMICS:   Aortic pressure: 106/62 mmHg  Pre A-wave pressure: 3 mmHg  No significant aortic gradient on pullback    LIMITED CORONARY ANGIOGRAPHY:  Limited coronary angiography of the left coronary system redemonstrated mid 70% left anterior descending coronary artery disease extending into the bifurcation of the first diagonal branch as well as a small diffusely diseased ramus intermedius/high first obtuse marginal branch.  Limited coronary angiography of the right coronary system demonstrated widely patent proximal to mid and distal stents.    INTRAVASCULAR ULTRASOUND:  See IVUS findings and procedure details above.    PERCUTANEOUS TRANSLUMINAL CORONARY ANGIOGRAPHY:  Lesion location: Ostial first diagonal branch  Lesion type: A  Lesion length: 5 mm  Preintervention DAHIANA flow: 3  Postintervention DAHIANA flow: 3  Preintervention stenosis: 70%  Postintervention stenosis: 20%  PTCA balloon: 2.5 x 12 mm compliant balloon    PERCUTANEOUS CORONARY INTERVENTION:  Lesion location: Proximal to mid left anterior descending coronary  Lesion type: C  Lesion length: 30 mm  Pre-intervention DAHIANA flow: 3  Post-intervention DAHIANA flow: 3  Pre-intervention stenosis: 70%  Post-intervention stenosis: 0%  Stent: 4.0 x 34 mm John drug-eluting stent    IMPRESSION:  Successful percutaneous coronary intervention to the proximal to mid left anterior descending coronary with successful percutaneous transluminal coronary angioplasty of the ostial first diagonal branch.  Widely patent right coronary artery stents.  Low left heart filling pressures.    RECOMMENDATIONS:  Recover in post procedure area with  plan for same-day discharge if clinically stable in 4 hours.  TR band release per protocol.  Continue dual antiplatelet therapy with aspirin and prasugrel for at least 6-12 months if tolerated.  Continue optimal medical management of residual coronary artery disease.    NOTIFICATION:  The patient's family was called and notified of the results of his cardiac catheterization.

## 2022-11-16 NOTE — OR NURSING
1424- Pt arrives to PACU from cath lab on room air. Report received. Denies pain and nausea, TR band in place.     1429- EKG at bedside.     1506- Pt wife Radha called and updated on pt status and POC.     1735- TR band removed, no bleeding or hematoma noted.     1815- Pt discharged home. Pt verbalizes understanding of instructions. Dressing CDI. PIV removed.

## 2022-11-16 NOTE — DISCHARGE INSTRUCTIONS
HOME CARE INSTRUCTIONS    ACTIVITY: Rest and take it easy for the first 24 hours.  A responsible adult is recommended to remain with you during that time.  It is normal to feel sleepy.  We encourage you to not do anything that requires balance, judgment or coordination.    FOR 24 HOURS DO NOT:  Drive, operate machinery or run household appliances.  Drink beer or alcoholic beverages.  Make important decisions or sign legal documents.    SPECIAL INSTRUCTIONS:    POST ANGIOGRAM  General Care Instructions  Maintain a bandage over the incision site for 24 hours.  It's normal to find a small bruise or dime-sized lump at the insertion site. This should disappear within a few weeks.  Do not apply lotions or powders to the site.  Do not immerse the catheter insertion site in water (bathtub/swimming) for five days. It is ok to shower 24 hours after the procedure.  You may resume your normal diet immediately; on the day of your procedure, drink 6-10 glasses of water to help flush the contrast liquid out of your system.  If the doctor inserted the catheter in through your groin:  Walking short distances on a flat surface is OK. Limit going up/down stairs for the first 2 days.  DO NOT do yard work, drive, squat, lift heavy objects, or play sports for 2 days; or until your health care provider tells you it is OK.  If the doctor inserted the catheter in your arm:  For 24 hours, DO NOT lift anything heavier than 10 pounds (approximately a gallon of milk). DO NOT do any heavy pushing, pulling, or twisting.    Medications  If your current medications need to be changed, you will be provided with an updated list of your medications prior to discharge.  If you take warfarin (Coumadin), resume taking your usual dose the evening after the procedure.  DO NOT STOP taking prescribed blood thinning (anti-platelet) medications unless instructed by your cardiologist.  These medications include:  Aspirin, Clopidogrel (Plavix), Ticagrelor  (Brilinta), or Prasugrel (Effient)   If you take one of the following anticoagulants, RESUME 24 HOURS after your procedure:  Apixiban (Eliquis), Rivaroxaban (Xarelto), Dabigatran (Pradaxa), Edoxaban (Savaysa)  If you take metformin (Glucophage), RESUME 48 HOURS after your procedure.    When to call your healthcare provider  Call your cardiologist right away at [phone] if you have any of the following:   Problems/Concerns taking any of your prescribed heart medicines.   The insertion site has increasing pain, swelling, redness, bleeding, or drainage.   Your arm or leg below where the insertion site changes color, is cool, or is numb.   You have chest pain or shortness of breath that does not go away with rest.   Your pulse feels irregular -- very slow (less than 60 beats/minute) or very fast (over 100 beats/minute).   You have dizziness, fainting, or you are very tired.   You are coughing up blood or yellow or green mucus.   You have chills or a fever over 101°F (38.3°C).    If there is bleeding at the catheter insertion site, apply pressure for 10 minutes.  If bleeding persists, call 911, and continue to hold pressure until advanced medical support arrives.    DIET: To avoid nausea, slowly advance diet as tolerated, avoiding spicy or greasy foods for the first day.  Add more substantial food to your diet according to your physician's instructions.  Babies can be fed formula or breast milk as soon as they are hungry.  INCREASE FLUIDS AND FIBER TO AVOID CONSTIPATION.    SURGICAL DRESSING/BATHIN hours do not submerge in water until cleared by your provider.     MEDICATIONS: Resume taking daily medication.  Take prescribed pain medication with food.  If no medication is prescribed, you may take non-aspirin pain medication if needed.  PAIN MEDICATION CAN BE VERY CONSTIPATING.  Take a stool softener or laxative such as senokot, pericolace, or milk of magnesia if needed.    A follow-up appointment should be  arranged with your doctor in 1-2 weeks; call to schedule.    You should CALL YOUR PHYSICIAN if you develop:  Fever greater than 101 degrees F.  Pain not relieved by medication, or persistent nausea or vomiting.  Excessive bleeding (blood soaking through dressing) or unexpected drainage from the wound.  Extreme redness or swelling around the incision site, drainage of pus or foul smelling drainage.  Inability to urinate or empty your bladder within 8 hours.  Problems with breathing or chest pain.    You should call 911 if you develop problems with breathing or chest pain.  If you are unable to contact your doctor or surgical center, you should go to the nearest emergency room or urgent care center.  Physician's telephone #: [phone]    MILD FLU-LIKE SYMPTOMS ARE NORMAL.  YOU MAY EXPERIENCE GENERALIZED MUSCLE ACHES, THROAT IRRITATION, HEADACHE AND/OR SOME NAUSEA.    If any questions arise, call your doctor.  If your doctor is not available, please feel free to call the Surgical Center at [phone].  The Center is open Monday through Friday from 7AM to 7PM.      A registered nurse may call you a few days after your surgery to see how you are doing after your procedure.    You may also receive a survey in the mail within the next two weeks and we ask that you take a few moments to complete the survey and return it to us.  Our goal is to provide you with very good care and we value your comments.     Depression / Suicide Risk    As you are discharged from this [facility] facility, it is important to learn how to keep safe from harming yourself.    Recognize the warning signs:  Abrupt changes in personality, positive or negative- including increase in energy   Giving away possessions  Change in eating patterns- significant weight changes-  positive or negative  Change in sleeping patterns- unable to sleep or sleeping all the time   Unwillingness or inability to communicate  Depression  Unusual sadness,  discouragement and loneliness  Talk of wanting to die  Neglect of personal appearance   Rebelliousness- reckless behavior  Withdrawal from people/activities they love  Confusion- inability to concentrate     If you or a loved one observes any of these behaviors or has concerns about self-harm, here's what you can do:  Talk about it- your feelings and reasons for harming yourself  Remove any means that you might use to hurt yourself (examples: pills, rope, extension cords, firearm)  Get professional help from the community (Mental Health, Substance Abuse, psychological counseling)  Do not be alone:Call your Safe Contact- someone whom you trust who will be there for you.  Call your local CRISIS HOTLINE 126-2776 or 845-916-1997  Call your local Children's Mobile Crisis Response Team [place] (528) 664-4166 or www.CallAround  Call the toll free National Suicide Prevention Hotlines   National Suicide Prevention Lifeline 988-917-DKFG (2847)  National Hope Line Network 800-SUICIDE (709-9214)    I acknowledge receipt and understanding of these Home Care instructions.

## 2022-11-18 ENCOUNTER — OFFICE VISIT (OUTPATIENT)
Dept: MEDICAL GROUP | Facility: MEDICAL CENTER | Age: 52
End: 2022-11-18
Payer: COMMERCIAL

## 2022-11-18 VITALS
OXYGEN SATURATION: 100 % | HEART RATE: 92 BPM | SYSTOLIC BLOOD PRESSURE: 138 MMHG | BODY MASS INDEX: 30.77 KG/M2 | RESPIRATION RATE: 16 BRPM | HEIGHT: 68 IN | TEMPERATURE: 98 F | WEIGHT: 203 LBS | DIASTOLIC BLOOD PRESSURE: 72 MMHG

## 2022-11-18 DIAGNOSIS — E11.40 TYPE 2 DIABETES MELLITUS WITH DIABETIC NEUROPATHY, WITHOUT LONG-TERM CURRENT USE OF INSULIN (HCC): ICD-10-CM

## 2022-11-18 DIAGNOSIS — E78.5 DYSLIPIDEMIA: ICD-10-CM

## 2022-11-18 DIAGNOSIS — I21.11 STEMI INVOLVING RIGHT CORONARY ARTERY (HCC): ICD-10-CM

## 2022-11-18 DIAGNOSIS — I25.10 CORONARY ARTERY DISEASE INVOLVING NATIVE CORONARY ARTERY OF NATIVE HEART WITHOUT ANGINA PECTORIS: ICD-10-CM

## 2022-11-18 PROCEDURE — 99214 OFFICE O/P EST MOD 30 MIN: CPT | Performed by: STUDENT IN AN ORGANIZED HEALTH CARE EDUCATION/TRAINING PROGRAM

## 2022-11-18 ASSESSMENT — FIBROSIS 4 INDEX: FIB4 SCORE: 1.42

## 2022-11-18 NOTE — PROGRESS NOTES
Subjective:     CC: CAD follow up    HPI:   José presents today for CAD follow up    Problem   Coronary Artery Disease Without Angina Pectoris    Chronic condition. He had recent STEMI s/p stenting on 10/5/2022.  Current regimen: aspirin 81mg daily, prasugrel 10mg daily, carvedilol 6.25mg BID, lisinopril 5mg daily, atorvastatin 80mg daily  He reports compliance and/or tolerance and symptoms controlled with current medication(s). Does not need medication(s) refill. No acute concerns.     Dyslipidemia    Recent condition. Recent initial lipid panel with TG in the 1600s which improved to the 400s after started on fenofibrate.    Current regimen: atorvastatin 80mg daily, fenofibrate 67mg daily     Type 2 Diabetes Mellitus With Diabetic Neuropathy, Without Long-Term Current Use of Insulin (Hcc)    New condition. Newly diagnosed diabetes with A1C 8.0. Since last visit, medications were adjusted and he has been taking as instructed and tolerating well. He has since weaned off insulin therapy and feels well. Average fasting blood sugar 130-150s.     Current regimen: metformin 1000mg BID, Januvia 100mg daily, gabapentin 300mg qhs     Bmi 30.0-30.9,Adult    Chronic condition. He eats fair diet in general. He does not regularly exercise.     Stemi Involving Right Coronary Artery (Hcc)    Recent condition. He had recent STEMI s/p stenting on 10/5/2022. Since last visit, he had additional procedures including percutaneous transluminal coronary angioplasty of the ostial first diagonal branch and percutaneous coronary intervention to the proximal to mid left anterior descending coronary artery on 11/16/2022. Since going home, he has been feeling well. He has been taking his medications as instructed and tolerating well. Denies chest pain, shortness of breath.    Current regimen: aspirin 81mg daily, prasugrel 10mg daily, carvedilol 6.25mg BID, lisinopril 5mg daily, atorvastatin 80mg daily     Per hospital note:  PCI found severe  obstructive three vessel disease, but acutely significant for  proximal occluding thrombus in the RCA. Two stents were placed in the proximal RCA and the posterior descending coronary artery. Patient complained of mild chest pain the day following PCI which resolved with nitroglycerin; patient has an appointment with cardiology in 4 weeks to assess need for a staged stent of LAD. Patient was discharged on Aspirin 81mg, prasugrel 10mg, and with blood pressure management with lisinopril 5mg and coreg 6.25mg BID.         Current Outpatient Medications Ordered in Epic   Medication Sig Dispense Refill    lisinopril (PRINIVIL) 5 MG Tab Take 1 Tablet by mouth every day. 90 Tablet 3    atorvastatin (LIPITOR) 80 MG tablet Take 1 Tablet by mouth every evening. 90 Tablet 3    carvedilol (COREG) 6.25 MG Tab Take 1 Tablet by mouth 2 times a day with meals. 60 Tablet 2    fenofibrate micronized (LOFIBRA) 67 MG capsule Take 1 Capsule by mouth every day for 90 days. 90 Capsule 3    metFORMIN (GLUCOPHAGE) 500 MG Tab Take 2 Tablets by mouth 2 times a day with meals for 90 days. 360 Tablet 3    prasugrel (EFFIENT) 10 MG Tab Take 1 Tablet by mouth every day for 90 days. 90 Tablet 3    SITagliptin (JANUVIA) 100 MG Tab Take 1 Tablet by mouth every day for 90 days. 90 Tablet 3    aspirin 81 MG EC tablet Take 1 Tablet by mouth every day. 30 Tablet 2     No current Epic-ordered facility-administered medications on file.     Social history  Living situation: originally from [place]  Occupation: works as   Marital status:   Alcohol/tobacco/illicit drugs: smokes 1ppd since age 12, just quit, rare EtOH, denies illicit drugs  Diet/Exercise: Eats fair diet in general. No regular exercise.     ROS:   Gen: no fevers/chills  Pulm: no sob, no cough  CV: no chest pain, no palpitations  GI: no nausea/vomiting, no diarrhea  MSk: no myalgias  Skin: no rash  Neuro: no headaches, no numbness/tingling  Heme/Lymph: no easy  "bruising    Objective:     Exam:  /72 (BP Location: Left arm, Patient Position: Sitting, BP Cuff Size: Adult)   Pulse 92   Temp 36.7 °C (98 °F) (Temporal)   Resp 16   Ht 1.727 m (5' 8\")   Wt 92.1 kg (203 lb)   SpO2 100%   BMI 30.87 kg/m²  Body mass index is 30.87 kg/m².    General: Normal appearing. No distress.  Pulmonary: Clear to ausculation. Normal effort. No rales, ronchi, or wheezing.  Cardiovascular: Regular rate and rhythm without murmur. Carotid and radial pulses are intact and equal bilaterally.  Neurologic: Grossly nonfocal  Musculoskeletal: Normal gait. No extremity cyanosis, clubbing, or edema.  Psych: Normal mood and affect. Alert and oriented x3. Judgment and insight is normal.    Labs: no new lab results since last visit    Assessment & Plan:     52 y.o. male with the following -     1. Coronary artery disease involving native coronary artery of native heart without angina pectoris  Recent diagnosed condition since 10/2022, stable. Followed by cardiology.  - cont current regimen: aspirin 81mg daily, prasugrel 10mg daily, carvedilol 6.25mg BID, lisinopril 5mg daily, atorvastatin 80mg daily  - plan for continuing DAPT for 6-12 months until 10/2023    2. STEMI involving right coronary artery (HCC)  - plan as above  - Lipid Profile; Future    3. Type 2 diabetes mellitus with diabetic neuropathy, without long-term current use of insulin (HCC)  Recent condition 10/2022, needs improvement. Most recent A1C 8.0.  - cont current medications: metformin 1000mg BID, Januvia 100mg daily  - increase gabapentin to 600mg or 900mg depending on response and/or tolerability  - Discussed daily self foot exam, eye care, and regular exercise with patient  - Patient instructed to follow a low carbohydrate diet  - Follow up in 3 months or earlier as needed  - Comp Metabolic Panel; Future  - HEMOGLOBIN A1C; Future    4. Dyslipidemia  Chronic condition, stable.  - cont current regimen: atorvastatin 80mg daily, " fenofibrate 67mg daily  - Lipid Profile; Future    Return in about 3 months (around 2/18/2023) for chronic medical conditions.    Please note that this dictation was created using voice recognition software. I have made every reasonable attempt to correct obvious errors, but I expect that there are errors of grammar and possibly content that I did not discover before finalizing the note.

## 2022-11-22 DIAGNOSIS — E11.40 TYPE 2 DIABETES MELLITUS WITH DIABETIC NEUROPATHY, WITHOUT LONG-TERM CURRENT USE OF INSULIN (HCC): ICD-10-CM

## 2022-11-22 RX ORDER — GABAPENTIN 300 MG/1
600 CAPSULE ORAL NIGHTLY
Qty: 180 CAPSULE | Refills: 3 | Status: SHIPPED | OUTPATIENT
Start: 2022-11-22 | End: 2023-02-20

## 2023-02-10 ENCOUNTER — OFFICE VISIT (OUTPATIENT)
Dept: CARDIOLOGY | Facility: MEDICAL CENTER | Age: 53
End: 2023-02-10
Payer: COMMERCIAL

## 2023-02-10 VITALS
BODY MASS INDEX: 31.07 KG/M2 | DIASTOLIC BLOOD PRESSURE: 72 MMHG | SYSTOLIC BLOOD PRESSURE: 108 MMHG | HEIGHT: 68 IN | OXYGEN SATURATION: 99 % | RESPIRATION RATE: 18 BRPM | WEIGHT: 205 LBS | HEART RATE: 78 BPM

## 2023-02-10 DIAGNOSIS — I21.11 STEMI INVOLVING RIGHT CORONARY ARTERY (HCC): ICD-10-CM

## 2023-02-10 DIAGNOSIS — E78.1 HIGH BLOOD TRIGLYCERIDES: ICD-10-CM

## 2023-02-10 DIAGNOSIS — Z72.0 TOBACCO ABUSE: ICD-10-CM

## 2023-02-10 DIAGNOSIS — E78.5 DYSLIPIDEMIA: ICD-10-CM

## 2023-02-10 DIAGNOSIS — I25.10 CORONARY ARTERY DISEASE INVOLVING NATIVE CORONARY ARTERY OF NATIVE HEART WITHOUT ANGINA PECTORIS: ICD-10-CM

## 2023-02-10 DIAGNOSIS — E11.40 TYPE 2 DIABETES MELLITUS WITH DIABETIC NEUROPATHY, WITHOUT LONG-TERM CURRENT USE OF INSULIN (HCC): ICD-10-CM

## 2023-02-10 PROCEDURE — 99214 OFFICE O/P EST MOD 30 MIN: CPT | Performed by: NURSE PRACTITIONER

## 2023-02-10 RX ORDER — FENOFIBRATE 67 MG/1
67 CAPSULE ORAL DAILY
COMMUNITY
Start: 2023-01-25 | End: 2023-02-17 | Stop reason: SDUPTHER

## 2023-02-10 RX ORDER — SITAGLIPTIN 100 MG/1
100 TABLET, FILM COATED ORAL DAILY
COMMUNITY
Start: 2023-01-14 | End: 2023-10-05

## 2023-02-10 RX ORDER — PRASUGREL 10 MG/1
10 TABLET, FILM COATED ORAL DAILY
Qty: 90 TABLET | Refills: 2 | Status: SHIPPED | OUTPATIENT
Start: 2023-02-10 | End: 2023-12-04 | Stop reason: SDUPTHER

## 2023-02-10 RX ORDER — PRASUGREL 10 MG/1
10 TABLET, FILM COATED ORAL DAILY
COMMUNITY
Start: 2023-01-14 | End: 2023-02-10 | Stop reason: SDUPTHER

## 2023-02-10 ASSESSMENT — FIBROSIS 4 INDEX: FIB4 SCORE: 1.42

## 2023-02-11 NOTE — PROGRESS NOTES
"      Cardiology Clinic Follow-up Note    Date of note:    2/10/2023  Primary Care Provider: Dustin Terry D.O.    Name:             Jace Mohr  YOB: 1970  MRN:               [id number]    CC: s/p STEMI    Primary Cardiologist: Dr Stanton (inpatient consultation)    Patient HPI:   Jace Mohr is a 52 y.o. male with current medical problems including DM2, CAD, chronic tobacco abuse, with recent STEMI on 10/3/2022 in which he received 2 MARIAJOSE's to proximal-mid RCA and distal RCA.    Interim History  Presents for 3 months F/U post STEMI  With staged PCI of prox-mid LAD on 11/16/2022  Taking Dog walking 2-3 miles.  Still smoking on a rare occasion with .   He denies palpitations, chest pain, shortness of breath, dyspnea on exertion, dizziness or syncopal episodes, orthopnea, PND, lower extremity swelling, and recent weight gain.  Denies blood in urine or stool.  Not yet got his lipid panel rechecked, orders pending per PCP.    Per my last office visit note on 11/3/2022  Mr. Mohr was admitted to our [facility] for chest pain and positive STEMI on 10/3/2022, taken emergently to Cath Lab, there was an acute thrombotic occlusion present in the proximal RCA in which received a MARIAJOSE, second MARIAJOSE placed in the distal RCA into the PDA, 70% stenosis of the mid LAD, recommended staged PCI per Dr. Rodriguez.    Was feeling angina for months prior to MI.     Today patient presents feeling \"great\". He denies palpitations, chest pain, shortness of breath, dyspnea on exertion, dizziness or syncopal episodes, orthopnea, PND, lower extremity swelling, and recent weight gain.     Walking dogs 3 x day. He is a , wont be able to able to participate in cardiac rehab. He has drastically decreased his amount of cigarettes 1 every 3 days. This has been extremely hard.     Had DOT physical, they state he needs a stress test to go back to work.    Patient endorses medication compliance.    Cardiovascular Risk " Factors:  1. Smoking status: Yes, currently   2. Type II Diabetes Mellitus: Yes   Lab Results   Component Value Date/Time    HBA1C 8.0 (H) 10/03/2022 06:45 AM     3. Hypertension: Yes  4. Dyslipidemia: Yes   Cholesterol,Tot   Date Value Ref Range Status   10/04/2022 176 100 - 199 mg/dL Final     LDL   Date Value Ref Range Status   10/04/2022 see below <100 mg/dL Final     Comment:     The calculated LDL value is invalid due to the triglyceride  value of >400 mg/dL.       HDL   Date Value Ref Range Status   10/04/2022 see below >=40 mg/dL Final     Comment:     Unable to obtain HDL result due to extremely lipemic sample with  triglyceride value >1200 mg/dL.       Triglycerides   Date Value Ref Range Status   10/04/2022 1624 (H) 0 - 149 mg/dL Final     Comment:     Results obtained by dilution.     5. Family history of early Coronary Artery Disease in a first degree relative (Male less than 55 years of age; Female less than 65 years of age): No  6.  Obesity and/or Metabolic Syndrome: BMI 30  7. Sedentary lifestyle: yes,     Review of systems:  All others systems reviewed and negative except for what is outlined in the above HPI    Past Medical History:   Diagnosis Date    Breath shortness     Cancer (HCC) 2012    skin    Dental disorder 11/15/2022    partial upper    Diabetes (HCC)     Myocardial infarct (HCC) 10/03/2022    with stents placed     Past Surgical History:   Procedure Laterality Date    OTHER ORTHOPEDIC SURGERY  2014    ACL right knee    OTHER  2012    skin cancer     Family History   Problem Relation Age of Onset    Heart Disease Maternal Grandmother     Diabetes Maternal Grandmother      Social History     Socioeconomic History    Marital status:      Spouse name: Not on file    Number of children: Not on file    Years of education: Not on file    Highest education level: Not on file   Occupational History    Not on file   Tobacco Use    Smoking status: Some Days     Packs/day: 1.00  "    Years: 40.00     Pack years: 40.00     Types: Cigarettes    Smokeless tobacco: Former   Vaping Use    Vaping Use: Never used   Substance and Sexual Activity    Alcohol use: Yes     Comment: occ. q 6 mo?    Drug use: Never    Sexual activity: Not on file   Other Topics Concern    Not on file   Social History Narrative    Not on file     Social Determinants of Health     Financial Resource Strain: Not on file   Food Insecurity: Not on file   Transportation Needs: Not on file   Physical Activity: Not on file   Stress: Not on file   Social Connections: Not on file   Intimate Partner Violence: Not on file   Housing Stability: Not on file     No Known Allergies  Current Outpatient Medications   Medication Sig Dispense Refill    metFORMIN (GLUCOPHAGE) 500 MG Tab Take 1,000 mg by mouth 2 times a day with meals.      JANUVIA 100 MG Tab Take 100 mg by mouth every day. FOR 90 DAYS      fenofibrate micronized (LOFIBRA) 67 MG capsule Take 67 mg by mouth every day. FOR 90 DAYS      prasugrel (EFFIENT) 10 MG Tab Take 1 Tablet by mouth every day. 90 Tablet 2    gabapentin (NEURONTIN) 300 MG Cap Take 2 Capsules by mouth every evening for 90 days. 180 Capsule 3    lisinopril (PRINIVIL) 5 MG Tab Take 1 Tablet by mouth every day. 90 Tablet 3    atorvastatin (LIPITOR) 80 MG tablet Take 1 Tablet by mouth every evening. 90 Tablet 3    carvedilol (COREG) 6.25 MG Tab Take 1 Tablet by mouth 2 times a day with meals. 60 Tablet 2    aspirin 81 MG EC tablet Take 1 Tablet by mouth every day. 30 Tablet 2     No current facility-administered medications for this visit.     Physical Exam:  Ambulatory Vitals  /72 (BP Location: Left arm, Patient Position: Sitting, BP Cuff Size: Adult)   Pulse 78   Resp 18   Ht 1.727 m (5' 8\")   Wt 93 kg (205 lb)   SpO2 99%    BP Readings from Last 4 Encounters:   02/10/23 108/72   11/18/22 138/72   11/16/22 103/67   11/03/22 134/86       Weight/BMI: Body mass index is 31.17 kg/m².  Wt Readings from " Last 4 Encounters:   02/10/23 93 kg (205 lb)   11/18/22 92.1 kg (203 lb)   11/16/22 91.1 kg (200 lb 13.4 oz)   11/03/22 92.5 kg (204 lb)     General: No apparent distress. Well nourished.   Neck: No JVD. No caroid bruits, trachea midline  Lungs: CTAB. Normal effort, without crackles/rhonchi, no wheezing  Heart: RRR. Normal S1/S2, no murmur, no rub. no lower extremity edema. 2+ radial pulses, 2+ DT pulses  Ext: No clubbing or cyanosis.  Abdomen: soft, non tender, non distended, no ladi hepatomegaly.  Neurological: No focal deficits, no facial asymmetry.  Normal speech.  Psychiatric: Appropriate affect, alert and oriented x 4.   Skin: Warm and dry, no rash.    Lab Data Review:  Lab Results   Component Value Date/Time    CHOLSTRLTOT 109 11/09/2022 12:27 PM    LDL see below 11/09/2022 12:27 PM    HDL 23 (A) 11/09/2022 12:27 PM    TRIGLYCERIDE 412 (H) 11/09/2022 12:27 PM       Lab Results   Component Value Date/Time    SODIUM 137 11/15/2022 04:08 PM    POTASSIUM 4.2 11/15/2022 04:08 PM    CHLORIDE 103 11/15/2022 04:08 PM    CO2 22 11/15/2022 04:08 PM    GLUCOSE 114 (H) 11/15/2022 04:08 PM    BUN 17 11/15/2022 04:08 PM    CREATININE 0.90 11/15/2022 04:08 PM     Lab Results   Component Value Date/Time    ALKPHOSPHAT 73 11/15/2022 04:08 PM    ASTSGOT 23 11/15/2022 04:08 PM    ALTSGPT 24 11/15/2022 04:08 PM    TBILIRUBIN 1.0 11/15/2022 04:08 PM      Lab Results   Component Value Date/Time    WBC 3.6 (L) 11/15/2022 04:08 PM     Cardiac Imaging and Procedures Review:      EKG 10/3/22: My Personal interpretation reveals SR 79, residual mild ST depression in anterior leads, placed STEMI    Echo 10/3/22:  CONCLUSIONS  No prior study is available for comparison.   Normal left ventricle size and systolic function.  Left ventricular ejection fraction estimated to be 60%.  Normal right ventricular size and systolic function.  No hemodynamically significant valvular heart disease noted.     [facility] 10/3/22:  IMPRESSION:  Severe  obstructive three-vessel coronary artery disease involving the left anterior descending, ramus intermedius, and right coronary arteries.  Acute thrombotic occlusion of the proximal right coronary artery successfully treated with one 3.0 x 48 mm Synergy drug-eluting stent.  Successful percutaneous coronary intervention to the distal right coronary artery into the posterior descending coronary artery with one 2.5 x 24 mm Synergy drug-eluting stent.  Preserved left ventricular systolic function.  Normal left heart filling pressures.     CORONARY ANGIOGRAPHY:  The left main coronary artery is patent and trifurcates into the left anterior descending, ramus intermedius, and left circumflex coronary arteries.  The left anterior descending coronary artery is a large, transapical vessel with proximal 20% disease, mid 70% disease at the takeoff of a large first diagonal branch, and distal luminal irregularities.  It supplies a large first diagonal branch with ostial 70% disease.  The left circumflex coronary artery is a large, nondominant vessel with mid 30% disease.  It supplies one significant obtuse marginal branch with luminal irregularities.  The right coronary artery is a moderate, dominant vessel that is proximally occluded with thrombus.  Following intervention the vessel was seen to have a mid to distal 30% lesion and then a distal 70% lesion just prior to the crux.  Down the crux, the vessel bifurcates into a moderate posterior descending coronary and a moderate posterolateral branch with luminal irregularities.     RECOMMENDATIONS:  Observation in IMCU.  TR band release per protocol.  Dual antiplatelet therapy with aspirin and prasugrel for least 6-12 months if tolerated.  Optimal medical management of acute myocardial infarction and residual coronary artery disease.  Echocardiogram.  Referral to cardiac rehabilitation on discharge.  Evaluation as outpatient for staged percutaneous coronary intervention to the left  anterior descending coronary.    [facility] 22  IMPRESSION:  Successful percutaneous coronary intervention to the proximal to mid left anterior descending coronary with successful percutaneous transluminal coronary angioplasty of the ostial first diagonal branch.  Widely patent right coronary artery stents.  Low left heart filling pressures.     RECOMMENDATIONS:  Recover in post procedure area with plan for same-day discharge if clinically stable in 4 hours.  TR band release per protocol.  Continue dual antiplatelet therapy with aspirin and prasugrel for at least 6-12 months if tolerated.  Continue optimal medical management of residual coronary artery disease.    Assessment and Clinical Decision Makin. Coronary artery disease involving native coronary artery of native heart without angina pectoris  prasugrel (EFFIENT) 10 MG Tab      2. Hx STEMI involving right coronary artery (HCC)        3. Type 2 diabetes mellitus with diabetic neuropathy, without long-term current use of insulin ([facility])        4. Dyslipidemia        5. High blood triglycerides        6. Tobacco abuse            The following treatment plan was discussed    CAD, Hx STEMI, s/p MARIAJOSE/PCI to RCA x2  Staged PCI to mid-proximal LAD  Guideline directed medical therapy to include   - Continue DAPT x 1 year  - Continue aspirin 81 mg daily  - Continue Effient 10 mg daily (year will be 2023)  - Continue carvedilol 6.25bid   - High intensity statin, 80 mg atorvastatin daily  - Continue lisinopril 5 mg daily    DM2  -Treatment per PCP  -Hemoglobin A1c was 8 in the hospital, goal < 7  -For DM2 and CAD recommend SGLT2i for synergistic diuretic effect, diabetes control and attenuation of myocardial oxidative stress and fibrosis, discuss with PCP    Dyslipidemia   hypertriglyceridemia  -Continue atorvastatin 80 mg lipid panel in 1 month  -Still needs to follow-up with his lipid panel  -Discussed TLC    Tobacco use  -I spent 5 minutes discussing the need for  smoking/tobacco cessation. We discussed measures for quitting including replacements such as nicotine gum/nicotine patch and I have encouraged further management with PCP.  -Talk to your primary care provider about nicotine supplements such as gum or patches.  -Talk to your your primary care provider about pharmacological assistance such as Wellbutrin or Chantix.  -In regards to tobacco dependence, discussed harmful cardiovascular effects including accelerated atherosclerosis, risk factor for stroke, progressive coronary artery disease and recurrent heart attacks      Plan reviewed in detail with the patient, verbalizes understanding and is in agreement.  Pt is to follow up with myself in 6 months   Encouraged Pt to follow up with us over the phone or electronically using my [employer] as cardiac issues/concerns arise.      PLEASE NOTE: This dictation was created using voice recognition software. I have made every reasonable attempt to correct obvious errors, but I expect that there are errors of grammar and possibly content that I did not discover before finalizing the note.       BRANT Vicente.   [facility]  [phone]    Collaborating Physician:Dr. Aguilera

## 2023-02-11 NOTE — PATIENT INSTRUCTIONS
Please get cholesterol panel rechecked ASAP    FOR TREATMENT OF BAD CHOLESTEROL/FATS  REDUCE PROCESSED SUGAR AS MUCH AS POSSIBLE  INCREASE WHOLE GRAINS/VEGETABLES  INCREASE FIBER    Lowering total cholesterol and LDL (bad) cholesterol:  - Eat leaner cuts of meat, or eliminate altogether if possible red meat, and frequently substitute fish or chicken.  - Limit saturated fat to no more than 7-10% of total calories no more than 10 g per day is recommended. Some sources of saturated fat include butter, animal fats, hydrogenated vegetable fats and oils, many desserts, whole milk dairy products.  - Replaced saturated fats with polyunsaturated fats and monounsaturated fats. Foods high in monounsaturated fat include nuts, canola oil, avocados, and olives.  - Limit trans fat (processed foods) and replaced with fresh fruits and vegetables  - Recommend nonfat dairy products  - Increase substantially the amount of soluble fiber intake (legumes such as beans, fruit, whole grains).  - Consider nutritional supplements: plant sterile spreads such as Benecol, fish oil,  flaxseed oil, omega-3 acids capsules 1000 mg twice a day, or viscous fiber such as Metamucil  - Attain ideal weight and regular exercise (at least 30 minutes per day of moderate exercise)  ASK ABOUT STATIN OR NON STATIN MEDICATION TO REDUCE YOUR LDL AND HEART RISK    Lowering triglycerides:  - Reduce intake of simple sugar: Desserts, candy, pastries, honey, sodas, sugared cereals, yogurt, Gatorade, sports bars, canned fruit, smoothies, fruit juice, coffee drinks  - Reduced intake of refined starches: Refined Pasta, most bread  - Reduce or abstain from alcohol  - Increase omega-3 fatty acids: [place], Trout, Mackerel, [place], [place] tuna and supplements  - Attain ideal weight and regular exercise (at least 30 minutes per day of moderate exercise)  ASK ABOUT PURIFIED OMEGA 3 EPA or FISH OIL TO REDUCE YOUR TG AND HEART RISK    Elevating HDL (good) cholesterol:  -  Increase physical activity  - Increase omega-3 fatty acids and supplements as listed above  - Incorporating appropriate amounts of monounsaturated fats such as nuts, olive oil, canola oil, avocados, olives  - Stop smoking  - Attain ideal weight and regular exercise (at least 30 minutes per day of moderate exercise)

## 2023-02-16 ENCOUNTER — HOSPITAL ENCOUNTER (OUTPATIENT)
Dept: LAB | Facility: MEDICAL CENTER | Age: 53
End: 2023-02-16
Attending: STUDENT IN AN ORGANIZED HEALTH CARE EDUCATION/TRAINING PROGRAM
Payer: COMMERCIAL

## 2023-02-16 DIAGNOSIS — E78.5 DYSLIPIDEMIA: ICD-10-CM

## 2023-02-16 DIAGNOSIS — I21.11 STEMI INVOLVING RIGHT CORONARY ARTERY (HCC): ICD-10-CM

## 2023-02-16 DIAGNOSIS — E11.40 TYPE 2 DIABETES MELLITUS WITH DIABETIC NEUROPATHY, WITHOUT LONG-TERM CURRENT USE OF INSULIN (HCC): ICD-10-CM

## 2023-02-16 LAB
ALBUMIN SERPL BCP-MCNC: 4.5 G/DL (ref 3.2–4.9)
ALBUMIN/GLOB SERPL: 1.5 G/DL
ALP SERPL-CCNC: 77 U/L (ref 30–99)
ALT SERPL-CCNC: 23 U/L (ref 2–50)
ANION GAP SERPL CALC-SCNC: 12 MMOL/L (ref 7–16)
AST SERPL-CCNC: 24 U/L (ref 12–45)
BILIRUB SERPL-MCNC: 0.7 MG/DL (ref 0.1–1.5)
BUN SERPL-MCNC: 17 MG/DL (ref 8–22)
CALCIUM ALBUM COR SERPL-MCNC: 9.2 MG/DL (ref 8.5–10.5)
CALCIUM SERPL-MCNC: 9.6 MG/DL (ref 8.4–10.2)
CHLORIDE SERPL-SCNC: 105 MMOL/L (ref 96–112)
CHOLEST SERPL-MCNC: 104 MG/DL (ref 100–199)
CO2 SERPL-SCNC: 21 MMOL/L (ref 20–33)
CREAT SERPL-MCNC: 0.95 MG/DL (ref 0.5–1.4)
EST. AVERAGE GLUCOSE BLD GHB EST-MCNC: 111 MG/DL
FASTING STATUS PATIENT QL REPORTED: NORMAL
GFR SERPLBLD CREATININE-BSD FMLA CKD-EPI: 95 ML/MIN/1.73 M 2
GLOBULIN SER CALC-MCNC: 3.1 G/DL (ref 1.9–3.5)
GLUCOSE SERPL-MCNC: 106 MG/DL (ref 65–99)
HBA1C MFR BLD: 5.5 % (ref 4–5.6)
HDLC SERPL-MCNC: 21 MG/DL
LDLC SERPL CALC-MCNC: 5 MG/DL
POTASSIUM SERPL-SCNC: 4.5 MMOL/L (ref 3.6–5.5)
PROT SERPL-MCNC: 7.6 G/DL (ref 6–8.2)
SODIUM SERPL-SCNC: 138 MMOL/L (ref 135–145)
TRIGL SERPL-MCNC: 388 MG/DL (ref 0–149)

## 2023-02-16 PROCEDURE — 83036 HEMOGLOBIN GLYCOSYLATED A1C: CPT

## 2023-02-16 PROCEDURE — 80061 LIPID PANEL: CPT

## 2023-02-16 PROCEDURE — 36415 COLL VENOUS BLD VENIPUNCTURE: CPT

## 2023-02-16 PROCEDURE — 80053 COMPREHEN METABOLIC PANEL: CPT

## 2023-02-17 ENCOUNTER — OFFICE VISIT (OUTPATIENT)
Dept: MEDICAL GROUP | Facility: MEDICAL CENTER | Age: 53
End: 2023-02-17
Payer: COMMERCIAL

## 2023-02-17 VITALS
HEART RATE: 77 BPM | BODY MASS INDEX: 29.86 KG/M2 | DIASTOLIC BLOOD PRESSURE: 62 MMHG | HEIGHT: 68 IN | SYSTOLIC BLOOD PRESSURE: 130 MMHG | TEMPERATURE: 98.2 F | RESPIRATION RATE: 16 BRPM | OXYGEN SATURATION: 100 % | WEIGHT: 197 LBS

## 2023-02-17 DIAGNOSIS — E11.40 TYPE 2 DIABETES MELLITUS WITH DIABETIC NEUROPATHY, WITHOUT LONG-TERM CURRENT USE OF INSULIN (HCC): ICD-10-CM

## 2023-02-17 DIAGNOSIS — E78.5 DYSLIPIDEMIA: ICD-10-CM

## 2023-02-17 DIAGNOSIS — Z00.00 PREVENTATIVE HEALTH CARE: ICD-10-CM

## 2023-02-17 DIAGNOSIS — F17.219 CIGARETTE NICOTINE DEPENDENCE WITH NICOTINE-INDUCED DISORDER: ICD-10-CM

## 2023-02-17 DIAGNOSIS — I25.10 CORONARY ARTERY DISEASE INVOLVING NATIVE CORONARY ARTERY OF NATIVE HEART WITHOUT ANGINA PECTORIS: ICD-10-CM

## 2023-02-17 PROCEDURE — 99406 BEHAV CHNG SMOKING 3-10 MIN: CPT | Performed by: STUDENT IN AN ORGANIZED HEALTH CARE EDUCATION/TRAINING PROGRAM

## 2023-02-17 PROCEDURE — 99214 OFFICE O/P EST MOD 30 MIN: CPT | Mod: 25 | Performed by: STUDENT IN AN ORGANIZED HEALTH CARE EDUCATION/TRAINING PROGRAM

## 2023-02-17 RX ORDER — FENOFIBRATE 134 MG/1
134 CAPSULE ORAL DAILY
Qty: 90 CAPSULE | Refills: 1 | Status: SHIPPED | OUTPATIENT
Start: 2023-02-17 | End: 2023-08-07

## 2023-02-17 ASSESSMENT — FIBROSIS 4 INDEX: FIB4 SCORE: 1.51

## 2023-02-17 ASSESSMENT — PATIENT HEALTH QUESTIONNAIRE - PHQ9: CLINICAL INTERPRETATION OF PHQ2 SCORE: 0

## 2023-02-17 NOTE — PROGRESS NOTES
Subjective:     CC: chronic conditions follow up    HPI:   José presents today for chronic conditions follow up    Problem   Coronary Artery Disease Without Angina Pectoris    Chronic condition. He had recent STEMI s/p stenting on 10/5/2022.  Current regimen: aspirin 81mg daily, prasugrel 10mg daily, carvedilol 6.25mg BID, lisinopril 5mg daily, atorvastatin 80mg daily  He reports compliance and/or tolerance and symptoms controlled with current medication(s). Does not need medication(s) refill. No acute concerns.     Dyslipidemia    Chronic condition. Recent initial lipid panel with TG in the 1600s which improved to the 400s after started on fenofibrate.    Current regimen: atorvastatin 80mg daily, fenofibrate 67mg daily     Type 2 Diabetes Mellitus With Diabetic Neuropathy, Without Long-Term Current Use of Insulin (Hcc)    Chronic condition since 10/2022 when A1C was 8.0. Since last visit, medications were adjusted and he has been taking as instructed and tolerating well. He has since weaned off insulin therapy and feels well. Average fasting blood sugar 120s.     Patient reports suboptimal improvement with gabapentin 300mg qhs and would like to trial increase in dose. Denies drowsiness or somnolence with taking gabapentin so far.    Current regimen: metformin 1000mg BID, Januvia 100mg daily, gabapentin 300mg qhs     Preventative Health Care    Patient is due for preventative labs.     Nicotine Dependence    Chronic condition.  He has been smoking 1ppd since age 12 but cut down to 1-2 cigarettes every 2-3 weeks since his recent STEMI.         Current Outpatient Medications Ordered in Epic   Medication Sig Dispense Refill    fenofibrate micronized (LOFIBRA) 134 MG capsule Take 1 Capsule by mouth every day for 90 days. FOR 90 DAYS 90 Capsule 1    metFORMIN (GLUCOPHAGE) 500 MG Tab Take 1,000 mg by mouth 2 times a day with meals.      JANUVIA 100 MG Tab Take 100 mg by mouth every day. FOR 90 DAYS      prasugrel  "(EFFIENT) 10 MG Tab Take 1 Tablet by mouth every day. 90 Tablet 2    gabapentin (NEURONTIN) 300 MG Cap Take 2 Capsules by mouth every evening for 90 days. 180 Capsule 3    lisinopril (PRINIVIL) 5 MG Tab Take 1 Tablet by mouth every day. 90 Tablet 3    atorvastatin (LIPITOR) 80 MG tablet Take 1 Tablet by mouth every evening. 90 Tablet 3    carvedilol (COREG) 6.25 MG Tab Take 1 Tablet by mouth 2 times a day with meals. 60 Tablet 2    aspirin 81 MG EC tablet Take 1 Tablet by mouth every day. 30 Tablet 2     No current Epic-ordered facility-administered medications on file.       Health Maintenance: reviewed  Vaccines: Pfizer COVID #2 received 05/2021  Colonoscopy never done    Social history  Living situation: originally from [place]  Occupation: works as   Marital status:   Alcohol/tobacco/illicit drugs: smokes 1ppd since age 12, just quit, rare EtOH, denies illicit drugs  Diet/Exercise: Eats fair diet in general. No regular exercise.     ROS:   Gen: no fevers/chills  Pulm: no sob, no cough  CV: no chest pain, no palpitations  GI: no nausea/vomiting, no diarrhea  MSk: no myalgias  Skin: no rash  Neuro: no headaches, no numbness/tingling  Heme/Lymph: no easy bruising    Objective:     Exam:  /62 (BP Location: Left arm, Patient Position: Sitting, BP Cuff Size: Adult)   Pulse 77   Temp 36.8 °C (98.2 °F) (Temporal)   Resp 16   Ht 1.727 m (5' 8\")   Wt 89.4 kg (197 lb)   SpO2 100%   BMI 29.95 kg/m²  Body mass index is 29.95 kg/m².    General: Normal appearing. No distress.  Pulmonary: Clear to ausculation. Normal effort. No rales, ronchi, or wheezing.  Cardiovascular: Regular rate and rhythm without murmur. Carotid and radial pulses are intact and equal bilaterally.  Neurologic: Grossly nonfocal  Musculoskeletal: Normal gait. No extremity cyanosis, clubbing, or edema.  Psych: Normal mood and affect. Alert and oriented x3. Judgment and insight is normal.    Labs:   Lab Results   Component " Value Date/Time    SODIUM 138 02/16/2023 10:20 AM    POTASSIUM 4.5 02/16/2023 10:20 AM    CHLORIDE 105 02/16/2023 10:20 AM    CO2 21 02/16/2023 10:20 AM    ANION 12.0 02/16/2023 10:20 AM    GLUCOSE 106 (H) 02/16/2023 10:20 AM    BUN 17 02/16/2023 10:20 AM    CREATININE 0.95 02/16/2023 10:20 AM    CALCIUM 9.6 02/16/2023 10:20 AM    ASTSGOT 24 02/16/2023 10:20 AM    ALTSGPT 23 02/16/2023 10:20 AM    TBILIRUBIN 0.7 02/16/2023 10:20 AM    ALBUMIN 4.5 02/16/2023 10:20 AM    TOTPROTEIN 7.6 02/16/2023 10:20 AM    GLOBULIN 3.1 02/16/2023 10:20 AM    AGRATIO 1.5 02/16/2023 10:20 AM       Lab Results   Component Value Date/Time    HBA1C 5.5 02/16/2023 10:20 AM      Lab Results   Component Value Date/Time    CHOLSTRLTOT 104 02/16/2023 1020    TRIGLYCERIDE 388 (H) 02/16/2023 1020    HDL 21 (A) 02/16/2023 1020    LDL 5 02/16/2023 1020       Assessment & Plan:     52 y.o. male with the following -     1. Type 2 diabetes mellitus with diabetic neuropathy, without long-term current use of insulin (HCC)  Chronic condition, controlled with medications. Most recent A1C 5.5.  - cont current medications: metformin 1000mg BID, Januvia 100mg daily, gabapentin 600mg qhs  - Discussed daily self foot exam, eye care, and regular exercise with patient  - Patient instructed to follow a low carbohydrate diet  - Follow up in 3 months or earlier as needed  - HEMOGLOBIN A1C; Future    2. Dyslipidemia  Chronic condition, stable. Triglyceride level improved but still elevated.  - cont current regimen: atorvastatin 80mg daily  - increase fenofibrate to 134mg daily  - fenofibrate micronized (LOFIBRA) 134 MG capsule; Take 1 Capsule by mouth every day for 90 days. FOR 90 DAYS  Dispense: 90 Capsule; Refill: 1  - Lipid Profile; Future    3. Coronary artery disease involving native coronary artery of native heart without angina pectoris  Chronic condition since 10/2022, stable. Followed by cardiology.  - cont current regimen: aspirin 81mg daily, prasugrel  10mg daily, carvedilol 6.25mg BID, lisinopril 5mg daily, atorvastatin 80mg daily  - plan for continuing prasugrel for until 11/2023  - Lipid Profile; Future  - HEMOGLOBIN A1C; Future    4. Cigarette nicotine dependence with nicotine-induced disorder  Chronic condition, stable. Has cut down a lot on cigarette use.  - education and brief counseling on tobacco cessation provided (time spent 5 minutes), pt was encouraged to quit smoking, cessation methods/medications were recommended and/or discussed   - encouraged to continue tobacco cessation or limit use    5. Preventative health care  - HEP C VIRUS ANTIBODY; Future  - HIV AG/AB COMBO ASSAY SCREENING; Future  - HEP B SURFACE AB; Future      Return in about 6 months (around 8/17/2023) for chronic medical conditions.    Please note that this dictation was created using voice recognition software. I have made every reasonable attempt to correct obvious errors, but I expect that there are errors of grammar and possibly content that I did not discover before finalizing the note.

## 2023-03-06 NOTE — PROGRESS NOTES
Diabetes education: Met with pt and wife this afternoon, before discharge. Diabetes education was completed to include , what diabetes was, difference between type 1 and type 2, hyperglycemia, hypoglycemia, insulin, finger sticks, foot care, need for PCP follow up ( CM obtained a PCP appointment ), need for protein and controlled carbohydrates with every meal and benefit of hs snack.  Pt practiced with saline pens and practice device.  One touch verio flex meter from pharmacy and One touch verio reflect meter set up and reviewed.  Questions answered.   Odomzo Counseling- I discussed with the patient the risks of Odomzo including but not limited to nausea, vomiting, diarrhea, constipation, weight loss, changes in the sense of taste, decreased appetite, muscle spasms, and hair loss.  The patient verbalized understanding of the proper use and possible adverse effects of Odomzo.  All of the patient's questions and concerns were addressed.

## 2023-05-10 DIAGNOSIS — I25.10 CORONARY ARTERY DISEASE INVOLVING NATIVE CORONARY ARTERY OF NATIVE HEART WITHOUT ANGINA PECTORIS: ICD-10-CM

## 2023-05-10 RX ORDER — PRASUGREL 10 MG/1
10 TABLET, FILM COATED ORAL DAILY
Qty: 90 TABLET | Refills: 2 | OUTPATIENT
Start: 2023-05-10

## 2023-06-20 DIAGNOSIS — E11.40 TYPE 2 DIABETES MELLITUS WITH DIABETIC NEUROPATHY, WITHOUT LONG-TERM CURRENT USE OF INSULIN (HCC): ICD-10-CM

## 2023-06-20 RX ORDER — GABAPENTIN 300 MG/1
1200 CAPSULE ORAL NIGHTLY
Qty: 360 CAPSULE | Refills: 3 | Status: SHIPPED | OUTPATIENT
Start: 2023-06-20 | End: 2024-02-15

## 2023-07-24 ENCOUNTER — TELEPHONE (OUTPATIENT)
Dept: CARDIOLOGY | Facility: MEDICAL CENTER | Age: 53
End: 2023-07-24
Payer: COMMERCIAL

## 2023-07-24 NOTE — TELEPHONE ENCOUNTER
LVM 2X for pt to call back and r/s appt. Informed pt appt was forrest. Sent SCHEDit message. /cg 07/24/23

## 2023-08-14 NOTE — PROGRESS NOTES
Chief Complaint   Patient presents with    Coronary Artery Disease     Follow up       Subjective     Jace Mohr is a 53 y.o. male who presents today for 6-month follow-up.    Patient has a medical history significant for T2DM, CAD, chronic tobacco abuse, recent STEMI in October 2022 s/p staged MARIAJOSE x2 to proximal mid RCA and distal RCA 10/3/2020 to 11/16/2022.  Patient followed by Dr. Stanton, last seen in the office by Ethel CHAVEZ on 2/10/2023.    Today in the office patient has no cardiac complaints, no chest pain, no shortness of breath.  Is tolerating all his medications well.  Still works as a  he continues to smoke about a half a pack a day  (down from 1.5 packs a day).  Does report that he walks 2 to 3 miles every day.      Past Medical History:   Diagnosis Date    Breath shortness     Cancer (HCC) 2012    skin    Dental disorder 11/15/2022    partial upper    Diabetes (HCC)     Myocardial infarct (HCC) 10/03/2022    with stents placed     Past Surgical History:   Procedure Laterality Date    OTHER ORTHOPEDIC SURGERY  2014    ACL right knee    OTHER  2012    skin cancer     Family History   Problem Relation Age of Onset    Heart Disease Maternal Grandmother     Diabetes Maternal Grandmother      Social History     Socioeconomic History    Marital status:      Spouse name: Not on file    Number of children: Not on file    Years of education: Not on file    Highest education level: Not on file   Occupational History    Not on file   Tobacco Use    Smoking status: Some Days     Packs/day: 1.00     Years: 40.00     Additional pack years: 0.00     Total pack years: 40.00     Types: Cigarettes    Smokeless tobacco: Former   Vaping Use    Vaping Use: Never used   Substance and Sexual Activity    Alcohol use: Yes     Comment: occ. q 6 mo?    Drug use: Never    Sexual activity: Not on file   Other Topics Concern    Not on file   Social History Narrative    Not on file     Social Determinants of  "Health     Financial Resource Strain: Not on file   Food Insecurity: Not on file   Transportation Needs: Not on file   Physical Activity: Not on file   Stress: Not on file   Social Connections: Not on file   Intimate Partner Violence: Not on file   Housing Stability: Not on file     No Known Allergies  Outpatient Encounter Medications as of 8/16/2023   Medication Sig Dispense Refill    carvedilol (COREG) 12.5 MG Tab Take 1 Tablet by mouth 2 times a day with meals. 90 Tablet 3    Fenofibrate 134 MG Cap Take 1 capsule by mouth once daily for 90 days 90 Capsule 3    gabapentin (NEURONTIN) 300 MG Cap Take 4 Capsules by mouth every evening for 360 days. 360 Capsule 3    metFORMIN (GLUCOPHAGE) 500 MG Tab Take 1,000 mg by mouth 2 times a day with meals.      JANUVIA 100 MG Tab Take 100 mg by mouth every day. FOR 90 DAYS      prasugrel (EFFIENT) 10 MG Tab Take 1 Tablet by mouth every day. 90 Tablet 2    lisinopril (PRINIVIL) 5 MG Tab Take 1 Tablet by mouth every day. 90 Tablet 3    atorvastatin (LIPITOR) 80 MG tablet Take 1 Tablet by mouth every evening. 90 Tablet 3    aspirin 81 MG EC tablet Take 1 Tablet by mouth every day. 30 Tablet 2    [DISCONTINUED] carvedilol (COREG) 6.25 MG Tab Take 6.25 mg by mouth 2 times a day with meals.       No facility-administered encounter medications on file as of 8/16/2023.     Review of Systems   Respiratory:  Negative for shortness of breath.    Cardiovascular:  Negative for chest pain, palpitations, orthopnea and leg swelling.   Neurological:  Negative for dizziness and loss of consciousness.   All other systems reviewed and are negative.             Objective     BP (!) 140/84 (BP Location: Left arm, Patient Position: Sitting)   Pulse 72   Resp 16   Ht 1.727 m (5' 8\")   Wt 92.5 kg (204 lb)   SpO2 100%   BMI 31.02 kg/m²     Physical Exam  Vitals reviewed.   Constitutional:       General: He is not in acute distress.     Appearance: He is normal weight.   Cardiovascular:      Rate " and Rhythm: Normal rate and regular rhythm.      Heart sounds: No murmur heard.  Pulmonary:      Effort: Pulmonary effort is normal. No respiratory distress.      Breath sounds: Normal breath sounds. No rhonchi.   Abdominal:      General: There is no distension.      Tenderness: There is no abdominal tenderness.   Musculoskeletal:      Cervical back: Normal range of motion.      Right lower leg: No edema.      Left lower leg: No edema.   Neurological:      General: No focal deficit present.      Mental Status: He is alert.            Lab Results   Component Value Date/Time    CHOLSTRLTOT 119 08/15/2023 04:27 PM    LDL see below 08/15/2023 04:27 PM    HDL 20 (A) 08/15/2023 04:27 PM    TRIGLYCERIDE 422 (H) 08/15/2023 04:27 PM       Lab Results   Component Value Date/Time    SODIUM 138 02/16/2023 10:20 AM    POTASSIUM 4.5 02/16/2023 10:20 AM    CHLORIDE 105 02/16/2023 10:20 AM    CO2 21 02/16/2023 10:20 AM    GLUCOSE 106 (H) 02/16/2023 10:20 AM    BUN 17 02/16/2023 10:20 AM    CREATININE 0.95 02/16/2023 10:20 AM     Lab Results   Component Value Date/Time    ALKPHOSPHAT 77 02/16/2023 10:20 AM    ASTSGOT 24 02/16/2023 10:20 AM    ALTSGPT 23 02/16/2023 10:20 AM    TBILIRUBIN 0.7 02/16/2023 10:20 AM      Echo 10/3/22:  CONCLUSIONS  No prior study is available for comparison.   Normal left ventricle size and systolic function.  Left ventricular ejection fraction estimated to be 60%.  Normal right ventricular size and systolic function.  No hemodynamically significant valvular heart disease noted.     [facility] 10/3/22:  IMPRESSION:  Severe obstructive three-vessel coronary artery disease involving the left anterior descending, ramus intermedius, and right coronary arteries.  Acute thrombotic occlusion of the proximal right coronary artery successfully treated with one 3.0 x 48 mm Synergy drug-eluting stent.  Successful percutaneous coronary intervention to the distal right coronary artery into the posterior descending coronary  artery with one 2.5 x 24 mm Synergy drug-eluting stent.  Preserved left ventricular systolic function.  Normal left heart filling pressures.     CORONARY ANGIOGRAPHY:  The left main coronary artery is patent and trifurcates into the left anterior descending, ramus intermedius, and left circumflex coronary arteries.  The left anterior descending coronary artery is a large, transapical vessel with proximal 20% disease, mid 70% disease at the takeoff of a large first diagonal branch, and distal luminal irregularities.  It supplies a large first diagonal branch with ostial 70% disease.  The left circumflex coronary artery is a large, nondominant vessel with mid 30% disease.  It supplies one significant obtuse marginal branch with luminal irregularities.  The right coronary artery is a moderate, dominant vessel that is proximally occluded with thrombus.  Following intervention the vessel was seen to have a mid to distal 30% lesion and then a distal 70% lesion just prior to the crux.  Down the crux, the vessel bifurcates into a moderate posterior descending coronary and a moderate posterolateral branch with luminal irregularities.       Assessment & Plan     1. Coronary artery disease involving native coronary artery of native heart without angina pectoris        2. Hx STEMI involving right coronary artery (HCC)  carvedilol (COREG) 12.5 MG Tab      3. S/P right coronary artery (RCA) stent placement        4. Pulmonary hypertension (HCC)  carvedilol (COREG) 12.5 MG Tab      5. Presence of stent in LAD coronary artery        6. High blood triglycerides        7. Tobacco abuse        8. Type 2 diabetes mellitus with diabetic neuropathy, without long-term current use of insulin (HCC)            Medical Decision Making: Today's Assessment/Status/Plan:          CAD, Hx STEMI, s/p MARIAJOSE/PCI to RCA x2  Staged PCI to mid-proximal LAD  Guideline directed medical therapy to include   - Continue DAPT x 1 year, ASA and Effient, can go  to single agent in November 23   - Will increase carvedilol from 6.25bid to 12.5 mg twice daily  - High intensity statin, 80 mg atorvastatin daily, LDL at goal, triglycerides very high  - Continue lisinopril 5 mg daily     Hypertension  -Blood pressure not well controlled in the office, patient is not checking home  -We will increase his carvedilol 12.5 mg twice daily encourage patient to check blood pressures at home    DM2  -Treatment per PCP  -Hemoglobin A1c was 8 in the hospital, it is now down to 5.3     Dyslipidemia   hypertriglyceridemia  -Continue atorvastatin 80 mg  -Repeat lipid panel shows good control of LDL, LDL is very low, however he does have persistent elevated triglycerides, they are now in the 400s previously up to the 1600s  -Continue fenofibrate and continue to monitor     Tobacco use  -I spent 5 minutes discussing the need for smoking/tobacco cessation. We discussed measures for quitting including replacements such as nicotine gum/nicotine patch and I have encouraged further management with PCP.  -Patient is not interested in medications to assist with tobacco cessation, he continues to cut back  -In regards to tobacco dependence, discussed harmful cardiovascular effects including accelerated atherosclerosis, risk factor for stroke, progressive coronary artery disease and recurrent heart attacks    Follow-up in 6 months or as needed    Kayla Murphy, MSN, APRN  [facility]  [phone]    Please note this dictation was created using voice recognition software.  I have made every reasonable attempt to correct obvious errors, but there may be errors of grammar and possibly content that I did not discover before finalizing the note.

## 2023-08-15 ENCOUNTER — HOSPITAL ENCOUNTER (OUTPATIENT)
Dept: LAB | Facility: MEDICAL CENTER | Age: 53
End: 2023-08-15
Attending: STUDENT IN AN ORGANIZED HEALTH CARE EDUCATION/TRAINING PROGRAM
Payer: COMMERCIAL

## 2023-08-15 DIAGNOSIS — E78.5 DYSLIPIDEMIA: ICD-10-CM

## 2023-08-15 DIAGNOSIS — Z00.00 PREVENTATIVE HEALTH CARE: ICD-10-CM

## 2023-08-15 DIAGNOSIS — I25.10 CORONARY ARTERY DISEASE INVOLVING NATIVE CORONARY ARTERY OF NATIVE HEART WITHOUT ANGINA PECTORIS: ICD-10-CM

## 2023-08-15 DIAGNOSIS — E11.40 TYPE 2 DIABETES MELLITUS WITH DIABETIC NEUROPATHY, WITHOUT LONG-TERM CURRENT USE OF INSULIN (HCC): ICD-10-CM

## 2023-08-15 LAB
CHOLEST SERPL-MCNC: 119 MG/DL (ref 100–199)
EST. AVERAGE GLUCOSE BLD GHB EST-MCNC: 105 MG/DL
FASTING STATUS PATIENT QL REPORTED: NORMAL
HBA1C MFR BLD: 5.3 % (ref 4–5.6)
HDLC SERPL-MCNC: 20 MG/DL
LDLC SERPL CALC-MCNC: ABNORMAL MG/DL
TRIGL SERPL-MCNC: 422 MG/DL (ref 0–149)

## 2023-08-15 PROCEDURE — 87389 HIV-1 AG W/HIV-1&-2 AB AG IA: CPT

## 2023-08-15 PROCEDURE — 86706 HEP B SURFACE ANTIBODY: CPT

## 2023-08-15 PROCEDURE — 36415 COLL VENOUS BLD VENIPUNCTURE: CPT

## 2023-08-15 PROCEDURE — 86803 HEPATITIS C AB TEST: CPT

## 2023-08-15 PROCEDURE — 83036 HEMOGLOBIN GLYCOSYLATED A1C: CPT

## 2023-08-15 PROCEDURE — 80061 LIPID PANEL: CPT

## 2023-08-15 NOTE — PROGRESS NOTES
Subjective:     CC: Chronic conditions follow up     HPI:   José presents today for chronic conditions follow up     Problem   Coronary Artery Disease Without Angina Pectoris    Chronic condition. He had recent STEMI s/p stenting on 10/5/2022.    Current regimen: aspirin 81mg daily, prasugrel 10mg daily, carvedilol 6.25mg BID, lisinopril 5mg daily, atorvastatin 80mg daily  He reports compliance and/or tolerance and symptoms controlled with current medication(s). Does not need medication(s) refill. No acute concerns.     Bmi 31.0-31.9,Adult    Chronic condition. He eats fair diet in general. He does not regularly exercise.     Nicotine Dependence    Chronic condition.    He has been smoking 1ppd since age 12 but cut down to 1-2 cigarettes every 2-3 weeks since his recent STEMI.         Current Outpatient Medications Ordered in Epic   Medication Sig Dispense Refill    carvedilol (COREG) 12.5 MG Tab Take 1 Tablet by mouth 2 times a day with meals. 90 Tablet 3    Fenofibrate 134 MG Cap Take 1 capsule by mouth once daily for 90 days 90 Capsule 3    gabapentin (NEURONTIN) 300 MG Cap Take 4 Capsules by mouth every evening for 360 days. 360 Capsule 3    metFORMIN (GLUCOPHAGE) 500 MG Tab Take 1,000 mg by mouth 2 times a day with meals.      JANUVIA 100 MG Tab Take 100 mg by mouth every day. FOR 90 DAYS      prasugrel (EFFIENT) 10 MG Tab Take 1 Tablet by mouth every day. 90 Tablet 2    lisinopril (PRINIVIL) 5 MG Tab Take 1 Tablet by mouth every day. 90 Tablet 3    atorvastatin (LIPITOR) 80 MG tablet Take 1 Tablet by mouth every evening. 90 Tablet 3    aspirin 81 MG EC tablet Take 1 Tablet by mouth every day. 30 Tablet 2     No current Epic-ordered facility-administered medications on file.     Social history  Living situation: originally from [place]  Occupation: works as   Marital status:   Alcohol/tobacco/illicit drugs: smokes 1ppd since age 12, just quit, rare EtOH, denies illicit  "drugs  Diet/Exercise: Eats fair diet in general. No regular exercise.    ROS:  See HPI    Objective:     Exam:  /72 (BP Location: Left arm, Patient Position: Sitting, BP Cuff Size: Adult)   Pulse 78   Temp 36.4 °C (97.6 °F) (Temporal)   Ht 1.727 m (5' 8\")   Wt 93.8 kg (206 lb 10.9 oz)   SpO2 99%   BMI 31.43 kg/m²  Body mass index is 31.43 kg/m².    Gen: Alert and oriented, No apparent distress.  Lungs: Normal effort, CTA bilaterally, no wheezes, rhonchi, or rales  CV: Regular rate and rhythm. No murmurs, rubs, or gallops.  Ext: No clubbing, cyanosis, edema.    Labs:   Lab Results   Component Value Date/Time    HBA1C 5.3 08/15/2023 04:27 PM      Lab Results   Component Value Date/Time    CHOLSTRLTOT 119 08/15/2023 1627    TRIGLYCERIDE 422 (H) 08/15/2023 1627    HDL 20 (A) 08/15/2023 1627    LDL see below 08/15/2023 1627       Assessment & Plan:     53 y.o. male with the following -     1. Type 2 diabetes mellitus with diabetic neuropathy, without long-term current use of insulin (HCC)  Chronic condition, controlled with medications. Most recent A1C 5.3.  - cont current medications: metformin 1000mg BID, Januvia 100mg daily, gabapentin 600mg qhs  - Discussed daily self foot exam, eye care, and regular exercise with patient  - Patient instructed to follow a low carbohydrate diet  - Follow up in 6 months or earlier as needed  - Comp Metabolic Panel; Future  - HEMOGLOBIN A1C; Future  - MICROALBUMIN CREAT RATIO URINE; Future    2. Dyslipidemia  Chronic condition, stable.  - cont current regimen: atorvastatin 80mg daily, fenofibrate 134mg daily  - advised to add over the counter omega 3 fish oil 2800mg 2 capsules daily  - Lipid Profile; Future  - TSH WITH REFLEX TO FT4; Future    3. Coronary artery disease involving native coronary artery of native heart without angina pectoris  Chronic condition, stable. Patient saw cardiology today and carvedilol was increased 6.25 to 12.5mg BID. His blood pressure and HR " seem to be fine right now. He will stay on 6.25mg for now and increase to 12.5mg if needed based on his blood pressure and/or heart at home.  - cont current regimen: aspirin 81mg daily, prasugrel 10mg daily (until 11/2023), carvedilol 6.25mg BID, lisinopril 5mg daily, atorvastatin 80mg daily  - HEMOGLOBIN A1C; Future  - Lipid Profile; Future    4. BMI 31.0-31.9,adult  - Patient identified as having weight management issue.  Appropriate orders and counseling given.  - Comp Metabolic Panel; Future  - HEMOGLOBIN A1C; Future  - Lipid Profile; Future  - TSH WITH REFLEX TO FT4; Future    5. Cigarette nicotine dependence with nicotine-induced disorder  Chronic condition, persistent.  - education and brief counseling on tobacco cessation provided (time spent 5 minutes), pt was encouraged to quit smoking, cessation methods/medications were recommended and/or discussed   - CBC WITH DIFFERENTIAL; Future      Return in about 6 months (around 2/16/2024) for chronic medical conditions, establish care.    Patient is aware that I will be leaving [facility] at the end of September and that he will need to establish with a new primary care provider. It has been a real privilege serving as his family doctor.    Please note that this dictation was created using voice recognition software. I have made every reasonable attempt to correct obvious errors, but I expect that there are errors of grammar and possibly content that I did not discover before finalizing the note.

## 2023-08-16 ENCOUNTER — OFFICE VISIT (OUTPATIENT)
Dept: MEDICAL GROUP | Facility: MEDICAL CENTER | Age: 53
End: 2023-08-16
Payer: COMMERCIAL

## 2023-08-16 ENCOUNTER — OFFICE VISIT (OUTPATIENT)
Dept: CARDIOLOGY | Facility: MEDICAL CENTER | Age: 53
End: 2023-08-16
Attending: NURSE PRACTITIONER
Payer: COMMERCIAL

## 2023-08-16 VITALS
TEMPERATURE: 97.6 F | SYSTOLIC BLOOD PRESSURE: 126 MMHG | WEIGHT: 206.68 LBS | DIASTOLIC BLOOD PRESSURE: 72 MMHG | HEART RATE: 78 BPM | BODY MASS INDEX: 31.32 KG/M2 | HEIGHT: 68 IN | OXYGEN SATURATION: 99 %

## 2023-08-16 VITALS
RESPIRATION RATE: 16 BRPM | HEART RATE: 72 BPM | HEIGHT: 68 IN | WEIGHT: 204 LBS | SYSTOLIC BLOOD PRESSURE: 140 MMHG | BODY MASS INDEX: 30.92 KG/M2 | DIASTOLIC BLOOD PRESSURE: 84 MMHG | OXYGEN SATURATION: 100 %

## 2023-08-16 DIAGNOSIS — Z72.0 TOBACCO ABUSE: ICD-10-CM

## 2023-08-16 DIAGNOSIS — Z95.5 PRESENCE OF STENT IN LAD CORONARY ARTERY: ICD-10-CM

## 2023-08-16 DIAGNOSIS — Z95.5 S/P RIGHT CORONARY ARTERY (RCA) STENT PLACEMENT: ICD-10-CM

## 2023-08-16 DIAGNOSIS — I25.10 CORONARY ARTERY DISEASE INVOLVING NATIVE CORONARY ARTERY OF NATIVE HEART WITHOUT ANGINA PECTORIS: ICD-10-CM

## 2023-08-16 DIAGNOSIS — E78.1 HIGH BLOOD TRIGLYCERIDES: ICD-10-CM

## 2023-08-16 DIAGNOSIS — E11.40 TYPE 2 DIABETES MELLITUS WITH DIABETIC NEUROPATHY, WITHOUT LONG-TERM CURRENT USE OF INSULIN (HCC): ICD-10-CM

## 2023-08-16 DIAGNOSIS — I27.20 PULMONARY HYPERTENSION (HCC): ICD-10-CM

## 2023-08-16 DIAGNOSIS — E78.5 DYSLIPIDEMIA: ICD-10-CM

## 2023-08-16 DIAGNOSIS — I21.11 STEMI INVOLVING RIGHT CORONARY ARTERY (HCC): ICD-10-CM

## 2023-08-16 DIAGNOSIS — F17.219 CIGARETTE NICOTINE DEPENDENCE WITH NICOTINE-INDUCED DISORDER: ICD-10-CM

## 2023-08-16 LAB
HBV SURFACE AB SERPL IA-ACNC: <3.5 MIU/ML (ref 0–10)
HCV AB SER QL: NORMAL
HIV 1+2 AB+HIV1 P24 AG SERPL QL IA: NORMAL

## 2023-08-16 PROCEDURE — 99406 BEHAV CHNG SMOKING 3-10 MIN: CPT | Performed by: NURSE PRACTITIONER

## 2023-08-16 PROCEDURE — 99214 OFFICE O/P EST MOD 30 MIN: CPT | Mod: 25 | Performed by: STUDENT IN AN ORGANIZED HEALTH CARE EDUCATION/TRAINING PROGRAM

## 2023-08-16 PROCEDURE — 99406 BEHAV CHNG SMOKING 3-10 MIN: CPT | Performed by: STUDENT IN AN ORGANIZED HEALTH CARE EDUCATION/TRAINING PROGRAM

## 2023-08-16 PROCEDURE — 3074F SYST BP LT 130 MM HG: CPT | Performed by: STUDENT IN AN ORGANIZED HEALTH CARE EDUCATION/TRAINING PROGRAM

## 2023-08-16 PROCEDURE — 99212 OFFICE O/P EST SF 10 MIN: CPT | Performed by: NURSE PRACTITIONER

## 2023-08-16 PROCEDURE — 3077F SYST BP >= 140 MM HG: CPT | Performed by: NURSE PRACTITIONER

## 2023-08-16 PROCEDURE — 3078F DIAST BP <80 MM HG: CPT | Performed by: STUDENT IN AN ORGANIZED HEALTH CARE EDUCATION/TRAINING PROGRAM

## 2023-08-16 PROCEDURE — 3079F DIAST BP 80-89 MM HG: CPT | Performed by: NURSE PRACTITIONER

## 2023-08-16 PROCEDURE — 99214 OFFICE O/P EST MOD 30 MIN: CPT | Mod: 25 | Performed by: NURSE PRACTITIONER

## 2023-08-16 RX ORDER — CARVEDILOL 12.5 MG/1
12.5 TABLET ORAL 2 TIMES DAILY WITH MEALS
Qty: 90 TABLET | Refills: 3 | Status: SHIPPED | OUTPATIENT
Start: 2023-08-16 | End: 2024-02-07 | Stop reason: SDUPTHER

## 2023-08-16 RX ORDER — CARVEDILOL 6.25 MG/1
6.25 TABLET ORAL 2 TIMES DAILY WITH MEALS
COMMUNITY
Start: 2023-07-06 | End: 2023-08-16

## 2023-08-16 ASSESSMENT — ENCOUNTER SYMPTOMS
ORTHOPNEA: 0
LOSS OF CONSCIOUSNESS: 0
PALPITATIONS: 0
SHORTNESS OF BREATH: 0
DIZZINESS: 0

## 2023-08-16 ASSESSMENT — FIBROSIS 4 INDEX
FIB4 SCORE: 1.54
FIB4 SCORE: 1.54

## 2023-08-16 NOTE — PATIENT INSTRUCTIONS
Increase coreg to 12.5 mg twice a day.    Check blood pressures at home, if elevated will contact us.

## 2023-12-02 DIAGNOSIS — E78.5 DYSLIPIDEMIA: ICD-10-CM

## 2023-12-04 DIAGNOSIS — I25.10 CORONARY ARTERY DISEASE INVOLVING NATIVE CORONARY ARTERY OF NATIVE HEART WITHOUT ANGINA PECTORIS: ICD-10-CM

## 2023-12-04 RX ORDER — ATORVASTATIN CALCIUM 80 MG/1
80 TABLET, FILM COATED ORAL EVERY EVENING
Qty: 90 TABLET | Refills: 0 | Status: SHIPPED | OUTPATIENT
Start: 2023-12-04 | End: 2024-02-29

## 2023-12-04 NOTE — TELEPHONE ENCOUNTER
Is the patient due for a refill? Yes    Was the patient seen the past year? Yes    Date of last office visit: 8/16/23    Does the patient have an upcoming appointment?  No    Provider to refill:DB    Does the patients insurance require a 100 day supply?  No

## 2023-12-07 RX ORDER — PRASUGREL 10 MG/1
10 TABLET, FILM COATED ORAL DAILY
Qty: 90 TABLET | Refills: 2 | Status: SHIPPED | OUTPATIENT
Start: 2023-12-07 | End: 2024-03-14 | Stop reason: SDUPTHER

## 2024-02-06 ASSESSMENT — ENCOUNTER SYMPTOMS
ORTHOPNEA: 0
DIZZINESS: 0
PALPITATIONS: 0
SHORTNESS OF BREATH: 0
LOSS OF CONSCIOUSNESS: 0

## 2024-02-06 NOTE — PROGRESS NOTES
Chief Complaint   Patient presents with    Coronary Artery Disease     follow up       Subjective     Jace Mohr is a 53 y.o. male who presents today for 6-month follow-up.    Patient has a medical history significant for T2DM, CAD, chronic tobacco abuse, recent STEMI in October 2022 s/p staged MARIAJOSE x2 to proximal mid RCA and distal RCA 10/3/2020 to 11/16/2022.  Patient followed by Dr. Stanton, last seen in the office by myself on 8/16/2023.  At that time patient was doing well walking 2 to 3 miles every day continue to smoke a half a pack a day.    Today in the office patient is walking frequently, continues to decrease the amount of smoking he is now currently down to 1 to 2 cigarettes a day.  Continues work as a  and needs to have a stress test within this year for his DOT clearance.  Has been trying to eat better.  Has no complaints of chest pain shortness of breath or leg swelling.    Past Medical History:   Diagnosis Date    Breath shortness     Cancer (HCC) 2012    skin    Dental disorder 11/15/2022    partial upper    Diabetes (HCC)     Myocardial infarct (HCC) 10/03/2022    with stents placed     Past Surgical History:   Procedure Laterality Date    OTHER ORTHOPEDIC SURGERY  2014    ACL right knee    OTHER  2012    skin cancer     Family History   Problem Relation Age of Onset    Heart Disease Maternal Grandmother     Diabetes Maternal Grandmother      Social History     Socioeconomic History    Marital status:      Spouse name: Not on file    Number of children: Not on file    Years of education: Not on file    Highest education level: Not on file   Occupational History    Not on file   Tobacco Use    Smoking status: Some Days     Current packs/day: 1.00     Average packs/day: 1 pack/day for 40.0 years (40.0 ttl pk-yrs)     Types: Cigarettes    Smokeless tobacco: Former   Vaping Use    Vaping Use: Never used   Substance and Sexual Activity    Alcohol use: Yes     Comment: occ. q 12  mo ?    Drug use: Never    Sexual activity: Not on file   Other Topics Concern    Not on file   Social History Narrative    Not on file     Social Determinants of Health     Financial Resource Strain: Not on file   Food Insecurity: Not on file   Transportation Needs: Not on file   Physical Activity: Not on file   Stress: Not on file   Social Connections: Not on file   Intimate Partner Violence: Not on file   Housing Stability: Not on file     No Known Allergies  Outpatient Encounter Medications as of 2/7/2024   Medication Sig Dispense Refill    carvedilol (COREG) 12.5 MG Tab Take 1 Tablet by mouth 2 times a day with meals. 180 Tablet 3    clopidogrel (PLAVIX) 75 MG Tab Take 1 Tablet by mouth every day. 90 Tablet 3    prasugrel (EFFIENT) 10 MG Tab Take 1 Tablet by mouth every day. 90 Tablet 2    atorvastatin (LIPITOR) 80 MG tablet TAKE 1 TABLET BY MOUTH ONCE DAILY IN THE EVENING 90 Tablet 0    metFORMIN (GLUCOPHAGE) 500 MG Tab TAKE 2 TABLETS BY MOUTH TWICE DAILY WITH MEALS 360 Tablet 0    JANUVIA 100 MG Tab Take 1 tablet by mouth once daily for 90 days 90 Tablet 3    Fenofibrate 134 MG Cap Take 1 capsule by mouth once daily for 90 days 90 Capsule 3    gabapentin (NEURONTIN) 300 MG Cap Take 4 Capsules by mouth every evening for 360 days. 360 Capsule 3    lisinopril (PRINIVIL) 5 MG Tab Take 1 Tablet by mouth every day. 90 Tablet 3    aspirin 81 MG EC tablet Take 1 Tablet by mouth every day. 30 Tablet 2    [DISCONTINUED] carvedilol (COREG) 12.5 MG Tab Take 1 Tablet by mouth 2 times a day with meals. 90 Tablet 3     No facility-administered encounter medications on file as of 2/7/2024.     Review of Systems   Respiratory:  Negative for shortness of breath.    Cardiovascular:  Negative for chest pain, palpitations, orthopnea and leg swelling.   Neurological:  Negative for dizziness and loss of consciousness.   All other systems reviewed and are negative.             Objective     /64 (BP Location: Left arm, Patient  "Position: Sitting)   Pulse 74   Resp 16   Ht 1.727 m (5' 8\")   Wt 91.6 kg (202 lb)   SpO2 100%   BMI 30.71 kg/m²     Physical Exam  Vitals reviewed.   Constitutional:       General: He is not in acute distress.     Appearance: He is normal weight.   Cardiovascular:      Rate and Rhythm: Normal rate and regular rhythm.      Heart sounds: No murmur heard.  Pulmonary:      Effort: Pulmonary effort is normal. No respiratory distress.      Breath sounds: Normal breath sounds. No rhonchi.   Abdominal:      General: There is no distension.      Tenderness: There is no abdominal tenderness.   Musculoskeletal:      Cervical back: Normal range of motion.      Right lower leg: No edema.      Left lower leg: No edema.   Neurological:      General: No focal deficit present.      Mental Status: He is alert.            Lab Results   Component Value Date/Time    CHOLSTRLTOT 119 08/15/2023 04:27 PM    LDL see below 08/15/2023 04:27 PM    HDL 20 (A) 08/15/2023 04:27 PM    TRIGLYCERIDE 422 (H) 08/15/2023 04:27 PM       Lab Results   Component Value Date/Time    SODIUM 138 02/16/2023 10:20 AM    POTASSIUM 4.5 02/16/2023 10:20 AM    CHLORIDE 105 02/16/2023 10:20 AM    CO2 21 02/16/2023 10:20 AM    GLUCOSE 106 (H) 02/16/2023 10:20 AM    BUN 17 02/16/2023 10:20 AM    CREATININE 0.95 02/16/2023 10:20 AM     Lab Results   Component Value Date/Time    ALKPHOSPHAT 77 02/16/2023 10:20 AM    ASTSGOT 24 02/16/2023 10:20 AM    ALTSGPT 23 02/16/2023 10:20 AM    TBILIRUBIN 0.7 02/16/2023 10:20 AM      Echo 10/3/22:  CONCLUSIONS  No prior study is available for comparison.   Normal left ventricle size and systolic function.  Left ventricular ejection fraction estimated to be 60%.  Normal right ventricular size and systolic function.  No hemodynamically significant valvular heart disease noted.     [facility] 10/3/22:  IMPRESSION:  Severe obstructive three-vessel coronary artery disease involving the left anterior descending, ramus intermedius, and " right coronary arteries.  Acute thrombotic occlusion of the proximal right coronary artery successfully treated with one 3.0 x 48 mm Synergy drug-eluting stent.  Successful percutaneous coronary intervention to the distal right coronary artery into the posterior descending coronary artery with one 2.5 x 24 mm Synergy drug-eluting stent.  Preserved left ventricular systolic function.  Normal left heart filling pressures.     CORONARY ANGIOGRAPHY:  The left main coronary artery is patent and trifurcates into the left anterior descending, ramus intermedius, and left circumflex coronary arteries.  The left anterior descending coronary artery is a large, transapical vessel with proximal 20% disease, mid 70% disease at the takeoff of a large first diagonal branch, and distal luminal irregularities.  It supplies a large first diagonal branch with ostial 70% disease.  The left circumflex coronary artery is a large, nondominant vessel with mid 30% disease.  It supplies one significant obtuse marginal branch with luminal irregularities.  The right coronary artery is a moderate, dominant vessel that is proximally occluded with thrombus.  Following intervention the vessel was seen to have a mid to distal 30% lesion and then a distal 70% lesion just prior to the crux.  Down the crux, the vessel bifurcates into a moderate posterior descending coronary and a moderate posterolateral branch with luminal irregularities.       Assessment & Plan     1. Hx STEMI involving right coronary artery (HCC)  Lipid Profile    carvedilol (COREG) 12.5 MG Tab    clopidogrel (PLAVIX) 75 MG Tab    NM-CARDIAC STRESS TEST    CANCELED: NM-CARDIAC STRESS TEST      2. Pulmonary hypertension (HCC)  carvedilol (COREG) 12.5 MG Tab      3. Cigarette nicotine dependence with nicotine-induced disorder        4. Dyslipidemia        5. Type 2 diabetes mellitus with diabetic neuropathy, without long-term current use of insulin ([facility])        6. S/P right coronary  artery (RCA) stent placement        7. Presence of stent in LAD coronary artery        8. High blood triglycerides        9. Tobacco abuse        10. Coronary artery disease involving native coronary artery of native heart without angina pectoris        11. ST elevation myocardial infarction (STEMI), unspecified artery (HCC)            Medical Decision Making: Today's Assessment/Status/Plan:          CAD, Hx STEMI, s/p MARIAJOSE/PCI to RCA x2  Staged PCI to mid-proximal LAD  Guideline directed medical therapy to include   -Switching patient to Plavix as single agent antiplatelet  -Continue carvedilol 12.5 mg twice daily  - High intensity statin, 80 mg atorvastatin daily  -Rechecking lipid panel, last 1 on record triglycerides were too high to be able to obtain a appropriate LDL level  - Continue lisinopril 5 mg daily     Hypertension  -Blood pressure well-controlled today in clinic  -Continue carvedilol 12.5 and lisinopril 5 mg    DM2  -Treatment per PCP  -Hemoglobin A1c was 8 in the hospital, it is now down to 5.3     Dyslipidemia   hypertriglyceridemia  -Continue atorvastatin 80 mg  -Repeat lipid panel pending  -Continue fenofibrate and continue to monitor     Tobacco use  -Patient is not interested in medications to assist with tobacco cessation, he continues to cut back      Follow-up in 6 months with Dr. Maximino Murphy, MSN, APRN  [facility]  [phone]    Please note this dictation was created using voice recognition software.  I have made every reasonable attempt to correct obvious errors, but there may be errors of grammar and possibly content that I did not discover before finalizing the note.

## 2024-02-07 ENCOUNTER — OFFICE VISIT (OUTPATIENT)
Dept: CARDIOLOGY | Facility: MEDICAL CENTER | Age: 54
End: 2024-02-07
Attending: NURSE PRACTITIONER
Payer: COMMERCIAL

## 2024-02-07 VITALS
DIASTOLIC BLOOD PRESSURE: 64 MMHG | WEIGHT: 202 LBS | SYSTOLIC BLOOD PRESSURE: 114 MMHG | HEART RATE: 74 BPM | BODY MASS INDEX: 30.62 KG/M2 | OXYGEN SATURATION: 100 % | HEIGHT: 68 IN | RESPIRATION RATE: 16 BRPM

## 2024-02-07 DIAGNOSIS — I25.10 CORONARY ARTERY DISEASE INVOLVING NATIVE CORONARY ARTERY OF NATIVE HEART WITHOUT ANGINA PECTORIS: ICD-10-CM

## 2024-02-07 DIAGNOSIS — E78.1 HIGH BLOOD TRIGLYCERIDES: ICD-10-CM

## 2024-02-07 DIAGNOSIS — Z72.0 TOBACCO ABUSE: ICD-10-CM

## 2024-02-07 DIAGNOSIS — E78.5 DYSLIPIDEMIA: ICD-10-CM

## 2024-02-07 DIAGNOSIS — I27.20 PULMONARY HYPERTENSION (HCC): ICD-10-CM

## 2024-02-07 DIAGNOSIS — Z95.5 S/P RIGHT CORONARY ARTERY (RCA) STENT PLACEMENT: ICD-10-CM

## 2024-02-07 DIAGNOSIS — F17.219 CIGARETTE NICOTINE DEPENDENCE WITH NICOTINE-INDUCED DISORDER: ICD-10-CM

## 2024-02-07 DIAGNOSIS — Z95.5 PRESENCE OF STENT IN LAD CORONARY ARTERY: ICD-10-CM

## 2024-02-07 DIAGNOSIS — I21.3 ST ELEVATION MYOCARDIAL INFARCTION (STEMI), UNSPECIFIED ARTERY (HCC): ICD-10-CM

## 2024-02-07 DIAGNOSIS — E11.40 TYPE 2 DIABETES MELLITUS WITH DIABETIC NEUROPATHY, WITHOUT LONG-TERM CURRENT USE OF INSULIN (HCC): ICD-10-CM

## 2024-02-07 DIAGNOSIS — I21.11 STEMI INVOLVING RIGHT CORONARY ARTERY (HCC): ICD-10-CM

## 2024-02-07 PROCEDURE — 3078F DIAST BP <80 MM HG: CPT | Performed by: NURSE PRACTITIONER

## 2024-02-07 PROCEDURE — 3074F SYST BP LT 130 MM HG: CPT | Performed by: NURSE PRACTITIONER

## 2024-02-07 PROCEDURE — 99214 OFFICE O/P EST MOD 30 MIN: CPT | Performed by: NURSE PRACTITIONER

## 2024-02-07 PROCEDURE — 99212 OFFICE O/P EST SF 10 MIN: CPT | Performed by: NURSE PRACTITIONER

## 2024-02-07 RX ORDER — CLOPIDOGREL BISULFATE 75 MG/1
75 TABLET ORAL DAILY
Qty: 90 TABLET | Refills: 3 | Status: SHIPPED | OUTPATIENT
Start: 2024-02-07

## 2024-02-07 RX ORDER — CARVEDILOL 12.5 MG/1
12.5 TABLET ORAL 2 TIMES DAILY WITH MEALS
Qty: 180 TABLET | Refills: 3 | Status: SHIPPED | OUTPATIENT
Start: 2024-02-07

## 2024-02-07 ASSESSMENT — FIBROSIS 4 INDEX: FIB4 SCORE: 1.54

## 2024-02-14 SDOH — ECONOMIC STABILITY: TRANSPORTATION INSECURITY
IN THE PAST 12 MONTHS, HAS THE LACK OF TRANSPORTATION KEPT YOU FROM MEDICAL APPOINTMENTS OR FROM GETTING MEDICATIONS?: NO

## 2024-02-14 SDOH — ECONOMIC STABILITY: FOOD INSECURITY: WITHIN THE PAST 12 MONTHS, YOU WORRIED THAT YOUR FOOD WOULD RUN OUT BEFORE YOU GOT MONEY TO BUY MORE.: NEVER TRUE

## 2024-02-14 SDOH — ECONOMIC STABILITY: HOUSING INSECURITY
IN THE LAST 12 MONTHS, WAS THERE A TIME WHEN YOU DID NOT HAVE A STEADY PLACE TO SLEEP OR SLEPT IN A SHELTER (INCLUDING NOW)?: NO

## 2024-02-14 SDOH — ECONOMIC STABILITY: HOUSING INSECURITY: IN THE LAST 12 MONTHS, HOW MANY PLACES HAVE YOU LIVED?: 1

## 2024-02-14 SDOH — ECONOMIC STABILITY: FOOD INSECURITY: WITHIN THE PAST 12 MONTHS, THE FOOD YOU BOUGHT JUST DIDN'T LAST AND YOU DIDN'T HAVE MONEY TO GET MORE.: NEVER TRUE

## 2024-02-14 SDOH — HEALTH STABILITY: PHYSICAL HEALTH: ON AVERAGE, HOW MANY DAYS PER WEEK DO YOU ENGAGE IN MODERATE TO STRENUOUS EXERCISE (LIKE A BRISK WALK)?: 5 DAYS

## 2024-02-14 SDOH — HEALTH STABILITY: MENTAL HEALTH
STRESS IS WHEN SOMEONE FEELS TENSE, NERVOUS, ANXIOUS, OR CAN'T SLEEP AT NIGHT BECAUSE THEIR MIND IS TROUBLED. HOW STRESSED ARE YOU?: NOT AT ALL

## 2024-02-14 SDOH — HEALTH STABILITY: PHYSICAL HEALTH: ON AVERAGE, HOW MANY MINUTES DO YOU ENGAGE IN EXERCISE AT THIS LEVEL?: 150+ MIN

## 2024-02-14 SDOH — ECONOMIC STABILITY: TRANSPORTATION INSECURITY
IN THE PAST 12 MONTHS, HAS LACK OF TRANSPORTATION KEPT YOU FROM MEETINGS, WORK, OR FROM GETTING THINGS NEEDED FOR DAILY LIVING?: NO

## 2024-02-14 SDOH — ECONOMIC STABILITY: TRANSPORTATION INSECURITY
IN THE PAST 12 MONTHS, HAS LACK OF RELIABLE TRANSPORTATION KEPT YOU FROM MEDICAL APPOINTMENTS, MEETINGS, WORK OR FROM GETTING THINGS NEEDED FOR DAILY LIVING?: NO

## 2024-02-14 SDOH — ECONOMIC STABILITY: INCOME INSECURITY: IN THE LAST 12 MONTHS, WAS THERE A TIME WHEN YOU WERE NOT ABLE TO PAY THE MORTGAGE OR RENT ON TIME?: NO

## 2024-02-14 ASSESSMENT — SOCIAL DETERMINANTS OF HEALTH (SDOH)
HOW OFTEN DO YOU ATTEND CHURCH OR RELIGIOUS SERVICES?: NEVER
DO YOU BELONG TO ANY CLUBS OR ORGANIZATIONS SUCH AS CHURCH GROUPS UNIONS, FRATERNAL OR ATHLETIC GROUPS, OR SCHOOL GROUPS?: NO
HOW OFTEN DO YOU GET TOGETHER WITH FRIENDS OR RELATIVES?: NEVER
WITHIN THE PAST 12 MONTHS, YOU WORRIED THAT YOUR FOOD WOULD RUN OUT BEFORE YOU GOT THE MONEY TO BUY MORE: NEVER TRUE
HOW OFTEN DO YOU ATTENT MEETINGS OF THE CLUB OR ORGANIZATION YOU BELONG TO?: NEVER
DO YOU BELONG TO ANY CLUBS OR ORGANIZATIONS SUCH AS CHURCH GROUPS UNIONS, FRATERNAL OR ATHLETIC GROUPS, OR SCHOOL GROUPS?: NO
IN A TYPICAL WEEK, HOW MANY TIMES DO YOU TALK ON THE PHONE WITH FAMILY, FRIENDS, OR NEIGHBORS?: ONCE A WEEK
HOW OFTEN DO YOU HAVE A DRINK CONTAINING ALCOHOL: MONTHLY OR LESS
HOW OFTEN DO YOU ATTENT MEETINGS OF THE CLUB OR ORGANIZATION YOU BELONG TO?: NEVER
HOW OFTEN DO YOU ATTEND CHURCH OR RELIGIOUS SERVICES?: NEVER
IN A TYPICAL WEEK, HOW MANY TIMES DO YOU TALK ON THE PHONE WITH FAMILY, FRIENDS, OR NEIGHBORS?: ONCE A WEEK
HOW OFTEN DO YOU GET TOGETHER WITH FRIENDS OR RELATIVES?: NEVER
HOW MANY DRINKS CONTAINING ALCOHOL DO YOU HAVE ON A TYPICAL DAY WHEN YOU ARE DRINKING: 1 OR 2
HOW OFTEN DO YOU HAVE SIX OR MORE DRINKS ON ONE OCCASION: NEVER

## 2024-02-14 ASSESSMENT — LIFESTYLE VARIABLES
AUDIT-C TOTAL SCORE: 1
SKIP TO QUESTIONS 9-10: 1
HOW MANY STANDARD DRINKS CONTAINING ALCOHOL DO YOU HAVE ON A TYPICAL DAY: 1 OR 2
HOW OFTEN DO YOU HAVE SIX OR MORE DRINKS ON ONE OCCASION: NEVER
HOW OFTEN DO YOU HAVE A DRINK CONTAINING ALCOHOL: MONTHLY OR LESS

## 2024-02-15 ENCOUNTER — HOSPITAL ENCOUNTER (OUTPATIENT)
Facility: MEDICAL CENTER | Age: 54
End: 2024-02-15
Attending: STUDENT IN AN ORGANIZED HEALTH CARE EDUCATION/TRAINING PROGRAM
Payer: COMMERCIAL

## 2024-02-15 ENCOUNTER — TELEPHONE (OUTPATIENT)
Dept: HEALTH INFORMATION MANAGEMENT | Facility: OTHER | Age: 54
End: 2024-02-15

## 2024-02-15 ENCOUNTER — OFFICE VISIT (OUTPATIENT)
Dept: MEDICAL GROUP | Facility: MEDICAL CENTER | Age: 54
End: 2024-02-15
Payer: COMMERCIAL

## 2024-02-15 VITALS
TEMPERATURE: 97.1 F | BODY MASS INDEX: 31.07 KG/M2 | HEART RATE: 77 BPM | SYSTOLIC BLOOD PRESSURE: 110 MMHG | DIASTOLIC BLOOD PRESSURE: 62 MMHG | WEIGHT: 205 LBS | HEIGHT: 68 IN | OXYGEN SATURATION: 99 % | RESPIRATION RATE: 17 BRPM

## 2024-02-15 DIAGNOSIS — E11.42 DIABETIC POLYNEUROPATHY ASSOCIATED WITH TYPE 2 DIABETES MELLITUS (HCC): ICD-10-CM

## 2024-02-15 DIAGNOSIS — Z23 IMMUNIZATION DUE: ICD-10-CM

## 2024-02-15 DIAGNOSIS — E11.40 TYPE 2 DIABETES MELLITUS WITH DIABETIC NEUROPATHY, WITHOUT LONG-TERM CURRENT USE OF INSULIN (HCC): ICD-10-CM

## 2024-02-15 DIAGNOSIS — Z79.899 HIGH RISK MEDICATION USE: ICD-10-CM

## 2024-02-15 DIAGNOSIS — F17.219 CIGARETTE NICOTINE DEPENDENCE WITH NICOTINE-INDUCED DISORDER: ICD-10-CM

## 2024-02-15 DIAGNOSIS — Z00.00 ENCOUNTER FOR MEDICAL EXAMINATION TO ESTABLISH CARE: ICD-10-CM

## 2024-02-15 DIAGNOSIS — Z12.11 COLON CANCER SCREENING: ICD-10-CM

## 2024-02-15 DIAGNOSIS — E78.5 DYSLIPIDEMIA: ICD-10-CM

## 2024-02-15 DIAGNOSIS — I21.11 STEMI INVOLVING RIGHT CORONARY ARTERY (HCC): ICD-10-CM

## 2024-02-15 PROBLEM — Z09 HOSPITAL DISCHARGE FOLLOW-UP: Status: RESOLVED | Noted: 2022-10-05 | Resolved: 2024-02-15

## 2024-02-15 PROBLEM — Z72.0 TOBACCO ABUSE: Status: RESOLVED | Noted: 2023-02-10 | Resolved: 2024-02-15

## 2024-02-15 PROBLEM — Z02.89 ENCOUNTER FOR COMPLETION OF FORM WITH PATIENT: Status: RESOLVED | Noted: 2022-10-13 | Resolved: 2024-02-15

## 2024-02-15 PROBLEM — E78.1 HIGH BLOOD TRIGLYCERIDES: Status: RESOLVED | Noted: 2023-02-10 | Resolved: 2024-02-15

## 2024-02-15 LAB
CREAT UR-MCNC: 117.09 MG/DL
HBA1C MFR BLD: 5.2 % (ref ?–5.8)
MICROALBUMIN UR-MCNC: <1.2 MG/DL
MICROALBUMIN/CREAT UR: NORMAL MG/G (ref 0–30)
POCT INT CON NEG: NEGATIVE
POCT INT CON POS: POSITIVE

## 2024-02-15 PROCEDURE — 82570 ASSAY OF URINE CREATININE: CPT

## 2024-02-15 PROCEDURE — 99214 OFFICE O/P EST MOD 30 MIN: CPT | Mod: 25 | Performed by: STUDENT IN AN ORGANIZED HEALTH CARE EDUCATION/TRAINING PROGRAM

## 2024-02-15 PROCEDURE — 82043 UR ALBUMIN QUANTITATIVE: CPT

## 2024-02-15 PROCEDURE — 90471 IMMUNIZATION ADMIN: CPT | Performed by: STUDENT IN AN ORGANIZED HEALTH CARE EDUCATION/TRAINING PROGRAM

## 2024-02-15 PROCEDURE — 80307 DRUG TEST PRSMV CHEM ANLYZR: CPT

## 2024-02-15 PROCEDURE — 90715 TDAP VACCINE 7 YRS/> IM: CPT | Performed by: STUDENT IN AN ORGANIZED HEALTH CARE EDUCATION/TRAINING PROGRAM

## 2024-02-15 PROCEDURE — 3074F SYST BP LT 130 MM HG: CPT | Performed by: STUDENT IN AN ORGANIZED HEALTH CARE EDUCATION/TRAINING PROGRAM

## 2024-02-15 PROCEDURE — 3078F DIAST BP <80 MM HG: CPT | Performed by: STUDENT IN AN ORGANIZED HEALTH CARE EDUCATION/TRAINING PROGRAM

## 2024-02-15 PROCEDURE — 90472 IMMUNIZATION ADMIN EACH ADD: CPT | Performed by: STUDENT IN AN ORGANIZED HEALTH CARE EDUCATION/TRAINING PROGRAM

## 2024-02-15 PROCEDURE — 90686 IIV4 VACC NO PRSV 0.5 ML IM: CPT | Performed by: STUDENT IN AN ORGANIZED HEALTH CARE EDUCATION/TRAINING PROGRAM

## 2024-02-15 PROCEDURE — 83036 HEMOGLOBIN GLYCOSYLATED A1C: CPT | Performed by: STUDENT IN AN ORGANIZED HEALTH CARE EDUCATION/TRAINING PROGRAM

## 2024-02-15 RX ORDER — PREGABALIN 100 MG/1
100 CAPSULE ORAL 2 TIMES DAILY
Qty: 180 CAPSULE | Refills: 0 | Status: SHIPPED | OUTPATIENT
Start: 2024-02-15 | End: 2024-05-15

## 2024-02-15 ASSESSMENT — FIBROSIS 4 INDEX: FIB4 SCORE: 1.54

## 2024-02-15 ASSESSMENT — PATIENT HEALTH QUESTIONNAIRE - PHQ9: CLINICAL INTERPRETATION OF PHQ2 SCORE: 0

## 2024-02-15 NOTE — PROGRESS NOTES
Subjective:     CC:  Diagnoses of Encounter for medical examination to establish care, Type 2 diabetes mellitus with diabetic neuropathy, without long-term current use of insulin (HCC), Dyslipidemia, Hx STEMI involving right coronary artery (HCC), Diabetic polyneuropathy associated with type 2 diabetes mellitus (HCC), Cigarette nicotine dependence with nicotine-induced disorder, High risk medication use, Colon cancer screening, and Immunization due were pertinent to this visit.    HISTORY OF THE PRESENT ILLNESS: Patient is a 53 y.o. male. This pleasant patient is here today to establish care and discuss the following    Problem   Diabetic Polyneuropathy Associated With Type 2 Diabetes Mellitus (Hcc)    This is a chronic condition, he has a burning sensation in decree sensation in both feet.  She is on gabapentin 1200 mg nightly without relief.     Dyslipidemia    Chronic condition.currently on atorvastatin 80mg daily, fenofibrate 134mg daily     Type 2 Diabetes Mellitus With Diabetic Neuropathy, Without Long-Term Current Use of Insulin ([facility])    This is a chronic condition with comorbid neuropathy.  Last A1c was very well-controlled.  Current regimen: metformin 1000mg BID, Januvia 100mg daily     Hx STEMI involving right coronary artery (HCC)    Follows with cardiology    STEMI s/p stenting on 10/5/2022. Since last visit, he had additional procedures including percutaneous transluminal coronary angioplasty of the ostial first diagonal branch and percutaneous coronary intervention to the proximal to mid left anterior descending coronary artery on 11/16/2022. Since going home, he has been feeling well. He has been taking his medications as instructed and tolerating well. Denies chest pain, shortness of breath.    Current regimen: aspirin 81mg daily, prasugrel 10mg daily, carvedilol 6.25mg BID, lisinopril 5mg daily, atorvastatin 80mg daily     Per hospital note:  PCI found severe obstructive three vessel disease, but  "acutely significant for  proximal occluding thrombus in the RCA. Two stents were placed in the proximal RCA and the posterior descending coronary artery. Patient complained of mild chest pain the day following PCI which resolved with nitroglycerin; patient has an appointment with cardiology in 4 weeks to assess need for a staged stent of LAD. Patient was discharged on Aspirin 81mg, prasugrel 10mg, and with blood pressure management with lisinopril 5mg and coreg 6.25mg BID.     Nicotine Dependence    Chronic condition. He has been smoking 1ppd since age 12 but cut down to 1-2 cigarettes every 2-3 weeks since his recent STEMI.     Tobacco Abuse (Resolved)   High Blood Triglycerides (Resolved)   Encounter for Completion of Form With Patient (Resolved)   Hospital Discharge Follow-Up (Resolved)     ROS:   ROS      Objective:     Exam: /62   Pulse 77   Temp 36.2 °C (97.1 °F)   Resp 17   Ht 1.727 m (5' 8\")   Wt 93 kg (205 lb)   SpO2 99%  Body mass index is 31.17 kg/m².    Physical Exam  Vitals reviewed.   Constitutional:       General: He is not in acute distress.     Appearance: He is not toxic-appearing.   HENT:      Head: Normocephalic and atraumatic.      Right Ear: External ear normal.      Left Ear: External ear normal.   Eyes:      General:         Right eye: No discharge.         Left eye: No discharge.      Extraocular Movements: Extraocular movements intact.      Conjunctiva/sclera: Conjunctivae normal.   Pulmonary:      Effort: Pulmonary effort is normal. No respiratory distress.   Feet:      Right foot:      Protective Sensation: 4 sites tested.  0 sites sensed.      Left foot:      Protective Sensation: 4 sites tested.   1 site sensed.  Skin:     General: Skin is warm and dry.   Neurological:      Mental Status: He is alert.   Psychiatric:         Mood and Affect: Mood normal.         Behavior: Behavior normal.         Thought Content: Thought content normal.         Judgment: Judgment normal. "           Assessment & Plan:   53 y.o. male with the following -    1. Encounter for medical examination to establish care      2. Type 2 diabetes mellitus with diabetic neuropathy, without long-term current use of insulin (HCC)  Well-controlled today, continue metformin and Januvia at current dosing, labs ordered  - POCT  A1C  - MICROALBUMIN CREAT RATIO URINE; Future  - Diabetic Monofilament LE Exam  - POCT Retinal Eye Exam  - Comp Metabolic Panel; Future    3. Dyslipidemia  Continue atorvastatin and fenofibrate at current dosing  - Lipid Profile; Future    4. Hx STEMI involving right coronary artery (HCC)  Continue to follow with cardiology    5. Diabetic polyneuropathy associated with type 2 diabetes mellitus (HCC)  We discussed switching from gabapentin to Lyrica.  Controlled substance agreement signed, urine drug screen ordered, Lyrica given.  Advised patient to take this at night first and if he is able to tolerate without feeling too drowsy he may take during the day  - Controlled Substance Treatment Agreement  - pregabalin (LYRICA) 100 MG Cap; Take 1 Capsule by mouth 2 times a day for 90 days.  Dispense: 180 Capsule; Refill: 0    6. Cigarette nicotine dependence with nicotine-induced disorder  Discussed lung cancer screening, patient is interested  - REFERRAL TO LUNG CANCER SCREENING PROGRAM    7. High risk medication use  - URINE DRUG SCREEN W/CONF (AR); Future    8. Colon cancer screening  - Referral to GI for Colonoscopy    9. Immunization due  - Tdap =>8yo IM  - Influenza Vaccine Quad Injection (PF)      No follow-ups on file.    Please note that this dictation was created using voice recognition software. I have made every reasonable attempt to correct obvious errors, but I expect that there are errors of grammar and possibly content that I did not discover before finalizing the note.

## 2024-02-16 NOTE — TELEPHONE ENCOUNTER
Outcome: Left Message    Please transfer to Patient Outreach Team at [phone] when patient returns call.    Attempt # 1

## 2024-02-17 LAB
AMPHET CTO UR CFM-MCNC: NEGATIVE NG/ML
BARBITURATES CTO UR CFM-MCNC: NEGATIVE NG/ML
BENZODIAZ CTO UR CFM-MCNC: NEGATIVE NG/ML
CANNABINOIDS CTO UR CFM-MCNC: NEGATIVE NG/ML
COCAINE CTO UR CFM-MCNC: NEGATIVE NG/ML
CREAT UR-MCNC: 113.8 MG/DL (ref 20–400)
DRUG COMMENT 753798: NORMAL
METHADONE CTO UR CFM-MCNC: NEGATIVE NG/ML
OPIATES CTO UR CFM-MCNC: NEGATIVE NG/ML
PCP CTO UR CFM-MCNC: NEGATIVE NG/ML
PROPOXYPH CTO UR CFM-MCNC: NEGATIVE NG/ML

## 2024-02-22 NOTE — TELEPHONE ENCOUNTER
Outcome: Left Message    Please transfer to Patient Outreach Team at [phone] when patient returns call.    Attempt # 2

## 2024-02-28 NOTE — TELEPHONE ENCOUNTER
Age 50-77 yrs of age?   53 total years    Total Years Smoking cigarettes?   41 total years    20 pack year hx of smoking, or greater (average packs per day)?    41 years @ 1ppd = 41 total pack years    Current smoker or if quit, has pt quit within last 15 yrs?   Current Smoker    Completed Lung Cancer screen CT with [facility] previously?   None    Anything noted on previous CT involving lungs? ([place], [place], Etc.)   None    Any signs or symptoms of lung cancer? (New / change to Cough, Wheezing, S.O.B., coughing up blood, unexplained weight loss within last year)?    None    Previous history of lung cancer?   None

## 2024-02-29 DIAGNOSIS — E78.5 DYSLIPIDEMIA: ICD-10-CM

## 2024-02-29 RX ORDER — ATORVASTATIN CALCIUM 80 MG/1
80 TABLET, FILM COATED ORAL EVERY EVENING
Qty: 90 TABLET | Refills: 3 | Status: SHIPPED | OUTPATIENT
Start: 2024-02-29

## 2024-02-29 NOTE — TELEPHONE ENCOUNTER
Received request via: Pharmacy    Was the patient seen in the last year in this department? Yes    Does the patient have an active prescription (recently filled or refills available) for medication(s) requested? No    Pharmacy Name: [facility]    Does the patient have USP Plus and need 100 day supply (blood pressure, diabetes and cholesterol meds only)? Patient does not have SCP

## 2024-03-14 DIAGNOSIS — I25.10 CORONARY ARTERY DISEASE INVOLVING NATIVE CORONARY ARTERY OF NATIVE HEART WITHOUT ANGINA PECTORIS: ICD-10-CM

## 2024-03-18 NOTE — TELEPHONE ENCOUNTER
Is the patient due for a refill? Yes    Was the patient seen the past year? Yes    Date of last office visit: 2/7/2024    Does the patient have an upcoming appointment?  Yes   If yes, When? 7/30/2024    Provider to refill:DB    Does the patients insurance require a 100 day supply?  No

## 2024-03-20 RX ORDER — PRASUGREL 10 MG/1
10 TABLET, FILM COATED ORAL DAILY
Qty: 90 TABLET | Refills: 3 | Status: SHIPPED | OUTPATIENT
Start: 2024-03-20

## 2024-03-21 DIAGNOSIS — E78.5 DYSLIPIDEMIA: ICD-10-CM

## 2024-03-21 DIAGNOSIS — I21.11 STEMI INVOLVING RIGHT CORONARY ARTERY (HCC): ICD-10-CM

## 2024-03-21 RX ORDER — LISINOPRIL 5 MG/1
5 TABLET ORAL DAILY
Qty: 90 TABLET | Refills: 3 | Status: SHIPPED | OUTPATIENT
Start: 2024-03-21

## 2024-03-21 NOTE — TELEPHONE ENCOUNTER
Is the patient due for a refill? Yes    Was the patient seen the past year? Yes    Date of last office visit: 2/7/24    Does the patient have an upcoming appointment?  Yes   If yes, When? 7/30/24    Provider to refill:DB    Does the patients insurance require a 100 day supply?  No

## 2024-03-25 ENCOUNTER — TELEPHONE (OUTPATIENT)
Dept: CARDIOLOGY | Facility: MEDICAL CENTER | Age: 54
End: 2024-03-25
Payer: COMMERCIAL

## 2024-03-25 NOTE — TELEPHONE ENCOUNTER
Last OV: 2/7/2024  Proposed Surgery: colonoscopy with deep propofol sedation   Surgery Date: TBD  Requesting Office Name: GI  Fax Number: [phone]  Preference of Location (default is surgery center unless specified by Cardiologist or [facility])  Prior Clearance Addressed: No      Anticoags/Antiplatelets: Other  prasugrel (EFFIENT)  Anticoags/Antiplatelet managed by Cardiology? YES    Outstanding Cardiac Imaging : Yes  Stress Test.  Clearance to provider to review  Stent, Cardiac Devices, or Catheterization: Yes  Date : 11/16/2022   Ablation, TAVR/Valve (including open heart), Cardioversion: No  Recent Cardiac Hospitalization: Yes  Date:  11/16/2022            When: Greater than 3 months since hospitalization   History (cardiac history):   Past Medical History:   Diagnosis Date    Breath shortness     Cancer (HCC) 2012    skin    Dental disorder 11/15/2022    partial upper    Diabetes (HCC)     Myocardial infarct (HCC) 10/03/2022    with stents placed             Surgical Clearance Letter Sent: No Provider to advise.   **Scan clearance request letter into [facility].**

## 2024-03-28 NOTE — TELEPHONE ENCOUNTER
Jace Mohr is cleared for colonoscopy at a low risk for the development of perioperative cardiac events; the plavix can be held for at least 2 days prior to the procedure or for the prescribed period of time felt to be necessary by the performing physician and is to be restarted after the procedure at the direction of the physician performing the procedure and when post procedure bleeding risk deemed to be acceptable. We would recommend that patient switch to aspirin during this time if approved by his GI provider.     This individual is at low/ moderate risk for cardiovascular event during the low/moderate risk surgery. His risk factors are non modifiable. He may proceed with surgery with no additional cardiac testing or procedure.     Currently is pending a stress test for work clearance.

## 2024-05-28 ENCOUNTER — OFFICE VISIT (OUTPATIENT)
Dept: MEDICAL GROUP | Facility: MEDICAL CENTER | Age: 54
End: 2024-05-28
Payer: COMMERCIAL

## 2024-05-28 VITALS
DIASTOLIC BLOOD PRESSURE: 64 MMHG | HEIGHT: 68 IN | TEMPERATURE: 97.5 F | OXYGEN SATURATION: 100 % | WEIGHT: 207 LBS | BODY MASS INDEX: 31.37 KG/M2 | SYSTOLIC BLOOD PRESSURE: 116 MMHG | HEART RATE: 65 BPM

## 2024-05-28 DIAGNOSIS — E11.42 DIABETIC POLYNEUROPATHY ASSOCIATED WITH TYPE 2 DIABETES MELLITUS (HCC): ICD-10-CM

## 2024-05-28 DIAGNOSIS — E78.5 DYSLIPIDEMIA: ICD-10-CM

## 2024-05-28 DIAGNOSIS — E11.40 TYPE 2 DIABETES MELLITUS WITH DIABETIC NEUROPATHY, WITHOUT LONG-TERM CURRENT USE OF INSULIN (HCC): ICD-10-CM

## 2024-05-28 PROCEDURE — 3078F DIAST BP <80 MM HG: CPT | Performed by: STUDENT IN AN ORGANIZED HEALTH CARE EDUCATION/TRAINING PROGRAM

## 2024-05-28 PROCEDURE — 99214 OFFICE O/P EST MOD 30 MIN: CPT | Performed by: STUDENT IN AN ORGANIZED HEALTH CARE EDUCATION/TRAINING PROGRAM

## 2024-05-28 PROCEDURE — 3074F SYST BP LT 130 MM HG: CPT | Performed by: STUDENT IN AN ORGANIZED HEALTH CARE EDUCATION/TRAINING PROGRAM

## 2024-05-28 RX ORDER — PREGABALIN 100 MG/1
100 CAPSULE ORAL 2 TIMES DAILY
Qty: 180 CAPSULE | Refills: 0 | Status: SHIPPED | OUTPATIENT
Start: 2024-05-28 | End: 2024-08-26

## 2024-05-28 ASSESSMENT — FIBROSIS 4 INDEX: FIB4 SCORE: 1.54

## 2024-05-28 NOTE — PROGRESS NOTES
"Subjective:     CC: Diabetes, dyslipidemia, diabetic polyneuropathy    HPI:   José presents today with    Problem   Diabetic Polyneuropathy Associated With Type 2 Diabetes Mellitus (Hcc)    This is a chronic condition, he does think it was improving with Lyrica.  Previously was on gabapentin with minimal results.  He wants to leave the dose of Lyrica alone for now and will let me know at the next visit if he needs any further increases     Dyslipidemia    Chronic condition.currently on atorvastatin 80mg daily, fenofibrate 134mg daily, labs pending     Type 2 Diabetes Mellitus With Diabetic Neuropathy, Without Long-Term Current Use of Insulin (Hcc)    This is a chronic condition with comorbid neuropathy.  Last A1c was very well-controlled.  Current regimen: metformin 1000mg BID, Januvia 100mg daily          ROS:  ROS    Objective:     Exam:  /64   Pulse 65   Temp 36.4 °C (97.5 °F)   Ht 1.727 m (5' 8\")   Wt 93.9 kg (207 lb)   SpO2 100%   BMI 31.47 kg/m²  Body mass index is 31.47 kg/m².    Physical Exam  Vitals reviewed.   Constitutional:       General: He is not in acute distress.     Appearance: He is not toxic-appearing.      Comments: Exam through observation only   HENT:      Head: Normocephalic and atraumatic.      Right Ear: External ear normal.      Left Ear: External ear normal.   Eyes:      General:         Right eye: No discharge.         Left eye: No discharge.      Extraocular Movements: Extraocular movements intact.      Conjunctiva/sclera: Conjunctivae normal.   Pulmonary:      Effort: Pulmonary effort is normal. No respiratory distress.   Skin:     General: Skin is warm and dry.   Neurological:      Mental Status: He is alert.   Psychiatric:         Mood and Affect: Mood normal.         Behavior: Behavior normal.         Thought Content: Thought content normal.         Judgment: Judgment normal.           Assessment & Plan:     53 y.o. male with the following -     1. Dyslipidemia  Labs " are pending, for now continue statin and fenofibrate at current dosing    2. Diabetic polyneuropathy associated with type 2 diabetes mellitus (HCC)  Well-controlled with current dose of Lyrica, refill given, PDMP reviewed, controlled substance agreement on file and urine drug screen on file  - pregabalin (LYRICA) 100 MG Cap; Take 1 Capsule by mouth 2 times a day for 90 days. Indications: Diabetes with Nerve Disease  Dispense: 180 Capsule; Refill: 0    3. Type 2 diabetes mellitus with diabetic neuropathy, without long-term current use of insulin (HCC)  Very well-controlled, continue metformin and Januvia        No follow-ups on file.    Please note that this dictation was created using voice recognition software. I have made every reasonable attempt to correct obvious errors, but I expect that there are errors of grammar and possibly content that I did not discover before finalizing the note.

## 2024-07-16 ENCOUNTER — TELEPHONE (OUTPATIENT)
Dept: CARDIOLOGY | Facility: MEDICAL CENTER | Age: 54
End: 2024-07-16
Payer: COMMERCIAL

## 2024-07-30 ENCOUNTER — HOSPITAL ENCOUNTER (OUTPATIENT)
Dept: LAB | Facility: MEDICAL CENTER | Age: 54
End: 2024-07-30
Attending: NURSE PRACTITIONER
Payer: COMMERCIAL

## 2024-07-30 ENCOUNTER — OFFICE VISIT (OUTPATIENT)
Dept: CARDIOLOGY | Facility: MEDICAL CENTER | Age: 54
End: 2024-07-30
Attending: INTERNAL MEDICINE
Payer: COMMERCIAL

## 2024-07-30 VITALS
WEIGHT: 211 LBS | HEART RATE: 66 BPM | HEIGHT: 68 IN | SYSTOLIC BLOOD PRESSURE: 122 MMHG | DIASTOLIC BLOOD PRESSURE: 82 MMHG | OXYGEN SATURATION: 98 % | BODY MASS INDEX: 31.98 KG/M2

## 2024-07-30 DIAGNOSIS — E78.5 DYSLIPIDEMIA: ICD-10-CM

## 2024-07-30 DIAGNOSIS — I21.11 STEMI INVOLVING RIGHT CORONARY ARTERY (HCC): ICD-10-CM

## 2024-07-30 DIAGNOSIS — E11.40 TYPE 2 DIABETES MELLITUS WITH DIABETIC NEUROPATHY, WITHOUT LONG-TERM CURRENT USE OF INSULIN (HCC): ICD-10-CM

## 2024-07-30 DIAGNOSIS — I25.10 CORONARY ARTERY DISEASE INVOLVING NATIVE CORONARY ARTERY OF NATIVE HEART WITHOUT ANGINA PECTORIS: ICD-10-CM

## 2024-07-30 DIAGNOSIS — Z95.5 STENTED CORONARY ARTERY: ICD-10-CM

## 2024-07-30 PROBLEM — F17.200 NICOTINE DEPENDENCE: Status: RESOLVED | Noted: 2022-10-03 | Resolved: 2024-07-30

## 2024-07-30 LAB
ALBUMIN SERPL BCP-MCNC: 4.5 G/DL (ref 3.2–4.9)
ALBUMIN/GLOB SERPL: 1.5 G/DL
ALP SERPL-CCNC: 55 U/L (ref 30–99)
ALT SERPL-CCNC: 26 U/L (ref 2–50)
ANION GAP SERPL CALC-SCNC: 10 MMOL/L (ref 7–16)
AST SERPL-CCNC: 31 U/L (ref 12–45)
BILIRUB SERPL-MCNC: 0.7 MG/DL (ref 0.1–1.5)
BUN SERPL-MCNC: 14 MG/DL (ref 8–22)
CALCIUM ALBUM COR SERPL-MCNC: 9 MG/DL (ref 8.5–10.5)
CALCIUM SERPL-MCNC: 9.4 MG/DL (ref 8.4–10.2)
CHLORIDE SERPL-SCNC: 104 MMOL/L (ref 96–112)
CHOLEST SERPL-MCNC: 119 MG/DL (ref 100–199)
CO2 SERPL-SCNC: 23 MMOL/L (ref 20–33)
CREAT SERPL-MCNC: 0.98 MG/DL (ref 0.5–1.4)
EKG IMPRESSION: NORMAL
FASTING STATUS PATIENT QL REPORTED: NORMAL
GFR SERPLBLD CREATININE-BSD FMLA CKD-EPI: 90 ML/MIN/1.73 M 2
GLOBULIN SER CALC-MCNC: 3 G/DL (ref 1.9–3.5)
GLUCOSE SERPL-MCNC: 144 MG/DL (ref 65–99)
HDLC SERPL-MCNC: 25 MG/DL
LDLC SERPL CALC-MCNC: 33 MG/DL
POTASSIUM SERPL-SCNC: 4.4 MMOL/L (ref 3.6–5.5)
PROT SERPL-MCNC: 7.5 G/DL (ref 6–8.2)
SODIUM SERPL-SCNC: 137 MMOL/L (ref 135–145)
TRIGL SERPL-MCNC: 305 MG/DL (ref 0–149)

## 2024-07-30 PROCEDURE — 93005 ELECTROCARDIOGRAM TRACING: CPT | Performed by: INTERNAL MEDICINE

## 2024-07-30 PROCEDURE — 80053 COMPREHEN METABOLIC PANEL: CPT

## 2024-07-30 PROCEDURE — 36415 COLL VENOUS BLD VENIPUNCTURE: CPT

## 2024-07-30 PROCEDURE — 99212 OFFICE O/P EST SF 10 MIN: CPT | Performed by: INTERNAL MEDICINE

## 2024-07-30 PROCEDURE — 80061 LIPID PANEL: CPT

## 2024-07-30 ASSESSMENT — ENCOUNTER SYMPTOMS
MYALGIAS: 0
MUSCULOSKELETAL NEGATIVE: 1
PALPITATIONS: 0
BRUISES/BLEEDS EASILY: 0
COUGH: 0
NERVOUS/ANXIOUS: 0
CONSTITUTIONAL NEGATIVE: 1
ABDOMINAL PAIN: 0
FOCAL WEAKNESS: 0
NAUSEA: 0
PSYCHIATRIC NEGATIVE: 1
GASTROINTESTINAL NEGATIVE: 1
WEAKNESS: 0
VOMITING: 0
FEVER: 0
EYES NEGATIVE: 1
DIZZINESS: 0
WEIGHT LOSS: 0
NEUROLOGICAL NEGATIVE: 1
SHORTNESS OF BREATH: 0
HEADACHES: 0
BLURRED VISION: 0
CHILLS: 0
CLAUDICATION: 0
CARDIOVASCULAR NEGATIVE: 1
DEPRESSION: 0
DOUBLE VISION: 0
RESPIRATORY NEGATIVE: 1

## 2024-07-30 ASSESSMENT — FIBROSIS 4 INDEX: FIB4 SCORE: 1.9

## 2024-10-16 DIAGNOSIS — E78.5 DYSLIPIDEMIA: ICD-10-CM

## 2024-10-16 RX ORDER — FENOFIBRATE 134 MG/1
1 CAPSULE ORAL DAILY
Qty: 90 CAPSULE | Refills: 3 | Status: SHIPPED | OUTPATIENT
Start: 2024-10-16

## 2024-11-05 ENCOUNTER — HOSPITAL ENCOUNTER (OUTPATIENT)
Dept: RADIOLOGY | Facility: MEDICAL CENTER | Age: 54
End: 2024-11-05
Attending: NURSE PRACTITIONER
Payer: COMMERCIAL

## 2024-11-05 DIAGNOSIS — I21.11 STEMI INVOLVING RIGHT CORONARY ARTERY (HCC): ICD-10-CM

## 2024-11-05 PROCEDURE — 78452 HT MUSCLE IMAGE SPECT MULT: CPT | Mod: 26 | Performed by: INTERNAL MEDICINE

## 2024-11-05 PROCEDURE — A9502 TC99M TETROFOSMIN: HCPCS

## 2024-11-05 PROCEDURE — 93018 CV STRESS TEST I&R ONLY: CPT | Performed by: INTERNAL MEDICINE

## 2024-11-06 ENCOUNTER — TELEPHONE (OUTPATIENT)
Dept: CARDIOLOGY | Facility: MEDICAL CENTER | Age: 54
End: 2024-11-06
Payer: COMMERCIAL

## 2024-11-06 NOTE — TELEPHONE ENCOUNTER
To Schedulers,  please schedule [facility] to discuss stress test results as advised by Dr. Stanton, thank you!    attempted to call pt, [phone]  unable to reach.  Left detailed message on voicemail regarding JI recommendations and to schedule [facility] to further discuss and to return this call at their earliest convenience if he has further questions or concerns.

## 2024-11-06 NOTE — TELEPHONE ENCOUNTER
----- Message from Physician Sandeep Stanton M.D. sent at 11/6/2024  7:45 AM PST -----  Please notify patient of equivocal myocardial perfusion imaging study showing small distal infarct without clinical significance. We will discuss further at follow up.  Thank you.  ROSALIND

## 2024-12-09 RX ORDER — SITAGLIPTIN 100 MG/1
100 TABLET, FILM COATED ORAL DAILY
Qty: 90 TABLET | Refills: 0 | Status: SHIPPED | OUTPATIENT
Start: 2024-12-09

## 2025-01-07 ENCOUNTER — OFFICE VISIT (OUTPATIENT)
Dept: CARDIOLOGY | Facility: MEDICAL CENTER | Age: 55
End: 2025-01-07
Attending: INTERNAL MEDICINE
Payer: COMMERCIAL

## 2025-01-07 VITALS
OXYGEN SATURATION: 98 % | HEART RATE: 76 BPM | HEIGHT: 68 IN | BODY MASS INDEX: 31.07 KG/M2 | DIASTOLIC BLOOD PRESSURE: 76 MMHG | SYSTOLIC BLOOD PRESSURE: 116 MMHG | WEIGHT: 205 LBS

## 2025-01-07 DIAGNOSIS — I25.10 CORONARY ARTERY DISEASE INVOLVING NATIVE CORONARY ARTERY OF NATIVE HEART WITHOUT ANGINA PECTORIS: ICD-10-CM

## 2025-01-07 DIAGNOSIS — E78.5 DYSLIPIDEMIA: ICD-10-CM

## 2025-01-07 DIAGNOSIS — Z95.5 STENTED CORONARY ARTERY: ICD-10-CM

## 2025-01-07 PROCEDURE — 3078F DIAST BP <80 MM HG: CPT | Performed by: INTERNAL MEDICINE

## 2025-01-07 PROCEDURE — 99214 OFFICE O/P EST MOD 30 MIN: CPT | Performed by: INTERNAL MEDICINE

## 2025-01-07 PROCEDURE — 3074F SYST BP LT 130 MM HG: CPT | Performed by: INTERNAL MEDICINE

## 2025-01-07 ASSESSMENT — ENCOUNTER SYMPTOMS
PSYCHIATRIC NEGATIVE: 1
CLAUDICATION: 0
DIZZINESS: 0
BRUISES/BLEEDS EASILY: 0
FEVER: 0
ABDOMINAL PAIN: 0
WEIGHT LOSS: 0
NEUROLOGICAL NEGATIVE: 1
FOCAL WEAKNESS: 0
DEPRESSION: 0
COUGH: 0
MYALGIAS: 0
NAUSEA: 0
DOUBLE VISION: 0
RESPIRATORY NEGATIVE: 1
CARDIOVASCULAR NEGATIVE: 1
NERVOUS/ANXIOUS: 0
CHILLS: 0
SHORTNESS OF BREATH: 0
EYES NEGATIVE: 1
GASTROINTESTINAL NEGATIVE: 1
WEAKNESS: 0
HEADACHES: 0
CONSTITUTIONAL NEGATIVE: 1
BLURRED VISION: 0
MUSCULOSKELETAL NEGATIVE: 1
PALPITATIONS: 0
VOMITING: 0

## 2025-01-07 NOTE — PROGRESS NOTES
Chief Complaint   Patient presents with    Coronary Artery Disease     F/V Dx: Coronary artery disease involving native coronary artery of native heart without angina pectoris    MI (Non ST Segment Elevation MI)     F/V Dx: Hx STEMI involving right coronary artery (HCC)    Dyslipidemia       Subjective     Jace Mohr is a 54 y.o. male who presents today for follow up of coronary artery disease with prior stent placement, dyslipidemia.    Since the patient's last visit on 07/30/24, he has been doing well clinically from cardiac standpoint. He denies fatigue, chest pain, shortness of breath, palpitations, lower extremity edema, dizziness or syncope. He keeps active walking 2 to 3 miles daily.     Past Medical History:   Diagnosis Date    Breath shortness     CAD (coronary artery disease)     Cancer (HCC) 2012    skin    Dental disorder 11/15/2022    partial upper    Diabetes (HCC)     Hyperlipidemia     Myocardial infarct (HCC) 10/03/2022    with stents placed     Past Surgical History:   Procedure Laterality Date    OTHER ORTHOPEDIC SURGERY  2014    ACL right knee    OTHER  2012    skin cancer    ANGIOPLASTY      ZZZ CARDIAC CATH       Family History   Problem Relation Age of Onset    Heart Disease Maternal Grandmother     Diabetes Maternal Grandmother     Heart Attack Neg Hx      Social History     Socioeconomic History    Marital status:      Spouse name: Not on file    Number of children: Not on file    Years of education: Not on file    Highest education level: 12th grade   Occupational History    Not on file   Tobacco Use    Smoking status: Former     Current packs/day: 1.00     Average packs/day: 1 pack/day for 40.0 years (40.0 ttl pk-yrs)     Types: Cigarettes    Smokeless tobacco: Former   Vaping Use    Vaping status: Never Used   Substance and Sexual Activity    Alcohol use: Yes     Comment: occ. q 12 mo ?    Drug use: Never    Sexual activity: Not on file   Other Topics Concern    Not on file    Social History Narrative    Not on file     Social Drivers of Health     Financial Resource Strain: Not on file   Food Insecurity: No Food Insecurity (2/14/2024)    Hunger Vital Sign     Worried About Running Out of Food in the Last Year: Never true     Ran Out of Food in the Last Year: Never true   Transportation Needs: No Transportation Needs (2/14/2024)    PRAPARE - Transportation     Lack of Transportation (Medical): No     Lack of Transportation (Non-Medical): No   Physical Activity: Sufficiently Active (2/14/2024)    Exercise Vital Sign     Days of Exercise per Week: 5 days     Minutes of Exercise per Session: 150+ min   Stress: No Stress Concern Present (2/14/2024)    [employer] - Occupational Stress Questionnaire     Feeling of Stress : Not at all   Social Connections: Socially Isolated (2/14/2024)    Social Connection and Isolation Panel [NHANES]     Frequency of Communication with Friends and Family: Once a week     Frequency of Social Gatherings with Friends and Family: Never     Attends Hindu Services: Never     Active Member of Clubs or Organizations: No     Attends Club or Organization Meetings: Never     Marital Status:    Intimate Partner Violence: Not on file   Housing Stability: Low Risk  (2/14/2024)    Housing Stability Vital Sign     Unable to Pay for Housing in the Last Year: No     Number of Places Lived in the Last Year: 1     Unstable Housing in the Last Year: No     No Known Allergies    (Medications reviewed.)  Outpatient Encounter Medications as of 1/7/2025   Medication Sig Dispense Refill    JANUVIA 100 MG Tab Take 1 tablet by mouth once daily for 90 days 90 Tablet 0    Fenofibrate 134 MG Cap Take 1 Capsule by mouth every day. FOR 90 DAYS 90 Capsule 3    lisinopril (PRINIVIL) 5 MG Tab Take 1 Tablet by mouth every day. 90 Tablet 3    metFORMIN (GLUCOPHAGE) 500 MG Tab TAKE 2 TABLETS BY MOUTH TWICE DAILY WITH MEALS 360 Tablet 3    atorvastatin  "(LIPITOR) 80 MG tablet TAKE 1 TABLET BY MOUTH ONCE DAILY IN THE EVENING 90 Tablet 3    carvedilol (COREG) 12.5 MG Tab Take 1 Tablet by mouth 2 times a day with meals. 180 Tablet 3    aspirin 81 MG EC tablet Take 1 Tablet by mouth every day. 30 Tablet 2     No facility-administered encounter medications on file as of 1/7/2025.     Review of Systems   Constitutional: Negative.  Negative for chills, fever, malaise/fatigue and weight loss.   HENT: Negative.  Negative for hearing loss.    Eyes: Negative.  Negative for blurred vision and double vision.   Respiratory: Negative.  Negative for cough and shortness of breath.    Cardiovascular: Negative.  Negative for chest pain, palpitations, claudication and leg swelling.   Gastrointestinal: Negative.  Negative for abdominal pain, nausea and vomiting.   Genitourinary: Negative.  Negative for dysuria and urgency.   Musculoskeletal: Negative.  Negative for joint pain and myalgias.   Skin: Negative.  Negative for itching and rash.   Neurological: Negative.  Negative for dizziness, focal weakness, weakness and headaches.   Endo/Heme/Allergies: Negative.  Does not bruise/bleed easily.   Psychiatric/Behavioral: Negative.  Negative for depression. The patient is not nervous/anxious.               Objective     /76 (BP Location: Left arm, Patient Position: Sitting, BP Cuff Size: Adult)   Pulse 76   Ht 1.727 m (5' 8\")   Wt 93 kg (205 lb)   SpO2 98%   BMI 31.17 kg/m²     Physical Exam  Constitutional:       Appearance: Normal appearance. He is well-developed and normal weight.   HENT:      Head: Normocephalic and atraumatic.   Neck:      Vascular: No JVD.   Cardiovascular:      Rate and Rhythm: Normal rate and regular rhythm.      Heart sounds: Normal heart sounds.   Pulmonary:      Effort: Pulmonary effort is normal.      Breath sounds: Normal breath sounds.   Abdominal:      General: Bowel sounds are normal.      Palpations: Abdomen is soft.      Comments: No " hepatosplenomegaly.   Musculoskeletal:         General: Normal range of motion.   Lymphadenopathy:      Cervical: No cervical adenopathy.   Skin:     General: Skin is warm and dry.   Neurological:      Mental Status: He is alert and oriented to person, place, and time.            CARDIAC STUDIES/PROCEDURES:     CARDIAC CATHETERIZATION CONCLUSIONS by Juan Michael (11/16/22)  Successful percutaneous coronary intervention to the proximal to mid left anterior descending coronary with 4.0 x 34 mm John drug-eluting stentsuccessful percutaneous transluminal coronary angioplasty of the ostial first diagonal branch.  Widely patent right coronary artery stents.    CARDIAC CATHETERIZATION CONCLUSIONS by Juan Michael (10/03/22)  Severe obstructive three-vessel coronary artery disease involving the left anterior descending, ramus intermedius, and right coronary arteries.  Acute thrombotic occlusion of the proximal right coronary artery successfully treated with one 3.0 x 48 mm Synergy drug-eluting stent.  Successful percutaneous coronary intervention to the distal right coronary artery into the posterior descending coronary artery with one 2.5 x 24 mm Synergy drug-eluting stent.  Preserved left ventricular systolic function.    ECHOCARDIOGRAM CONCLUSIONS (10/03/22)  No prior study is available for comparison.   Normal left ventricle size and systolic function.  Left ventricular ejection fraction estimated to be 60%.  Normal right ventricular size and systolic function.  No hemodynamically significant valvular heart disease noted.    EKG performed on (07/30/24) was reviewed: EKG personally interpreted shows sinus rhythm.   EKG performed on (10/03/22) EKG shows sinus rhythm with inferior ST segment abnormalities.     Laboratory results of (07/30/24) Cholesterol profile of 119/305/25/33 mg/dL noted.     MYOCARDIAL PERFUSION STUDY CONCLUSIONS (11/05/24)  NUCLEAR IMAGING INTERPRETATION  No evidence of ischemia.     Possible area of small infarct in the distal inferior wall. SDS 0  Low-normal estimated LVSF, stress LVEF 49%  Negative/normal TID 0.99  ECG INTERPRETATION  Fair exercise capacity for age and gender achieving target heart rate and 6.9  METS on the treadmill Panchito protocol for 5:25min  Normal blood pressure response to stress  Adequate workload  Positive stress EKG for ischemia particularly in the inferior and anterolateral leads  (study result reviewed)    Assessment & Plan     1. Coronary artery disease involving native coronary artery of native heart without angina pectoris        2. Stented coronary artery        3. Dyslipidemia            Medical Decision Making: Today's Assessment/Status/Plan:        Coronary artery disease with stent placement: He is a 54 year old male with coronary artery disease with prior stent placement, dyslipidemia. He is clinically doing well without recurrence of his angina. We will continue with current medical care including aspirin, carvedilol, lisinopril and atorvastatin.  Hyperlipidemia: He is doing well on statin therapy without myalgia symptoms.     We will follow up the patient in 1 year.    CC Ginger Rapp

## 2025-01-30 DIAGNOSIS — I21.11 STEMI INVOLVING RIGHT CORONARY ARTERY (HCC): ICD-10-CM

## 2025-01-30 DIAGNOSIS — I27.20 PULMONARY HYPERTENSION (HCC): ICD-10-CM

## 2025-01-30 RX ORDER — CARVEDILOL 12.5 MG/1
12.5 TABLET ORAL 2 TIMES DAILY WITH MEALS
Qty: 180 TABLET | Refills: 3 | Status: SHIPPED | OUTPATIENT
Start: 2025-01-30

## 2025-01-30 NOTE — TELEPHONE ENCOUNTER
.Is the patient due for a refill? Yes    Was the patient seen the last 15 months? Yes    Date of last office visit: 1/7/25    Does the patient have an upcoming appointment?  No    Provider to refill:JI    Does the patient have group home Plus and need 100-day supply? (This applies to ALL medications) Patient does not have SCP

## 2025-02-19 DIAGNOSIS — E78.5 DYSLIPIDEMIA: ICD-10-CM

## 2025-02-19 RX ORDER — ATORVASTATIN CALCIUM 80 MG/1
80 TABLET, FILM COATED ORAL EVERY EVENING
Qty: 90 TABLET | Refills: 0 | Status: SHIPPED | OUTPATIENT
Start: 2025-02-19

## 2025-02-20 ENCOUNTER — APPOINTMENT (OUTPATIENT)
Dept: RADIOLOGY | Facility: MEDICAL CENTER | Age: 55
End: 2025-02-20
Attending: EMERGENCY MEDICINE
Payer: COMMERCIAL

## 2025-02-20 ENCOUNTER — HOSPITAL ENCOUNTER (EMERGENCY)
Facility: MEDICAL CENTER | Age: 55
End: 2025-02-20
Attending: EMERGENCY MEDICINE
Payer: COMMERCIAL

## 2025-02-20 VITALS
OXYGEN SATURATION: 94 % | DIASTOLIC BLOOD PRESSURE: 70 MMHG | HEIGHT: 68 IN | RESPIRATION RATE: 21 BRPM | HEART RATE: 94 BPM | TEMPERATURE: 99.7 F | SYSTOLIC BLOOD PRESSURE: 117 MMHG | BODY MASS INDEX: 30.41 KG/M2 | WEIGHT: 200.62 LBS

## 2025-02-20 DIAGNOSIS — J10.1 INFLUENZA A: ICD-10-CM

## 2025-02-20 LAB
ALBUMIN SERPL BCP-MCNC: 4.7 G/DL (ref 3.2–4.9)
ALBUMIN/GLOB SERPL: 1.4 G/DL
ALP SERPL-CCNC: 55 U/L (ref 30–99)
ALT SERPL-CCNC: 17 U/L (ref 2–50)
ANION GAP SERPL CALC-SCNC: 14 MMOL/L (ref 7–16)
AST SERPL-CCNC: 48 U/L (ref 12–45)
BASOPHILS # BLD AUTO: 0.2 % (ref 0–1.8)
BASOPHILS # BLD: 0.01 K/UL (ref 0–0.12)
BILIRUB SERPL-MCNC: 0.9 MG/DL (ref 0.1–1.5)
BUN SERPL-MCNC: 26 MG/DL (ref 8–22)
CALCIUM ALBUM COR SERPL-MCNC: 8.7 MG/DL (ref 8.5–10.5)
CALCIUM SERPL-MCNC: 9.3 MG/DL (ref 8.4–10.2)
CHLORIDE SERPL-SCNC: 99 MMOL/L (ref 96–112)
CO2 SERPL-SCNC: 18 MMOL/L (ref 20–33)
CREAT SERPL-MCNC: 1.61 MG/DL (ref 0.5–1.4)
EKG IMPRESSION: NORMAL
EOSINOPHIL # BLD AUTO: 0 K/UL (ref 0–0.51)
EOSINOPHIL NFR BLD: 0 % (ref 0–6.9)
ERYTHROCYTE [DISTWIDTH] IN BLOOD BY AUTOMATED COUNT: 49.1 FL (ref 35.9–50)
FLUAV RNA SPEC QL NAA+PROBE: POSITIVE
FLUBV RNA SPEC QL NAA+PROBE: NEGATIVE
GFR SERPLBLD CREATININE-BSD FMLA CKD-EPI: 50 ML/MIN/1.73 M 2
GLOBULIN SER CALC-MCNC: 3.3 G/DL (ref 1.9–3.5)
GLUCOSE SERPL-MCNC: 147 MG/DL (ref 65–99)
HCT VFR BLD AUTO: 42.2 % (ref 42–52)
HGB BLD-MCNC: 14.4 G/DL (ref 14–18)
IMM GRANULOCYTES # BLD AUTO: 0.01 K/UL (ref 0–0.11)
IMM GRANULOCYTES NFR BLD AUTO: 0.2 % (ref 0–0.9)
LYMPHOCYTES # BLD AUTO: 3.32 K/UL (ref 1–4.8)
LYMPHOCYTES NFR BLD: 58 % (ref 22–41)
MCH RBC QN AUTO: 29.2 PG (ref 27–33)
MCHC RBC AUTO-ENTMCNC: 34.1 G/DL (ref 32.3–36.5)
MCV RBC AUTO: 85.6 FL (ref 81.4–97.8)
MONOCYTES # BLD AUTO: 0.62 K/UL (ref 0–0.85)
MONOCYTES NFR BLD AUTO: 10.8 % (ref 0–13.4)
NEUTROPHILS # BLD AUTO: 1.76 K/UL (ref 1.82–7.42)
NEUTROPHILS NFR BLD: 30.8 % (ref 44–72)
NRBC # BLD AUTO: 0 K/UL
NRBC BLD-RTO: 0 /100 WBC (ref 0–0.2)
NT-PROBNP SERPL IA-MCNC: 171 PG/ML (ref 0–125)
PLATELET # BLD AUTO: 132 K/UL (ref 164–446)
PMV BLD AUTO: 10.9 FL (ref 9–12.9)
POTASSIUM SERPL-SCNC: 4.3 MMOL/L (ref 3.6–5.5)
PROT SERPL-MCNC: 8 G/DL (ref 6–8.2)
RBC # BLD AUTO: 4.93 M/UL (ref 4.7–6.1)
RSV RNA SPEC QL NAA+PROBE: NEGATIVE
SARS-COV-2 RNA RESP QL NAA+PROBE: NOTDETECTED
SODIUM SERPL-SCNC: 131 MMOL/L (ref 135–145)
SPECIMEN SOURCE: ABNORMAL
TROPONIN T SERPL-MCNC: 15 NG/L (ref 6–19)
TROPONIN T SERPL-MCNC: 18 NG/L (ref 6–19)
WBC # BLD AUTO: 5.7 K/UL (ref 4.8–10.8)

## 2025-02-20 PROCEDURE — 85025 COMPLETE CBC W/AUTO DIFF WBC: CPT

## 2025-02-20 PROCEDURE — 36415 COLL VENOUS BLD VENIPUNCTURE: CPT

## 2025-02-20 PROCEDURE — 80053 COMPREHEN METABOLIC PANEL: CPT

## 2025-02-20 PROCEDURE — 99284 EMERGENCY DEPT VISIT MOD MDM: CPT

## 2025-02-20 PROCEDURE — 93005 ELECTROCARDIOGRAM TRACING: CPT | Mod: TC | Performed by: EMERGENCY MEDICINE

## 2025-02-20 PROCEDURE — 0241U HCHG SARS-COV-2 COVID-19 NFCT DS RESP RNA 4 TRGT MIC: CPT

## 2025-02-20 PROCEDURE — 83880 ASSAY OF NATRIURETIC PEPTIDE: CPT

## 2025-02-20 PROCEDURE — 84484 ASSAY OF TROPONIN QUANT: CPT | Mod: 91

## 2025-02-20 PROCEDURE — 71045 X-RAY EXAM CHEST 1 VIEW: CPT

## 2025-02-20 PROCEDURE — 93005 ELECTROCARDIOGRAM TRACING: CPT | Mod: TC

## 2025-02-20 RX ORDER — IBUPROFEN 800 MG/1
800 TABLET, FILM COATED ORAL EVERY 8 HOURS PRN
Qty: 30 TABLET | Refills: 0 | Status: SHIPPED | OUTPATIENT
Start: 2025-02-20

## 2025-02-20 RX ORDER — ALBUTEROL SULFATE 90 UG/1
2 INHALANT RESPIRATORY (INHALATION) EVERY 6 HOURS PRN
Qty: 8.5 G | Refills: 1 | Status: SHIPPED | OUTPATIENT
Start: 2025-02-20

## 2025-02-20 NOTE — ED TRIAGE NOTES
"Chief Complaint   Patient presents with    Chest Pain     Ongoing 2-3d only when he coughs.   Strong cardiac Hx, s/p 3 stents.     Flu Like Symptoms     Ongoing 2-3d. Associated subjective fevers, chills, rhinorrhea, cough.   Associated dizziness, disorientation, weakness, and bowel incontinence (of the diarrhea variety)     Blood Pressure: (!) 149/78, Pulse: 94, Respiration: (!) 21, Temperature: 37.6 °C (99.7 °F), Height: 172.7 cm (5' 8\"), Weight: 91 kg (200 lb 9.9 oz), BMI (Calculated): 30.5, BSA (Calculated): 2.1, Pulse Oximetry: 93 %, O2 Delivery Device: None - Room Air  "

## 2025-02-20 NOTE — Clinical Note
José Mohr was seen and treated in our emergency department on 2/20/2025.  He may return to work on 02/24/2025.       If you have any questions or concerns, please don't hesitate to call.      Jeramie Ignacio D.O. Spine appears normal, range of motion is not limited, no muscle or joint tenderness

## 2025-02-20 NOTE — ED NOTES
Discharge instructions given and discussed. RX sent to preferred pharmacy given and pt educated. Pt educated to come back to ER for new or worsening symptoms and follow up with PCP as instructed. Pt verbalized understanding. VSS. Pt  Discharged in stable condition. Daughter on her way to pick pt up.

## 2025-02-20 NOTE — Clinical Note
José Mohr was seen and treated in our emergency department on 2/20/2025.  He may return to work on 02/24/2025.       If you have any questions or concerns, please don't hesitate to call.      Jeramie Ignacio D.O.

## 2025-02-20 NOTE — ED PROVIDER NOTES
ED Provider Note    CHIEF COMPLAINT  Chief Complaint   Patient presents with    Chest Pain     Ongoing 2-3d only when he coughs.   Strong cardiac Hx, s/p 3 stents.     Flu Like Symptoms     Ongoing 2-3d. Associated subjective fevers, chills, rhinorrhea, cough.   Associated dizziness, disorientation, weakness, and bowel incontinence (of the diarrhea variety)       EXTERNAL RECORDS REVIEWED  Outpatient Notes   cardiology    HPI/ROS  LIMITATION TO HISTORY   None  OUTSIDE HISTORIAN(S):  Family.  States patient has had flulike symptoms over the last couple days.    José Mohr is a 54 y.o. male who presents here for evaluation of chest heaviness, cough, and general body aches.  This been ongoing for about 3 days.  Patient states that today he became lightheaded and dizzy with his chest pain, which prompted him to have a near syncopal event.  He did not strike his head.  However he did have to sit back down quickly.  He has no vomiting, back pain, or neck pain.    PAST MEDICAL HISTORY   has a past medical history of Breath shortness, CAD (coronary artery disease), Cancer ([facility]) (2012), Dental disorder (11/15/2022), Diabetes ([facility]), Hyperlipidemia, and Myocardial infarct ([facility]) (10/03/2022).    SURGICAL HISTORY   has a past surgical history that includes other (2012); other orthopedic surgery (2014); zzz cardiac cath; and angioplasty.    FAMILY HISTORY  Family History   Problem Relation Age of Onset    Heart Disease Maternal Grandmother     Diabetes Maternal Grandmother     Heart Attack Neg Hx        SOCIAL HISTORY  Social History     Tobacco Use    Smoking status: Former     Current packs/day: 1.00     Average packs/day: 1 pack/day for 40.0 years (40.0 ttl pk-yrs)     Types: Cigarettes    Smokeless tobacco: Former   Vaping Use    Vaping status: Every Day    Substances: Nicotine   Substance and Sexual Activity    Alcohol use: Not Currently     Comment: rare    Drug use: Never    Sexual activity: Not on file  "      CURRENT MEDICATIONS  Home Medications       Reviewed by Nicola Garcia R.N. (Registered Nurse) on 25 at 1030  Med List Status: Not Addressed     Medication Last Dose Status   aspirin 81 MG EC tablet  Active   atorvastatin (LIPITOR) 80 MG tablet  Active   carvedilol (COREG) 12.5 MG Tab  Active   Fenofibrate 134 MG Cap  Active   JANUVIA 100 MG Tab  Active   lisinopril (PRINIVIL) 5 MG Tab  Active   metFORMIN (GLUCOPHAGE) 500 MG Tab  Active                    ALLERGIES  No Known Allergies    PHYSICAL EXAM  VITAL SIGNS: BP (!) 149/78   Pulse 94   Temp 37.6 °C (99.7 °F) (Temporal)   Resp (!) 21   Ht 1.727 m (5' 8\")   Wt 91 kg (200 lb 9.9 oz)   SpO2 93%   BMI 30.50 kg/m²    Constitutional: Well developed, well nourished. No acute distress.  HEENT: Normocephalic, atraumatic. Posterior pharynx clear and moist.  Eyes:  EOMI. Normal sclera.  Neck: Supple, Full range of motion, nontender.  Chest/Pulmonary: diminished breath sounds, equal expansion  Cardio: Regular rate and rhythm with no murmur.   Abdomen: Soft, nontender. No peritoneal signs. No guarding. No palpable masses.  Back: No CVA tenderness, nontender midline, no step offs.  Musculoskeletal: No deformity, no edema, neurovascular intact.   Neuro: Clear speech, appropriate, cooperative, cranial nerves II-XII grossly intact.  Psych: Normal mood and affect    EKG; normal sinus rhythm at 91.  No ST elevation, no ST depression.  QTc is 416.  Compared to EKG from 2024.    EKG/LABS  Results for orders placed or performed during the hospital encounter of 25   EKG    Collection Time: 25 10:37 AM   Result Value Ref Range    Report       [facility] Emergency Dept.    Test Date:  2025  Pt Name:    BARBARA SCOTT               Department: EDS  MRN:        [id number]                      Room:  Gender:     Male                         Technician: [id number]  :        1970                   Requested By:ER " TRIAGE PROTOCOL  Order #:    [id number]                    Reading MD:    Measurements  Intervals                                Axis  Rate:       91                           P:          69  SD:         152                          QRS:        62  QRSD:       97                           T:          29  QT:         338  QTc:        416    Interpretive Statements  Sinus rhythm  Baseline wander in lead(s) V2  Compared to ECG 07/30/2024 10:16:29  No significant changes     CBC with Differential    Collection Time: 02/20/25 10:46 AM   Result Value Ref Range    WBC 5.7 4.8 - 10.8 K/uL    RBC 4.93 4.70 - 6.10 M/uL    Hemoglobin 14.4 14.0 - 18.0 g/dL    Hematocrit 42.2 42.0 - 52.0 %    MCV 85.6 81.4 - 97.8 fL    MCH 29.2 27.0 - 33.0 pg    MCHC 34.1 32.3 - 36.5 g/dL    RDW 49.1 35.9 - 50.0 fL    Platelet Count 132 (L) 164 - 446 K/uL    MPV 10.9 9.0 - 12.9 fL    Neutrophils-Polys 30.80 (L) 44.00 - 72.00 %    Lymphocytes 58.00 (H) 22.00 - 41.00 %    Monocytes 10.80 0.00 - 13.40 %    Eosinophils 0.00 0.00 - 6.90 %    Basophils 0.20 0.00 - 1.80 %    Immature Granulocytes 0.20 0.00 - 0.90 %    Nucleated RBC 0.00 0.00 - 0.20 /100 WBC    Neutrophils (Absolute) 1.76 (L) 1.82 - 7.42 K/uL    Lymphs (Absolute) 3.32 1.00 - 4.80 K/uL    Monos (Absolute) 0.62 0.00 - 0.85 K/uL    Eos (Absolute) 0.00 0.00 - 0.51 K/uL    Baso (Absolute) 0.01 0.00 - 0.12 K/uL    Immature Granulocytes (abs) 0.01 0.00 - 0.11 K/uL    NRBC (Absolute) 0.00 K/uL   Complete Metabolic Panel (CMP)    Collection Time: 02/20/25 10:46 AM   Result Value Ref Range    Sodium 131 (L) 135 - 145 mmol/L    Potassium 4.3 3.6 - 5.5 mmol/L    Chloride 99 96 - 112 mmol/L    Co2 18 (L) 20 - 33 mmol/L    Anion Gap 14.0 7.0 - 16.0    Glucose 147 (H) 65 - 99 mg/dL    Bun 26 (H) 8 - 22 mg/dL    Creatinine 1.61 (H) 0.50 - 1.40 mg/dL    Calcium 9.3 8.4 - 10.2 mg/dL    Correct Calcium 8.7 8.5 - 10.5 mg/dL    AST(SGOT) 48 (H) 12 - 45 U/L    ALT(SGPT) 17 2 - 50 U/L    Alkaline Phosphatase  55 30 - 99 U/L    Total Bilirubin 0.9 0.1 - 1.5 mg/dL    Albumin 4.7 3.2 - 4.9 g/dL    Total Protein 8.0 6.0 - 8.2 g/dL    Globulin 3.3 1.9 - 3.5 g/dL    A-G Ratio 1.4 g/dL   proBrain Natriuretic Peptide, NT (BNP)    Collection Time: 02/20/25 10:46 AM   Result Value Ref Range    NT-proBNP 171 (H) 0 - 125 pg/mL   Troponins NOW    Collection Time: 02/20/25 10:46 AM   Result Value Ref Range    Troponin T 18 6 - 19 ng/L   CoV-2, FLU A/B, and RSV by PCR (2-4 Hours CEPHEID) : Collect NP swab in VTM    Collection Time: 02/20/25 10:46 AM    Specimen: Respirate   Result Value Ref Range    Influenza virus A RNA POSITIVE (A) Negative    Influenza virus B, PCR Negative Negative    RSV, PCR Negative Negative    SARS-CoV-2 by PCR NotDetected     SARS-CoV-2 Source NP Swab    ESTIMATED GFR    Collection Time: 02/20/25 10:46 AM   Result Value Ref Range    GFR (CKD-EPI) 50 (A) >60 mL/min/1.73 m 2         RADIOLOGY/PROCEDURES   I have independently interpreted the diagnostic imaging associated with this visit and am waiting the final reading from the radiologist.   My preliminary interpretation is as follows: below     Radiologist interpretation:  DX-CHEST-PORTABLE (1 VIEW)   Final Result      No evidence of acute cardiopulmonary process.          COURSE & MEDICAL DECISION MAKING    ASSESSMENT, COURSE AND PLAN  Care Narrative: This is a 54-year-old male here for evaluation of cough congestion and bodyaches.  Patient is flu positive.  He is unremarkable cardiac workup, and has a heart score of 3.  Patient will be discharged home, and will follow-up as outpatient.        DISPOSITION AND DISCUSSIONS  I have discussed management of the patient with the following physicians and BLAINE's: None    Discussion of management with other QHP or appropriate source(s): none     Escalation of care considered, and ultimately not performed:  none    Barriers to care at this time, including but not limited to: none.     Decision tools and prescription  drugs considered including, but not limited to: none.    FINAL DIAGNOSIS  Influenza A     Electronically signed by: Jeramie Ignacio D.O., 2/20/2025 10:46 AM

## 2025-03-01 ENCOUNTER — APPOINTMENT (OUTPATIENT)
Dept: RADIOLOGY | Facility: MEDICAL CENTER | Age: 55
DRG: 871 | End: 2025-03-01
Attending: STUDENT IN AN ORGANIZED HEALTH CARE EDUCATION/TRAINING PROGRAM
Payer: COMMERCIAL

## 2025-03-01 ENCOUNTER — HOSPITAL ENCOUNTER (INPATIENT)
Facility: MEDICAL CENTER | Age: 55
LOS: 7 days | DRG: 871 | End: 2025-03-08
Attending: STUDENT IN AN ORGANIZED HEALTH CARE EDUCATION/TRAINING PROGRAM | Admitting: INTERNAL MEDICINE
Payer: COMMERCIAL

## 2025-03-01 DIAGNOSIS — E87.8 HYPOCHLOREMIA: ICD-10-CM
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
## 2025-03-01 DIAGNOSIS — R79.89 ELEVATED FERRITIN: ICD-10-CM

## 2025-03-01 DIAGNOSIS — D70.9 NEUTROPENIC FEVER (HCC): ICD-10-CM

## 2025-03-01 DIAGNOSIS — J18.9 COMMUNITY ACQUIRED PNEUMONIA OF RIGHT MIDDLE LOBE OF LUNG: ICD-10-CM

## 2025-03-01 DIAGNOSIS — G92.8 TOXIC METABOLIC ENCEPHALOPATHY: ICD-10-CM

## 2025-03-01 DIAGNOSIS — Z85.828 HISTORY OF BASAL CELL CARCINOMA: Chronic | ICD-10-CM

## 2025-03-01 DIAGNOSIS — R16.1 SPLENOMEGALY: ICD-10-CM

## 2025-03-01 DIAGNOSIS — E11.40 TYPE 2 DIABETES MELLITUS WITH DIABETIC NEUROPATHY, WITHOUT LONG-TERM CURRENT USE OF INSULIN (HCC): ICD-10-CM

## 2025-03-01 DIAGNOSIS — I21.11 STEMI INVOLVING RIGHT CORONARY ARTERY (HCC): ICD-10-CM

## 2025-03-01 DIAGNOSIS — R41.0 CONFUSION: ICD-10-CM

## 2025-03-01 DIAGNOSIS — R65.10 SIRS (SYSTEMIC INFLAMMATORY RESPONSE SYNDROME) (HCC): ICD-10-CM

## 2025-03-01 DIAGNOSIS — R50.81 NEUTROPENIC FEVER (HCC): ICD-10-CM

## 2025-03-01 DIAGNOSIS — E87.1 HYPONATREMIA: ICD-10-CM

## 2025-03-01 DIAGNOSIS — E78.5 DYSLIPIDEMIA: ICD-10-CM
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
## 2025-03-01 DIAGNOSIS — D61.818 PANCYTOPENIA (HCC): ICD-10-CM

## 2025-03-01 PROBLEM — A41.9 SEPSIS (HCC): Status: ACTIVE | Noted: 2025-03-01

## 2025-03-01 PROBLEM — R19.7 DIARRHEA: Status: ACTIVE | Noted: 2025-03-01

## 2025-03-01 LAB
ABO + RH BLD: NORMAL
ABO GROUP BLD: NORMAL
ALBUMIN SERPL BCP-MCNC: 3.4 G/DL (ref 3.2–4.9)
ALBUMIN/GLOB SERPL: 0.9 G/DL
ALP SERPL-CCNC: 42 U/L (ref 30–99)
ALT SERPL-CCNC: 37 U/L (ref 2–50)
ANION GAP SERPL CALC-SCNC: 14 MMOL/L (ref 7–16)
ANISOCYTOSIS BLD QL SMEAR: ABNORMAL
APTT PPP: 28.6 SEC (ref 24.7–36)
AST SERPL-CCNC: 67 U/L (ref 12–45)
BASOPHILS # BLD AUTO: 0.9 % (ref 0–1.8)
BASOPHILS # BLD: 0.02 K/UL (ref 0–0.12)
BILIRUB SERPL-MCNC: 1.1 MG/DL (ref 0.1–1.5)
BLD GP AB SCN SERPL QL: NORMAL
BUN SERPL-MCNC: 15 MG/DL (ref 8–22)
CALCIUM ALBUM COR SERPL-MCNC: 9.2 MG/DL (ref 8.5–10.5)
CALCIUM SERPL-MCNC: 8.7 MG/DL (ref 8.4–10.2)
CHLORIDE SERPL-SCNC: 88 MMOL/L (ref 96–112)
CO2 SERPL-SCNC: 16 MMOL/L (ref 20–33)
CREAT SERPL-MCNC: 0.99 MG/DL (ref 0.5–1.4)
EKG IMPRESSION: NORMAL
EOSINOPHIL # BLD AUTO: 0 K/UL (ref 0–0.51)
EOSINOPHIL NFR BLD: 0 % (ref 0–6.9)
ERYTHROCYTE [DISTWIDTH] IN BLOOD BY AUTOMATED COUNT: 44.3 FL (ref 35.9–50)
FLUAV RNA SPEC QL NAA+PROBE: NEGATIVE
FLUBV RNA SPEC QL NAA+PROBE: NEGATIVE
GFR SERPLBLD CREATININE-BSD FMLA CKD-EPI: 89 ML/MIN/1.73 M 2
GLOBULIN SER CALC-MCNC: 3.9 G/DL (ref 1.9–3.5)
GLUCOSE SERPL-MCNC: 113 MG/DL (ref 65–99)
HCT VFR BLD AUTO: 33.1 % (ref 42–52)
HGB BLD-MCNC: 11.6 G/DL (ref 14–18)
INR PPP: 1.23 (ref 0.87–1.13)
LACTATE SERPL-SCNC: 1.4 MMOL/L (ref 0.5–2)
LYMPHOCYTES # BLD AUTO: 1.41 K/UL (ref 1–4.8)
LYMPHOCYTES NFR BLD: 58.8 % (ref 22–41)
MANUAL DIFF BLD: NORMAL
MCH RBC QN AUTO: 28.4 PG (ref 27–33)
MCHC RBC AUTO-ENTMCNC: 35 G/DL (ref 32.3–36.5)
MCV RBC AUTO: 81.1 FL (ref 81.4–97.8)
MICROCYTES BLD QL SMEAR: ABNORMAL
MONOCYTES # BLD AUTO: 0.46 K/UL (ref 0–0.85)
MONOCYTES NFR BLD AUTO: 19.3 % (ref 0–13.4)
NEUTROPHILS # BLD AUTO: 0.5 K/UL (ref 1.82–7.42)
NEUTROPHILS NFR BLD: 21 % (ref 44–72)
NRBC # BLD AUTO: 0 K/UL
NRBC BLD-RTO: 0 /100 WBC (ref 0–0.2)
NT-PROBNP SERPL IA-MCNC: 237 PG/ML (ref 0–125)
PLATELET # BLD AUTO: 159 K/UL (ref 164–446)
PLATELET BLD QL SMEAR: NORMAL
PMV BLD AUTO: 11 FL (ref 9–12.9)
POTASSIUM SERPL-SCNC: 4.3 MMOL/L (ref 3.6–5.5)
PROT SERPL-MCNC: 7.3 G/DL (ref 6–8.2)
PROTHROMBIN TIME: 15.9 SEC (ref 12–14.6)
RBC # BLD AUTO: 4.08 M/UL (ref 4.7–6.1)
RBC BLD AUTO: PRESENT
RH BLD: NORMAL
RSV RNA SPEC QL NAA+PROBE: NEGATIVE
SARS-COV-2 RNA RESP QL NAA+PROBE: NOTDETECTED
SODIUM SERPL-SCNC: 118 MMOL/L (ref 135–145)
SPECIMEN SOURCE: NORMAL
TROPONIN T SERPL-MCNC: 19 NG/L (ref 6–19)
TSH SERPL DL<=0.005 MIU/L-ACNC: 1.23 UIU/ML (ref 0.38–5.33)
WBC # BLD AUTO: 2.4 K/UL (ref 4.8–10.8)

## 2025-03-01 PROCEDURE — 86850 RBC ANTIBODY SCREEN: CPT

## 2025-03-01 PROCEDURE — 85610 PROTHROMBIN TIME: CPT

## 2025-03-01 PROCEDURE — 87389 HIV-1 AG W/HIV-1&-2 AB AG IA: CPT

## 2025-03-01 PROCEDURE — 80053 COMPREHEN METABOLIC PANEL: CPT

## 2025-03-01 PROCEDURE — 700105 HCHG RX REV CODE 258: Performed by: INTERNAL MEDICINE

## 2025-03-01 PROCEDURE — 85730 THROMBOPLASTIN TIME PARTIAL: CPT

## 2025-03-01 PROCEDURE — 94760 N-INVAS EAR/PLS OXIMETRY 1: CPT

## 2025-03-01 PROCEDURE — 82570 ASSAY OF URINE CREATININE: CPT

## 2025-03-01 PROCEDURE — 70450 CT HEAD/BRAIN W/O DYE: CPT

## 2025-03-01 PROCEDURE — A9270 NON-COVERED ITEM OR SERVICE: HCPCS | Performed by: STUDENT IN AN ORGANIZED HEALTH CARE EDUCATION/TRAINING PROGRAM

## 2025-03-01 PROCEDURE — 83880 ASSAY OF NATRIURETIC PEPTIDE: CPT

## 2025-03-01 PROCEDURE — 71045 X-RAY EXAM CHEST 1 VIEW: CPT

## 2025-03-01 PROCEDURE — 84300 ASSAY OF URINE SODIUM: CPT

## 2025-03-01 PROCEDURE — 700102 HCHG RX REV CODE 250 W/ 637 OVERRIDE(OP): Performed by: STUDENT IN AN ORGANIZED HEALTH CARE EDUCATION/TRAINING PROGRAM

## 2025-03-01 PROCEDURE — 770020 HCHG ROOM/CARE - TELE (206)

## 2025-03-01 PROCEDURE — 81001 URINALYSIS AUTO W/SCOPE: CPT

## 2025-03-01 PROCEDURE — 99223 1ST HOSP IP/OBS HIGH 75: CPT | Performed by: INTERNAL MEDICINE

## 2025-03-01 PROCEDURE — 93005 ELECTROCARDIOGRAM TRACING: CPT | Mod: TC | Performed by: STUDENT IN AN ORGANIZED HEALTH CARE EDUCATION/TRAINING PROGRAM

## 2025-03-01 PROCEDURE — 86900 BLOOD TYPING SEROLOGIC ABO: CPT

## 2025-03-01 PROCEDURE — 86901 BLOOD TYPING SEROLOGIC RH(D): CPT

## 2025-03-01 PROCEDURE — 84443 ASSAY THYROID STIM HORMONE: CPT

## 2025-03-01 PROCEDURE — 96365 THER/PROPH/DIAG IV INF INIT: CPT

## 2025-03-01 PROCEDURE — 99285 EMERGENCY DEPT VISIT HI MDM: CPT

## 2025-03-01 PROCEDURE — 36415 COLL VENOUS BLD VENIPUNCTURE: CPT

## 2025-03-01 PROCEDURE — 84484 ASSAY OF TROPONIN QUANT: CPT

## 2025-03-01 PROCEDURE — 87040 BLOOD CULTURE FOR BACTERIA: CPT

## 2025-03-01 PROCEDURE — 85027 COMPLETE CBC AUTOMATED: CPT

## 2025-03-01 PROCEDURE — A9270 NON-COVERED ITEM OR SERVICE: HCPCS | Performed by: INTERNAL MEDICINE

## 2025-03-01 PROCEDURE — 83605 ASSAY OF LACTIC ACID: CPT

## 2025-03-01 PROCEDURE — 700102 HCHG RX REV CODE 250 W/ 637 OVERRIDE(OP): Performed by: INTERNAL MEDICINE

## 2025-03-01 PROCEDURE — 700105 HCHG RX REV CODE 258: Performed by: STUDENT IN AN ORGANIZED HEALTH CARE EDUCATION/TRAINING PROGRAM

## 2025-03-01 PROCEDURE — 85007 BL SMEAR W/DIFF WBC COUNT: CPT

## 2025-03-01 PROCEDURE — 700111 HCHG RX REV CODE 636 W/ 250 OVERRIDE (IP): Mod: JZ | Performed by: STUDENT IN AN ORGANIZED HEALTH CARE EDUCATION/TRAINING PROGRAM

## 2025-03-01 PROCEDURE — 0241U HCHG SARS-COV-2 COVID-19 NFCT DS RESP RNA 4 TRGT MIC: CPT

## 2025-03-01 RX ORDER — PROMETHAZINE HYDROCHLORIDE 25 MG/1
12.5-25 TABLET ORAL EVERY 4 HOURS PRN
Status: DISCONTINUED | OUTPATIENT
Start: 2025-03-01 | End: 2025-03-08 | Stop reason: HOSPADM

## 2025-03-01 RX ORDER — ATORVASTATIN CALCIUM 40 MG/1
80 TABLET, FILM COATED ORAL EVERY EVENING
Status: DISCONTINUED | OUTPATIENT
Start: 2025-03-02 | End: 2025-03-08 | Stop reason: HOSPADM

## 2025-03-01 RX ORDER — ACETAMINOPHEN 500 MG
1000 TABLET ORAL ONCE
Status: COMPLETED | OUTPATIENT
Start: 2025-03-01 | End: 2025-03-01

## 2025-03-01 RX ORDER — ACETAMINOPHEN 500 MG
1000 TABLET ORAL EVERY 6 HOURS PRN
Status: DISCONTINUED | OUTPATIENT
Start: 2025-03-01 | End: 2025-03-08 | Stop reason: HOSPADM

## 2025-03-01 RX ORDER — ONDANSETRON 4 MG/1
4 TABLET, ORALLY DISINTEGRATING ORAL EVERY 4 HOURS PRN
Status: DISCONTINUED | OUTPATIENT
Start: 2025-03-01 | End: 2025-03-08 | Stop reason: HOSPADM

## 2025-03-01 RX ORDER — IBUPROFEN 400 MG/1
800 TABLET, FILM COATED ORAL EVERY 6 HOURS PRN
Status: DISCONTINUED | OUTPATIENT
Start: 2025-03-01 | End: 2025-03-07

## 2025-03-01 RX ORDER — DEXTROSE MONOHYDRATE 25 G/50ML
25 INJECTION, SOLUTION INTRAVENOUS
Status: DISCONTINUED | OUTPATIENT
Start: 2025-03-01 | End: 2025-03-08 | Stop reason: HOSPADM

## 2025-03-01 RX ORDER — LISINOPRIL 5 MG/1
5 TABLET ORAL DAILY
Status: DISCONTINUED | OUTPATIENT
Start: 2025-03-02 | End: 2025-03-08 | Stop reason: HOSPADM

## 2025-03-01 RX ORDER — PROCHLORPERAZINE EDISYLATE 5 MG/ML
5-10 INJECTION INTRAMUSCULAR; INTRAVENOUS EVERY 4 HOURS PRN
Status: DISCONTINUED | OUTPATIENT
Start: 2025-03-01 | End: 2025-03-08 | Stop reason: HOSPADM

## 2025-03-01 RX ORDER — CARVEDILOL 6.25 MG/1
12.5 TABLET ORAL 2 TIMES DAILY WITH MEALS
Status: DISCONTINUED | OUTPATIENT
Start: 2025-03-02 | End: 2025-03-08 | Stop reason: HOSPADM

## 2025-03-01 RX ORDER — INSULIN LISPRO 100 [IU]/ML
1-6 INJECTION, SOLUTION INTRAVENOUS; SUBCUTANEOUS
Status: DISCONTINUED | OUTPATIENT
Start: 2025-03-02 | End: 2025-03-08 | Stop reason: HOSPADM

## 2025-03-01 RX ORDER — ASPIRIN 81 MG/1
81 TABLET ORAL DAILY
Status: DISCONTINUED | OUTPATIENT
Start: 2025-03-02 | End: 2025-03-08 | Stop reason: HOSPADM

## 2025-03-01 RX ORDER — FENOFIBRATE 134 MG/1
134 CAPSULE ORAL DAILY
Status: DISCONTINUED | OUTPATIENT
Start: 2025-03-02 | End: 2025-03-08 | Stop reason: HOSPADM

## 2025-03-01 RX ORDER — DOXYCYCLINE 100 MG/1
100 TABLET ORAL ONCE
Status: COMPLETED | OUTPATIENT
Start: 2025-03-01 | End: 2025-03-01

## 2025-03-01 RX ORDER — SODIUM CHLORIDE 9 MG/ML
INJECTION, SOLUTION INTRAVENOUS CONTINUOUS
Status: DISCONTINUED | OUTPATIENT
Start: 2025-03-01 | End: 2025-03-02

## 2025-03-01 RX ORDER — ENOXAPARIN SODIUM 100 MG/ML
40 INJECTION SUBCUTANEOUS DAILY
Status: DISCONTINUED | OUTPATIENT
Start: 2025-03-02 | End: 2025-03-08 | Stop reason: HOSPADM

## 2025-03-01 RX ORDER — LINEZOLID 600 MG/1
600 TABLET, FILM COATED ORAL EVERY 12 HOURS
Status: DISCONTINUED | OUTPATIENT
Start: 2025-03-01 | End: 2025-03-02

## 2025-03-01 RX ORDER — PROMETHAZINE HYDROCHLORIDE 25 MG/1
12.5-25 SUPPOSITORY RECTAL EVERY 4 HOURS PRN
Status: DISCONTINUED | OUTPATIENT
Start: 2025-03-01 | End: 2025-03-08 | Stop reason: HOSPADM

## 2025-03-01 RX ORDER — ONDANSETRON 2 MG/ML
4 INJECTION INTRAMUSCULAR; INTRAVENOUS EVERY 4 HOURS PRN
Status: DISCONTINUED | OUTPATIENT
Start: 2025-03-01 | End: 2025-03-08 | Stop reason: HOSPADM

## 2025-03-01 RX ORDER — CEFTRIAXONE 2 G/1
2000 INJECTION, POWDER, FOR SOLUTION INTRAMUSCULAR; INTRAVENOUS ONCE
Status: DISCONTINUED | OUTPATIENT
Start: 2025-03-01 | End: 2025-03-01

## 2025-03-01 RX ADMIN — ACETAMINOPHEN 1000 MG: 500 TABLET ORAL at 22:30

## 2025-03-01 RX ADMIN — LINEZOLID 600 MG: 600 TABLET, FILM COATED ORAL at 23:27

## 2025-03-01 RX ADMIN — SODIUM CHLORIDE: 9 INJECTION, SOLUTION INTRAVENOUS at 22:44

## 2025-03-01 RX ADMIN — CEFEPIME 2 G: 2 INJECTION, POWDER, FOR SOLUTION INTRAVENOUS at 22:38

## 2025-03-01 RX ADMIN — DOXYCYCLINE 100 MG: 100 TABLET, FILM COATED ORAL at 21:56

## 2025-03-01 ASSESSMENT — COGNITIVE AND FUNCTIONAL STATUS - GENERAL
WALKING IN HOSPITAL ROOM: A LITTLE
MOBILITY SCORE: 22
MOVING TO AND FROM BED TO CHAIR: A LITTLE
SUGGESTED CMS G CODE MODIFIER DAILY ACTIVITY: CH
SUGGESTED CMS G CODE MODIFIER MOBILITY: CJ
DAILY ACTIVITIY SCORE: 24

## 2025-03-01 ASSESSMENT — ENCOUNTER SYMPTOMS
TINGLING: 1
FALLS: 1
DIZZINESS: 1
NAUSEA: 0
SPUTUM PRODUCTION: 1
LOSS OF CONSCIOUSNESS: 1
BLOOD IN STOOL: 0
VOMITING: 1
SHORTNESS OF BREATH: 1
WEAKNESS: 1
CHILLS: 1
ABDOMINAL PAIN: 0
MYALGIAS: 0
DIARRHEA: 1
MEMORY LOSS: 1
FEVER: 1
HEMOPTYSIS: 0
COUGH: 1
DIAPHORESIS: 1
BACK PAIN: 0

## 2025-03-01 ASSESSMENT — LIFESTYLE VARIABLES
TOTAL SCORE: 0
EVER HAD A DRINK FIRST THING IN THE MORNING TO STEADY YOUR NERVES TO GET RID OF A HANGOVER: NO
DOES PATIENT WANT TO STOP DRINKING: NO
ALCOHOL_USE: NO
AVERAGE NUMBER OF DAYS PER WEEK YOU HAVE A DRINK CONTAINING ALCOHOL: 0
EVER FELT BAD OR GUILTY ABOUT YOUR DRINKING: NO
CONSUMPTION TOTAL: NEGATIVE
ON A TYPICAL DAY WHEN YOU DRINK ALCOHOL HOW MANY DRINKS DO YOU HAVE: 0
TOTAL SCORE: 0
HAVE YOU EVER FELT YOU SHOULD CUT DOWN ON YOUR DRINKING: NO
HAVE PEOPLE ANNOYED YOU BY CRITICIZING YOUR DRINKING: NO
HOW MANY TIMES IN THE PAST YEAR HAVE YOU HAD 5 OR MORE DRINKS IN A DAY: 0
TOTAL SCORE: 0

## 2025-03-01 ASSESSMENT — SOCIAL DETERMINANTS OF HEALTH (SDOH)
WITHIN THE LAST YEAR, HAVE YOU BEEN HUMILIATED OR EMOTIONALLY ABUSED IN OTHER WAYS BY YOUR PARTNER OR EX-PARTNER?: NO
WITHIN THE LAST YEAR, HAVE TO BEEN RAPED OR FORCED TO HAVE ANY KIND OF SEXUAL ACTIVITY BY YOUR PARTNER OR EX-PARTNER?: NO
WITHIN THE LAST YEAR, HAVE YOU BEEN KICKED, HIT, SLAPPED, OR OTHERWISE PHYSICALLY HURT BY YOUR PARTNER OR EX-PARTNER?: NO
WITHIN THE PAST 12 MONTHS, YOU WORRIED THAT YOUR FOOD WOULD RUN OUT BEFORE YOU GOT THE MONEY TO BUY MORE: NEVER TRUE
WITHIN THE LAST YEAR, HAVE YOU BEEN AFRAID OF YOUR PARTNER OR EX-PARTNER?: NO
WITHIN THE PAST 12 MONTHS, THE FOOD YOU BOUGHT JUST DIDN'T LAST AND YOU DIDN'T HAVE MONEY TO GET MORE: NEVER TRUE
IN THE PAST 12 MONTHS, HAS THE ELECTRIC, GAS, OIL, OR WATER COMPANY THREATENED TO SHUT OFF SERVICE IN YOUR HOME?: NO

## 2025-03-01 ASSESSMENT — PATIENT HEALTH QUESTIONNAIRE - PHQ9
9. THOUGHTS THAT YOU WOULD BE BETTER OFF DEAD, OR OF HURTING YOURSELF: NOT AT ALL
SUM OF ALL RESPONSES TO PHQ QUESTIONS 1-9: 7
7. TROUBLE CONCENTRATING ON THINGS, SUCH AS READING THE NEWSPAPER OR WATCHING TELEVISION: NOT AT ALL
8. MOVING OR SPEAKING SO SLOWLY THAT OTHER PEOPLE COULD HAVE NOTICED. OR THE OPPOSITE, BEING SO FIGETY OR RESTLESS THAT YOU HAVE BEEN MOVING AROUND A LOT MORE THAN USUAL: SEVERAL DAYS
1. LITTLE INTEREST OR PLEASURE IN DOING THINGS: MORE THAN HALF THE DAYS
6. FEELING BAD ABOUT YOURSELF - OR THAT YOU ARE A FAILURE OR HAVE LET YOURSELF OR YOUR FAMILY DOWN: NOT AL ALL
3. TROUBLE FALLING OR STAYING ASLEEP OR SLEEPING TOO MUCH: NOT AT ALL
5. POOR APPETITE OR OVEREATING: MORE THAN HALF THE DAYS
2. FEELING DOWN, DEPRESSED, IRRITABLE, OR HOPELESS: NOT AT ALL
4. FEELING TIRED OR HAVING LITTLE ENERGY: MORE THAN HALF THE DAYS
SUM OF ALL RESPONSES TO PHQ9 QUESTIONS 1 AND 2: 2

## 2025-03-01 ASSESSMENT — FIBROSIS 4 INDEX
FIB4 SCORE: 4.94
FIB4 SCORE: 3.88

## 2025-03-01 ASSESSMENT — COPD QUESTIONNAIRES
DURING THE PAST 4 WEEKS HOW MUCH DID YOU FEEL SHORT OF BREATH: NONE/LITTLE OF THE TIME
DO YOU EVER COUGH UP ANY MUCUS OR PHLEGM?: NO/ONLY WITH OCCASIONAL COLDS OR INFECTIONS
HAVE YOU SMOKED AT LEAST 100 CIGARETTES IN YOUR ENTIRE LIFE: YES
COPD SCREENING SCORE: 3

## 2025-03-01 ASSESSMENT — PAIN DESCRIPTION - PAIN TYPE: TYPE: ACUTE PAIN
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
## 2025-03-02 ENCOUNTER — APPOINTMENT (OUTPATIENT)
Dept: RADIOLOGY | Facility: MEDICAL CENTER | Age: 55
DRG: 871 | End: 2025-03-02
Attending: INTERNAL MEDICINE
Payer: COMMERCIAL

## 2025-03-02 PROBLEM — J18.9 COMMUNITY ACQUIRED PNEUMONIA OF RIGHT MIDDLE LOBE OF LUNG: Status: ACTIVE | Noted: 2025-03-02
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
## 2025-03-02 PROBLEM — R16.1 SPLENOMEGALY: Status: ACTIVE | Noted: 2025-03-02

## 2025-03-02 PROBLEM — Z85.828 HISTORY OF BASAL CELL CARCINOMA: Chronic | Status: ACTIVE | Noted: 2025-03-02

## 2025-03-02 PROBLEM — R50.81 NEUTROPENIC FEVER (HCC): Status: ACTIVE | Noted: 2025-03-02

## 2025-03-02 PROBLEM — D70.9 NEUTROPENIC FEVER (HCC): Status: ACTIVE | Noted: 2025-03-02

## 2025-03-02 LAB
ADV 40+41 DNA STL QL NAA+NON-PROBE: NOT DETECTED
ANION GAP SERPL CALC-SCNC: 12 MMOL/L (ref 7–16)
ANION GAP SERPL CALC-SCNC: 12 MMOL/L (ref 7–16)
ANION GAP SERPL CALC-SCNC: 15 MMOL/L (ref 7–16)
ANISOCYTOSIS BLD QL SMEAR: ABNORMAL
APPEARANCE UR: ABNORMAL
ASTRO TYP 1-8 RNA STL QL NAA+NON-PROBE: NOT DETECTED
B PARAP IS1001 DNA NPH QL NAA+NON-PROBE: NOT DETECTED
B PERT.PT PRMT NPH QL NAA+NON-PROBE: NOT DETECTED
BACTERIA #/AREA URNS HPF: ABNORMAL /HPF
BASOPHILS # BLD AUTO: 0 % (ref 0–1.8)
BASOPHILS # BLD: 0 K/UL (ref 0–0.12)
BILIRUB UR QL STRIP.AUTO: NEGATIVE
BUN SERPL-MCNC: 11 MG/DL (ref 8–22)
BUN SERPL-MCNC: 13 MG/DL (ref 8–22)
BUN SERPL-MCNC: 14 MG/DL (ref 8–22)
C CAYETANENSIS DNA STL QL NAA+NON-PROBE: NOT DETECTED
C COLI+JEJ+UPSA DNA STL QL NAA+NON-PROBE: NOT DETECTED
C DIFF TOX GENS STL QL NAA+PROBE: NEGATIVE
C PNEUM DNA NPH QL NAA+NON-PROBE: NOT DETECTED
CALCIUM SERPL-MCNC: 8.3 MG/DL (ref 8.4–10.2)
CALCIUM SERPL-MCNC: 8.6 MG/DL (ref 8.4–10.2)
CALCIUM SERPL-MCNC: 8.8 MG/DL (ref 8.4–10.2)
CASTS URNS QL MICRO: ABNORMAL /LPF (ref 0–2)
CHLORIDE SERPL-SCNC: 93 MMOL/L (ref 96–112)
CHLORIDE SERPL-SCNC: 95 MMOL/L (ref 96–112)
CHLORIDE SERPL-SCNC: 96 MMOL/L (ref 96–112)
CO2 SERPL-SCNC: 14 MMOL/L (ref 20–33)
CO2 SERPL-SCNC: 16 MMOL/L (ref 20–33)
CO2 SERPL-SCNC: 16 MMOL/L (ref 20–33)
COLOR UR: ABNORMAL
COMMENT NL1176: NORMAL
CREAT SERPL-MCNC: 0.82 MG/DL (ref 0.5–1.4)
CREAT SERPL-MCNC: 0.89 MG/DL (ref 0.5–1.4)
CREAT SERPL-MCNC: 0.96 MG/DL (ref 0.5–1.4)
CREAT UR-MCNC: 185 MG/DL
CRYPTOSP DNA STL QL NAA+NON-PROBE: NOT DETECTED
E HISTOLYT DNA STL QL NAA+NON-PROBE: NOT DETECTED
EAEC PAA PLAS AGGR+AATA ST NAA+NON-PRB: NOT DETECTED
EC STX1+STX2 GENES STL QL NAA+NON-PROBE: NOT DETECTED
EOSINOPHIL # BLD AUTO: 0 K/UL (ref 0–0.51)
EOSINOPHIL NFR BLD: 0 % (ref 0–6.9)
EPEC EAE GENE STL QL NAA+NON-PROBE: NOT DETECTED
EPITHELIAL CELLS 1715: ABNORMAL /HPF (ref 0–5)
ERYTHROCYTE [DISTWIDTH] IN BLOOD BY AUTOMATED COUNT: 46.3 FL (ref 35.9–50)
ETEC LTA+ST1A+ST1B TOX ST NAA+NON-PROBE: NOT DETECTED
FERRITIN SERPL-MCNC: 5477 NG/ML (ref 22–322)
FLUAV H1 2009 PAN RNA NPH NAA+NON-PROBE: DETECTED
FLUBV RNA NPH QL NAA+NON-PROBE: NOT DETECTED
G LAMBLIA DNA STL QL NAA+NON-PROBE: NOT DETECTED
GFR SERPLBLD CREATININE-BSD FMLA CKD-EPI: 100 ML/MIN/1.73 M 2
GFR SERPLBLD CREATININE-BSD FMLA CKD-EPI: 103 ML/MIN/1.73 M 2
GFR SERPLBLD CREATININE-BSD FMLA CKD-EPI: 92 ML/MIN/1.73 M 2
GLUCOSE BLD STRIP.AUTO-MCNC: 135 MG/DL (ref 65–99)
GLUCOSE BLD STRIP.AUTO-MCNC: 143 MG/DL (ref 65–99)
GLUCOSE BLD STRIP.AUTO-MCNC: 151 MG/DL (ref 65–99)
GLUCOSE BLD STRIP.AUTO-MCNC: 240 MG/DL (ref 65–99)
GLUCOSE SERPL-MCNC: 139 MG/DL (ref 65–99)
GLUCOSE SERPL-MCNC: 186 MG/DL (ref 65–99)
GLUCOSE SERPL-MCNC: 246 MG/DL (ref 65–99)
GLUCOSE UR STRIP.AUTO-MCNC: NEGATIVE MG/DL
GRANULAR CASTS  1716G: PRESENT /LPF
HADV DNA NPH QL NAA+NON-PROBE: NOT DETECTED
HCOV 229E RNA NPH QL NAA+NON-PROBE: NOT DETECTED
HCOV HKU1 RNA NPH QL NAA+NON-PROBE: NOT DETECTED
HCOV NL63 RNA NPH QL NAA+NON-PROBE: NOT DETECTED
HCOV OC43 RNA NPH QL NAA+NON-PROBE: NOT DETECTED
HCT VFR BLD AUTO: 33.6 % (ref 42–52)
HGB BLD-MCNC: 11.6 G/DL (ref 14–18)
HIV 1+2 AB+HIV1 P24 AG SERPL QL IA: NORMAL
HIV 1+2 AB+HIV1 P24 AG SERPL QL IA: NORMAL
HMPV RNA NPH QL NAA+NON-PROBE: NOT DETECTED
HPIV1 RNA NPH QL NAA+NON-PROBE: NOT DETECTED
HPIV2 RNA NPH QL NAA+NON-PROBE: NOT DETECTED
HPIV3 RNA NPH QL NAA+NON-PROBE: NOT DETECTED
HPIV4 RNA NPH QL NAA+NON-PROBE: NOT DETECTED
HYALINE CAST   1831: PRESENT /LPF
IRON SATN MFR SERPL: 12 % (ref 15–55)
IRON SERPL-MCNC: 21 UG/DL (ref 50–180)
KETONES UR STRIP.AUTO-MCNC: ABNORMAL MG/DL
LEUKOCYTE ESTERASE UR QL STRIP.AUTO: NEGATIVE
LYMPHOCYTES # BLD AUTO: 1.47 K/UL (ref 1–4.8)
LYMPHOCYTES NFR BLD: 66.7 % (ref 22–41)
M PNEUMO DNA NPH QL NAA+NON-PROBE: NOT DETECTED
MANUAL DIFF BLD: NORMAL
MCH RBC QN AUTO: 28.5 PG (ref 27–33)
MCHC RBC AUTO-ENTMCNC: 34.5 G/DL (ref 32.3–36.5)
MCV RBC AUTO: 82.6 FL (ref 81.4–97.8)
MICRO URNS: ABNORMAL
MICROCYTES BLD QL SMEAR: ABNORMAL
MONOCYTES # BLD AUTO: 0.36 K/UL (ref 0–0.85)
MONOCYTES NFR BLD AUTO: 16.2 % (ref 0–13.4)
NEUTROPHILS # BLD AUTO: 0.38 K/UL (ref 1.82–7.42)
NEUTROPHILS NFR BLD: 17.1 % (ref 44–72)
NITRITE UR QL STRIP.AUTO: NEGATIVE
NOROVIRUS GI+II RNA STL QL NAA+NON-PROBE: NOT DETECTED
NRBC # BLD AUTO: 0 K/UL
NRBC BLD-RTO: 0 /100 WBC (ref 0–0.2)
P SHIGELLOIDES DNA STL QL NAA+NON-PROBE: NOT DETECTED
PH UR STRIP.AUTO: 5 [PH] (ref 5–8)
PLATELET # BLD AUTO: 162 K/UL (ref 164–446)
PLATELET BLD QL SMEAR: NORMAL
PMV BLD AUTO: 11.3 FL (ref 9–12.9)
POTASSIUM SERPL-SCNC: 3.9 MMOL/L (ref 3.6–5.5)
POTASSIUM SERPL-SCNC: 4.1 MMOL/L (ref 3.6–5.5)
POTASSIUM SERPL-SCNC: 4.1 MMOL/L (ref 3.6–5.5)
PROCALCITONIN SERPL-MCNC: 0.37 NG/ML
PROT UR QL STRIP: 300 MG/DL
RBC # BLD AUTO: 4.07 M/UL (ref 4.7–6.1)
RBC # URNS HPF: ABNORMAL /HPF
RBC BLD AUTO: PRESENT
RBC UR QL AUTO: ABNORMAL
RSV RNA NPH QL NAA+NON-PROBE: NOT DETECTED
RV+EV RNA NPH QL NAA+NON-PROBE: NOT DETECTED
RVA RNA STL QL NAA+NON-PROBE: NOT DETECTED
S ENT+BONG DNA STL QL NAA+NON-PROBE: NOT DETECTED
SAPO I+II+IV+V RNA STL QL NAA+NON-PROBE: NOT DETECTED
SARS-COV-2 RNA NPH QL NAA+NON-PROBE: NOTDETECTED
SCCMEC + MECA PNL NOSE NAA+PROBE: NEGATIVE
SHIGELLA SP+EIEC IPAH ST NAA+NON-PROBE: NOT DETECTED
SMUDGE CELLS BLD QL SMEAR: NORMAL
SODIUM SERPL-SCNC: 122 MMOL/L (ref 135–145)
SODIUM SERPL-SCNC: 123 MMOL/L (ref 135–145)
SODIUM SERPL-SCNC: 124 MMOL/L (ref 135–145)
SODIUM UR-SCNC: 29 MMOL/L
SP GR UR STRIP.AUTO: 1.02
T PALLIDUM AB SER QL IA: NORMAL
TIBC SERPL-MCNC: 170 UG/DL (ref 250–450)
TROPONIN T SERPL-MCNC: 17 NG/L (ref 6–19)
UIBC SERPL-MCNC: 149 UG/DL (ref 110–370)
UROBILINOGEN UR STRIP.AUTO-MCNC: 1 EU/DL
V CHOL+PARA+VUL DNA STL QL NAA+NON-PROBE: NOT DETECTED
V CHOLERAE DNA STL QL NAA+NON-PROBE: NOT DETECTED
WBC # BLD AUTO: 2.2 K/UL (ref 4.8–10.8)
WBC #/AREA URNS HPF: ABNORMAL /HPF
Y ENTEROCOL DNA STL QL NAA+NON-PROBE: NOT DETECTED

## 2025-03-02 PROCEDURE — 770020 HCHG ROOM/CARE - TELE (206)

## 2025-03-02 PROCEDURE — 82962 GLUCOSE BLOOD TEST: CPT

## 2025-03-02 PROCEDURE — 99233 SBSQ HOSP IP/OBS HIGH 50: CPT | Performed by: INTERNAL MEDICINE

## 2025-03-02 PROCEDURE — 85007 BL SMEAR W/DIFF WBC COUNT: CPT

## 2025-03-02 PROCEDURE — 700102 HCHG RX REV CODE 250 W/ 637 OVERRIDE(OP): Performed by: INTERNAL MEDICINE

## 2025-03-02 PROCEDURE — 87493 C DIFF AMPLIFIED PROBE: CPT

## 2025-03-02 PROCEDURE — 700117 HCHG RX CONTRAST REV CODE 255: Mod: JZ | Performed by: INTERNAL MEDICINE

## 2025-03-02 PROCEDURE — A9270 NON-COVERED ITEM OR SERVICE: HCPCS | Performed by: INTERNAL MEDICINE

## 2025-03-02 PROCEDURE — 71275 CT ANGIOGRAPHY CHEST: CPT

## 2025-03-02 PROCEDURE — 83550 IRON BINDING TEST: CPT

## 2025-03-02 PROCEDURE — 86780 TREPONEMA PALLIDUM: CPT

## 2025-03-02 PROCEDURE — 87507 IADNA-DNA/RNA PROBE TQ 12-25: CPT

## 2025-03-02 PROCEDURE — 36415 COLL VENOUS BLD VENIPUNCTURE: CPT

## 2025-03-02 PROCEDURE — A9579 GAD-BASE MR CONTRAST NOS,1ML: HCPCS | Mod: JZ | Performed by: INTERNAL MEDICINE

## 2025-03-02 PROCEDURE — 700105 HCHG RX REV CODE 258: Performed by: INTERNAL MEDICINE

## 2025-03-02 PROCEDURE — 84145 PROCALCITONIN (PCT): CPT

## 2025-03-02 PROCEDURE — 0202U NFCT DS 22 TRGT SARS-COV-2: CPT

## 2025-03-02 PROCEDURE — 82728 ASSAY OF FERRITIN: CPT

## 2025-03-02 PROCEDURE — 84484 ASSAY OF TROPONIN QUANT: CPT

## 2025-03-02 PROCEDURE — 87389 HIV-1 AG W/HIV-1&-2 AB AG IA: CPT

## 2025-03-02 PROCEDURE — 94760 N-INVAS EAR/PLS OXIMETRY 1: CPT

## 2025-03-02 PROCEDURE — 700111 HCHG RX REV CODE 636 W/ 250 OVERRIDE (IP): Mod: JZ | Performed by: INTERNAL MEDICINE

## 2025-03-02 PROCEDURE — 87641 MR-STAPH DNA AMP PROBE: CPT

## 2025-03-02 PROCEDURE — 70553 MRI BRAIN STEM W/O & W/DYE: CPT

## 2025-03-02 PROCEDURE — 83540 ASSAY OF IRON: CPT

## 2025-03-02 PROCEDURE — 700117 HCHG RX CONTRAST REV CODE 255: Performed by: INTERNAL MEDICINE

## 2025-03-02 PROCEDURE — 80048 BASIC METABOLIC PNL TOTAL CA: CPT | Mod: 91

## 2025-03-02 PROCEDURE — 85027 COMPLETE CBC AUTOMATED: CPT

## 2025-03-02 PROCEDURE — 700105 HCHG RX REV CODE 258: Performed by: HOSPITALIST

## 2025-03-02 RX ORDER — SODIUM CHLORIDE 9 MG/ML
500 INJECTION, SOLUTION INTRAVENOUS ONCE
Status: COMPLETED | OUTPATIENT
Start: 2025-03-02 | End: 2025-03-02

## 2025-03-02 RX ORDER — ACETAMINOPHEN 500 MG
1000 TABLET ORAL EVERY 6 HOURS PRN
COMMUNITY

## 2025-03-02 RX ORDER — SODIUM CHLORIDE 450 MG/100ML
600 INJECTION, SOLUTION INTRAVENOUS CONTINUOUS
Status: ACTIVE | OUTPATIENT
Start: 2025-03-02 | End: 2025-03-02

## 2025-03-02 RX ORDER — OSELTAMIVIR PHOSPHATE 75 MG/1
75 CAPSULE ORAL 2 TIMES DAILY
Status: COMPLETED | OUTPATIENT
Start: 2025-03-02 | End: 2025-03-07

## 2025-03-02 RX ORDER — SODIUM CHLORIDE 9 MG/ML
INJECTION, SOLUTION INTRAVENOUS CONTINUOUS
Status: DISCONTINUED | OUTPATIENT
Start: 2025-03-02 | End: 2025-03-04

## 2025-03-02 RX ORDER — SODIUM CHLORIDE 450 MG/100ML
INJECTION, SOLUTION INTRAVENOUS CONTINUOUS
Status: DISCONTINUED | OUTPATIENT
Start: 2025-03-02 | End: 2025-03-02

## 2025-03-02 RX ADMIN — INSULIN LISPRO 1 UNITS: 100 INJECTION, SOLUTION INTRAVENOUS; SUBCUTANEOUS at 21:10

## 2025-03-02 RX ADMIN — OSELTAMIVIR PHOSPHATE 75 MG: 75 CAPSULE ORAL at 14:28

## 2025-03-02 RX ADMIN — GADOTERIDOL 20 ML: 279.3 INJECTION, SOLUTION INTRAVENOUS at 16:39

## 2025-03-02 RX ADMIN — FENOFIBRATE 134 MG: 134 CAPSULE ORAL at 05:24

## 2025-03-02 RX ADMIN — CEFEPIME 2 G: 2 INJECTION, POWDER, FOR SOLUTION INTRAVENOUS at 05:21

## 2025-03-02 RX ADMIN — SODIUM CHLORIDE: 9 INJECTION, SOLUTION INTRAVENOUS at 20:13

## 2025-03-02 RX ADMIN — ACETAMINOPHEN 1000 MG: 500 TABLET ORAL at 07:03

## 2025-03-02 RX ADMIN — ASPIRIN 81 MG: 81 TABLET, COATED ORAL at 05:23

## 2025-03-02 RX ADMIN — CEFEPIME 2 G: 2 INJECTION, POWDER, FOR SOLUTION INTRAVENOUS at 21:14

## 2025-03-02 RX ADMIN — SITAGLIPTIN 100 MG: 50 TABLET, FILM COATED ORAL at 05:24

## 2025-03-02 RX ADMIN — SODIUM CHLORIDE 500 ML: 9 INJECTION, SOLUTION INTRAVENOUS at 19:44

## 2025-03-02 RX ADMIN — LINEZOLID 600 MG: 600 TABLET, FILM COATED ORAL at 05:24

## 2025-03-02 RX ADMIN — CEFEPIME 2 G: 2 INJECTION, POWDER, FOR SOLUTION INTRAVENOUS at 14:28

## 2025-03-02 RX ADMIN — SODIUM CHLORIDE: 9 INJECTION, SOLUTION INTRAVENOUS at 00:54

## 2025-03-02 RX ADMIN — ATORVASTATIN CALCIUM 80 MG: 40 TABLET, FILM COATED ORAL at 17:44

## 2025-03-02 RX ADMIN — CARVEDILOL 12.5 MG: 6.25 TABLET, FILM COATED ORAL at 17:43

## 2025-03-02 RX ADMIN — SODIUM CHLORIDE 600 ML: 4.5 INJECTION, SOLUTION INTRAVENOUS at 10:07

## 2025-03-02 RX ADMIN — IOHEXOL 65 ML: 350 INJECTION, SOLUTION INTRAVENOUS at 09:27

## 2025-03-02 RX ADMIN — ENOXAPARIN SODIUM 40 MG: 100 INJECTION SUBCUTANEOUS at 17:43

## 2025-03-02 RX ADMIN — ACETAMINOPHEN 1000 MG: 500 TABLET ORAL at 17:44

## 2025-03-02 RX ADMIN — INSULIN LISPRO 2 UNITS: 100 INJECTION, SOLUTION INTRAVENOUS; SUBCUTANEOUS at 11:30

## 2025-03-02 ASSESSMENT — ENCOUNTER SYMPTOMS
FEVER: 1
ABDOMINAL PAIN: 0
DIARRHEA: 1
WEAKNESS: 1
COUGH: 1
SHORTNESS OF BREATH: 1

## 2025-03-02 ASSESSMENT — FIBROSIS 4 INDEX: FIB4 SCORE: 3.88

## 2025-03-02 ASSESSMENT — SOCIAL DETERMINANTS OF HEALTH (SDOH)
IN THE PAST 12 MONTHS, HAS THE ELECTRIC, GAS, OIL, OR WATER COMPANY THREATENED TO SHUT OFF SERVICE IN YOUR HOME?: NO
WITHIN THE PAST 12 MONTHS, YOU WORRIED THAT YOUR FOOD WOULD RUN OUT BEFORE YOU GOT THE MONEY TO BUY MORE: NEVER TRUE
WITHIN THE PAST 12 MONTHS, THE FOOD YOU BOUGHT JUST DIDN'T LAST AND YOU DIDN'T HAVE MONEY TO GET MORE: NEVER TRUE

## 2025-03-02 ASSESSMENT — PAIN DESCRIPTION - PAIN TYPE: TYPE: ACUTE PAIN

## 2025-03-02 NOTE — ASSESSMENT & PLAN NOTE
I noted splenomegaly on CT scan.  No prior colonoscopy  Ferritin is extremely elevated 5477.  Only abnormality on CT A/P

## 2025-03-02 NOTE — PROGRESS NOTES
MD notified that patient's Na had increased from 118 at 2102 to 124 at 0415. MD ordered to stop IVF.     Vicki from Lab called with critical result of ANC, 0.38 at 0538 Critical lab result read back to Vicki.   Dr. Soto notified of critical lab result at 0542.  Critical lab result read back by Dr. Soto.

## 2025-03-02 NOTE — PROGRESS NOTES
Telemetry summary    Rhythm: SR  Rate: 75-92  Ectopy: rPVC  Measurements: 0.16/0.08/0.38    Normal Values  Rhythm: SR  HR Range:   Measurement: 0.12-0.2/0.06-0.10/0.30-0.52

## 2025-03-02 NOTE — ASSESSMENT & PLAN NOTE
This is Sepsis Present on admission  SIRS criteria identified on my evaluation include: Fever, with temperature greater than 100.9 deg F, Tachycardia, with heart rate greater than 90 BPM, Tachypnea, with respirations greater than 20 per minute, and Leukopenia, with WBC less than 4,000  Clinical indicators of end organ dysfunction include Toxic Metabolic Encephalopathy  Source is community-acquired bacterial pneumonia  Sepsis protocol initiated  Crystalloid Fluid Administration: Resuscitation volume of 0 ordered. Reason that resuscitation volume of less than 30ml/kg was ordered  hyponatremia  IV antibiotics as appropriate for source of sepsis  Reassessment: I have reassessed the patient's hemodynamic status    CT head showed left maxillary sinusitis. CXR showing RML consolidation.  Discontinue antibiotics, afebrile >48 hours

## 2025-03-02 NOTE — PROGRESS NOTES
4 Eyes Skin Assessment Completed by VENKATA Sanchez and VENKATA Saba.    Head WDL  Ears WDL  Nose WDL  Mouth WDL  Neck WDL  Breast/Chest WDL  Shoulder Blades WDL  Spine WDL  (R) Arm/Elbow/Hand WDL  (L) Arm/Elbow/Hand WDL  Abdomen WDL  Groin WDL  Scrotum/Coccyx/Buttocks WDL  (R) Leg Scar on lower leg  (L) Leg Scar on lower leg  (R) Heel/Foot/Toe Dry and flaky  (L) Heel/Foot/Toe Dry and flaky          Devices In Places Tele Box, Blood Pressure Cuff, and Pulse Ox      Interventions In Place Pillows    Possible Skin Injury No    Pictures Uploaded Into Epic N/A  Wound Consult Placed N/A  RN Wound Prevention Protocol Ordered No

## 2025-03-02 NOTE — PROGRESS NOTES
Medication history reviewed with pts wife. Med rec is complete.  Allergies reviewed, per pts wife    Pt asked me to call his wife, called pts wife (Radha) at [phone] to verify all medications.     Patient has not had any outpatient antibiotics in the last 30 days.    Pt is not on any anticoagulants

## 2025-03-02 NOTE — ASSESSMENT & PLAN NOTE
Consistent with sepsis, chest x-ray  CTA chest showed no PE, right upper lobe pneumonia.  Confirmed splenomegaly.  completed IV cefepime

## 2025-03-02 NOTE — ASSESSMENT & PLAN NOTE
2/2 sepsis  I noted smudge cells on differential, no nucleated RBCs.  CTA chest showing a large spleen.  3/2: WBC 2.2, hemoglobin 11.6, platelets 162.  3/3: WBC 4.7, hemoglobin 10.4, platelets 135.  Pending CT abdomen pelvis.  3/4:  WBC 1.9, hemoglobin 9.4 platelets 165,  CT abdomen pelvis only showing enlarged spleen, no other significant abnormalities.  3/5: WBC 1.8, , hemoglobin 9.5, platelets 206  3/6: WBC 2.1, , HGB 9.8, platelets 255 - Give Granix   3/7: WBC 2.5, , hgb 11.4, platelets 240

## 2025-03-02 NOTE — ASSESSMENT & PLAN NOTE
Patient had a STEMI back in 10/2022  No CP, EKG OK  Troponin 17 from 19  Continue aspirin, fenofibrate, carvedilol, lisinopril and atorvastatin.

## 2025-03-02 NOTE — PROGRESS NOTES
Assumed care of patient at bedside report from NOC RN. Updated on POC. Patient currently A & O x 4; on room air  O2 95 percent saturation; up one person assist; without complaints of acute pain. Assessment completed. Call light within reach. Whiteboard updated. Fall precautions in place. Bed locked and in lowest position. All questions answered. No other needs indicated at this time.

## 2025-03-02 NOTE — HOSPITAL COURSE
54-year-old male with a past medical history of    Presented to the ER with febrile illness 104.8F, heart rate 104, 96, 97.  Was hypoxic to 79% on room air.  Labs showed a leukopenia of 2.4, , severe hyponatremia 118, metabolic acidosis with a bicarb of 16, lactic acid 1.4.  Patient was recently positive for influenza A viral pneumonia on 2/20/2025.  Chest x-ray consistent with a right perihilar pneumonia.    H1N1 positive on viral PCR, currently on tamiflu.  Causing severe neutropenia and febrile so also being treated with cefepime for febrile neutropenia.  ANC <500.
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
## 2025-03-02 NOTE — ASSESSMENT & PLAN NOTE
We will continue patient on insulin sliding scale, Accu-Cheks and hypoglycemia protocol.  Holding home metformin  Continue Januvia  Patient is slowly increasing his eating  Patient does not recall using Lyrica.  He remembers gabapentin.  I will restart Lyrica which was giving better benefits as per PCP note.
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
## 2025-03-02 NOTE — ASSESSMENT & PLAN NOTE
Resolved  Due to sepsis and fevers 104.8F in the ER  Stop seizure precautions  Stop neurochecks  I reviewed brain MRI with patient, no acute stroke findings.  There is no mass or hemorrhagic findings either.

## 2025-03-02 NOTE — ASSESSMENT & PLAN NOTE
CT head showed no signs of intracranial hemorrhage, only Left maxillary sinusitis. CXR showing RML consolidation.  ER physician determined patient was not showing any traumatic signs or symptoms during their examination in the ER.  It does not appear that trauma services were consulted.  Can stop neurochecks.  Fall precautions.

## 2025-03-02 NOTE — CARE PLAN
The patient is Watcher - Medium risk of patient condition declining or worsening    Shift Goals  Clinical Goals: monitor Na and respiratory status, NS, ABX  Patient Goals: sleep, increase Na    Progress made toward(s) clinical / shift goals:      The patient has remained alert and oriented thus far. He has brief episodes of unconsciousness during conversation, but is easily aroused. These episodes remain sparse and have not increased in length or duration. Q4 Neuro checks are ordered. Sodium level is still 118, but is receiving NaCl 0.9% to increase this. Temperature has slowly decreased since admission to the unit. Ice packs in place. Patient remains on room air with oxygen saturations in the mid 90s. The patient and their spouse have been educated on the patient's high fall risk level. Fall risk interventions in place. Patient is a times one assist. Patient and his spouse gave verbal understanding to all medications, fall precautions, and disease process.       Problem: Knowledge Deficit - Standard  Goal: Patient and family/care givers will demonstrate understanding of plan of care, disease process/condition, diagnostic tests and medications  3/2/2025 0158 by Laura Chance R.N.  Outcome: Progressing  3/2/2025 0155 by Laura Chance R.N.  Outcome: Progressing     Problem: Fall Risk  Goal: Patient will remain free from falls  3/2/2025 0158 by Laura Chance R.N.  Outcome: Progressing  3/2/2025 0155 by Laura Chance R.N.  Outcome: Progressing     Problem: Neuro Status  Goal: Neuro status will remain stable or improve  Outcome: Progressing     Problem: Respiratory  Goal: Patient will achieve/maintain optimum respiratory ventilation and gas exchange  Outcome: Progressing

## 2025-03-02 NOTE — PROGRESS NOTES
Pt is not sure what medications he is taking asked me to call his wife.  Called pts wife (Radha) at [phone] left message

## 2025-03-02 NOTE — ASSESSMENT & PLAN NOTE
on admission  CT head showed left maxillary sinusitis. CXR showing RML consolidation.  Decreased down to 190    , post single dose granix.  Febrile with granix administration, usual reaction

## 2025-03-02 NOTE — ASSESSMENT & PLAN NOTE
Fay vazquez ordered  Pt smoked since he was a teenager. He states he quit 1 year ago. 1pp.  35-40 pack year history.  Patient understands a follow-up in outpatient CT scan with his PCP to ensure the right upper lung pneumonia is not advanced.

## 2025-03-02 NOTE — PROGRESS NOTES
Assumed care of patient at bedside report from NOC RN. Updated on POC. Patient currently A & O x 4; on room air  O2 98 percent room air; up one person assist; without complaints of acute pain. Assessment completed. Call light within reach. Whiteboard updated. Fall precautions in place. Bed locked and in lowest position. All questions answered. No other needs indicated at this time.

## 2025-03-02 NOTE — ASSESSMENT & PLAN NOTE
Due to infection, decreased eating, unknown etiology?    Sodium now 130  Suspect was abnormal related to viral illness.  Stop fluid restrictions.
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
## 2025-03-03 ENCOUNTER — APPOINTMENT (OUTPATIENT)
Dept: RADIOLOGY | Facility: MEDICAL CENTER | Age: 55
DRG: 871 | End: 2025-03-03
Attending: INTERNAL MEDICINE
Payer: COMMERCIAL

## 2025-03-03 PROBLEM — R79.89 ELEVATED FERRITIN: Status: ACTIVE | Noted: 2025-03-03

## 2025-03-03 LAB
ALBUMIN SERPL BCP-MCNC: 2.8 G/DL (ref 3.2–4.9)
ALP SERPL-CCNC: 44 U/L (ref 30–99)
ALT SERPL-CCNC: 40 U/L (ref 2–50)
ANION GAP SERPL CALC-SCNC: 10 MMOL/L (ref 7–16)
ANION GAP SERPL CALC-SCNC: 11 MMOL/L (ref 7–16)
ANION GAP SERPL CALC-SCNC: 12 MMOL/L (ref 7–16)
AST SERPL-CCNC: 68 U/L (ref 12–45)
BILIRUB CONJ SERPL-MCNC: 0.3 MG/DL (ref 0.1–0.5)
BILIRUB INDIRECT SERPL-MCNC: 0.4 MG/DL (ref 0–1)
BILIRUB SERPL-MCNC: 0.7 MG/DL (ref 0.1–1.5)
BUN SERPL-MCNC: 10 MG/DL (ref 8–22)
BUN SERPL-MCNC: 11 MG/DL (ref 8–22)
BUN SERPL-MCNC: 12 MG/DL (ref 8–22)
CALCIUM ALBUM COR SERPL-MCNC: 9.2 MG/DL (ref 8.5–10.5)
CALCIUM SERPL-MCNC: 8.1 MG/DL (ref 8.4–10.2)
CALCIUM SERPL-MCNC: 8.1 MG/DL (ref 8.4–10.2)
CALCIUM SERPL-MCNC: 8.2 MG/DL (ref 8.4–10.2)
CHLORIDE SERPL-SCNC: 100 MMOL/L (ref 96–112)
CHLORIDE SERPL-SCNC: 100 MMOL/L (ref 96–112)
CHLORIDE SERPL-SCNC: 99 MMOL/L (ref 96–112)
CO2 SERPL-SCNC: 17 MMOL/L (ref 20–33)
CO2 SERPL-SCNC: 17 MMOL/L (ref 20–33)
CO2 SERPL-SCNC: 19 MMOL/L (ref 20–33)
CREAT SERPL-MCNC: 0.74 MG/DL (ref 0.5–1.4)
CREAT SERPL-MCNC: 0.75 MG/DL (ref 0.5–1.4)
CREAT SERPL-MCNC: 0.79 MG/DL (ref 0.5–1.4)
ERYTHROCYTE [DISTWIDTH] IN BLOOD BY AUTOMATED COUNT: 47.5 FL (ref 35.9–50)
GFR SERPLBLD CREATININE-BSD FMLA CKD-EPI: 104 ML/MIN/1.73 M 2
GFR SERPLBLD CREATININE-BSD FMLA CKD-EPI: 105 ML/MIN/1.73 M 2
GFR SERPLBLD CREATININE-BSD FMLA CKD-EPI: 106 ML/MIN/1.73 M 2
GLUCOSE BLD STRIP.AUTO-MCNC: 138 MG/DL (ref 65–99)
GLUCOSE BLD STRIP.AUTO-MCNC: 151 MG/DL (ref 65–99)
GLUCOSE BLD STRIP.AUTO-MCNC: 154 MG/DL (ref 65–99)
GLUCOSE BLD STRIP.AUTO-MCNC: 175 MG/DL (ref 65–99)
GLUCOSE SERPL-MCNC: 131 MG/DL (ref 65–99)
GLUCOSE SERPL-MCNC: 131 MG/DL (ref 65–99)
GLUCOSE SERPL-MCNC: 138 MG/DL (ref 65–99)
HCT VFR BLD AUTO: 30.4 % (ref 42–52)
HGB BLD-MCNC: 10.4 G/DL (ref 14–18)
MAGNESIUM SERPL-MCNC: 2.2 MG/DL (ref 1.5–2.5)
MCH RBC QN AUTO: 28.6 PG (ref 27–33)
MCHC RBC AUTO-ENTMCNC: 34.2 G/DL (ref 32.3–36.5)
MCV RBC AUTO: 83.5 FL (ref 81.4–97.8)
PHOSPHATE SERPL-MCNC: 3.1 MG/DL (ref 2.5–4.5)
PLATELET # BLD AUTO: 135 K/UL (ref 164–446)
PMV BLD AUTO: 11 FL (ref 9–12.9)
POTASSIUM SERPL-SCNC: 3.7 MMOL/L (ref 3.6–5.5)
POTASSIUM SERPL-SCNC: 3.9 MMOL/L (ref 3.6–5.5)
POTASSIUM SERPL-SCNC: 4 MMOL/L (ref 3.6–5.5)
PROT SERPL-MCNC: 6.2 G/DL (ref 6–8.2)
RBC # BLD AUTO: 3.64 M/UL (ref 4.7–6.1)
SODIUM SERPL-SCNC: 127 MMOL/L (ref 135–145)
SODIUM SERPL-SCNC: 128 MMOL/L (ref 135–145)
SODIUM SERPL-SCNC: 130 MMOL/L (ref 135–145)
WBC # BLD AUTO: 4.7 K/UL (ref 4.8–10.8)

## 2025-03-03 PROCEDURE — 80048 BASIC METABOLIC PNL TOTAL CA: CPT

## 2025-03-03 PROCEDURE — 99233 SBSQ HOSP IP/OBS HIGH 50: CPT | Performed by: INTERNAL MEDICINE

## 2025-03-03 PROCEDURE — 700111 HCHG RX REV CODE 636 W/ 250 OVERRIDE (IP): Mod: JZ | Performed by: INTERNAL MEDICINE

## 2025-03-03 PROCEDURE — 86235 NUCLEAR ANTIGEN ANTIBODY: CPT | Mod: 91

## 2025-03-03 PROCEDURE — 86225 DNA ANTIBODY NATIVE: CPT

## 2025-03-03 PROCEDURE — 80069 RENAL FUNCTION PANEL: CPT

## 2025-03-03 PROCEDURE — 97162 PT EVAL MOD COMPLEX 30 MIN: CPT

## 2025-03-03 PROCEDURE — 84460 ALANINE AMINO (ALT) (SGPT): CPT

## 2025-03-03 PROCEDURE — 97535 SELF CARE MNGMENT TRAINING: CPT

## 2025-03-03 PROCEDURE — A9270 NON-COVERED ITEM OR SERVICE: HCPCS | Performed by: INTERNAL MEDICINE

## 2025-03-03 PROCEDURE — 97165 OT EVAL LOW COMPLEX 30 MIN: CPT

## 2025-03-03 PROCEDURE — 94760 N-INVAS EAR/PLS OXIMETRY 1: CPT

## 2025-03-03 PROCEDURE — 74177 CT ABD & PELVIS W/CONTRAST: CPT

## 2025-03-03 PROCEDURE — 700117 HCHG RX CONTRAST REV CODE 255: Performed by: INTERNAL MEDICINE

## 2025-03-03 PROCEDURE — 700102 HCHG RX REV CODE 250 W/ 637 OVERRIDE(OP): Performed by: INTERNAL MEDICINE

## 2025-03-03 PROCEDURE — 82248 BILIRUBIN DIRECT: CPT

## 2025-03-03 PROCEDURE — 82247 BILIRUBIN TOTAL: CPT

## 2025-03-03 PROCEDURE — 700105 HCHG RX REV CODE 258: Performed by: INTERNAL MEDICINE

## 2025-03-03 PROCEDURE — 83735 ASSAY OF MAGNESIUM: CPT

## 2025-03-03 PROCEDURE — 84155 ASSAY OF PROTEIN SERUM: CPT

## 2025-03-03 PROCEDURE — 82962 GLUCOSE BLOOD TEST: CPT | Mod: 91

## 2025-03-03 PROCEDURE — 83516 IMMUNOASSAY NONANTIBODY: CPT | Mod: 91

## 2025-03-03 PROCEDURE — 770020 HCHG ROOM/CARE - TELE (206)

## 2025-03-03 PROCEDURE — 36415 COLL VENOUS BLD VENIPUNCTURE: CPT

## 2025-03-03 PROCEDURE — 85027 COMPLETE CBC AUTOMATED: CPT

## 2025-03-03 PROCEDURE — 84075 ASSAY ALKALINE PHOSPHATASE: CPT

## 2025-03-03 PROCEDURE — 700105 HCHG RX REV CODE 258: Performed by: HOSPITALIST

## 2025-03-03 PROCEDURE — 84450 TRANSFERASE (AST) (SGOT): CPT

## 2025-03-03 RX ORDER — PREGABALIN 75 MG/1
75 CAPSULE ORAL 3 TIMES DAILY
Status: DISCONTINUED | OUTPATIENT
Start: 2025-03-03 | End: 2025-03-08 | Stop reason: HOSPADM

## 2025-03-03 RX ADMIN — INSULIN LISPRO 1 UNITS: 100 INJECTION, SOLUTION INTRAVENOUS; SUBCUTANEOUS at 20:59

## 2025-03-03 RX ADMIN — CEFEPIME 2 G: 2 INJECTION, POWDER, FOR SOLUTION INTRAVENOUS at 06:11

## 2025-03-03 RX ADMIN — SITAGLIPTIN 100 MG: 50 TABLET, FILM COATED ORAL at 06:11

## 2025-03-03 RX ADMIN — PREGABALIN 75 MG: 75 CAPSULE ORAL at 13:13

## 2025-03-03 RX ADMIN — ENOXAPARIN SODIUM 40 MG: 100 INJECTION SUBCUTANEOUS at 16:53

## 2025-03-03 RX ADMIN — ATORVASTATIN CALCIUM 80 MG: 40 TABLET, FILM COATED ORAL at 16:53

## 2025-03-03 RX ADMIN — INSULIN LISPRO 1 UNITS: 100 INJECTION, SOLUTION INTRAVENOUS; SUBCUTANEOUS at 16:47

## 2025-03-03 RX ADMIN — CEFEPIME 2 G: 2 INJECTION, POWDER, FOR SOLUTION INTRAVENOUS at 13:14

## 2025-03-03 RX ADMIN — OSELTAMIVIR PHOSPHATE 75 MG: 75 CAPSULE ORAL at 06:11

## 2025-03-03 RX ADMIN — SODIUM CHLORIDE: 9 INJECTION, SOLUTION INTRAVENOUS at 12:37

## 2025-03-03 RX ADMIN — OSELTAMIVIR PHOSPHATE 75 MG: 75 CAPSULE ORAL at 16:53

## 2025-03-03 RX ADMIN — ASPIRIN 81 MG: 81 TABLET, COATED ORAL at 06:11

## 2025-03-03 RX ADMIN — PREGABALIN 75 MG: 75 CAPSULE ORAL at 16:53

## 2025-03-03 RX ADMIN — IOHEXOL 100 ML: 350 INJECTION, SOLUTION INTRAVENOUS at 14:15

## 2025-03-03 RX ADMIN — CEFEPIME 2 G: 2 INJECTION, POWDER, FOR SOLUTION INTRAVENOUS at 20:58

## 2025-03-03 RX ADMIN — INSULIN LISPRO 1 UNITS: 100 INJECTION, SOLUTION INTRAVENOUS; SUBCUTANEOUS at 11:22

## 2025-03-03 RX ADMIN — FENOFIBRATE 134 MG: 134 CAPSULE ORAL at 06:11

## 2025-03-03 ASSESSMENT — COGNITIVE AND FUNCTIONAL STATUS - GENERAL
SUGGESTED CMS G CODE MODIFIER DAILY ACTIVITY: CJ
MOVING FROM LYING ON BACK TO SITTING ON SIDE OF FLAT BED: A LITTLE
DAILY ACTIVITIY SCORE: 20
CLIMB 3 TO 5 STEPS WITH RAILING: A LITTLE
MOVING TO AND FROM BED TO CHAIR: A LITTLE
MOBILITY SCORE: 19
TOILETING: A LITTLE
WALKING IN HOSPITAL ROOM: A LITTLE
PERSONAL GROOMING: A LITTLE
STANDING UP FROM CHAIR USING ARMS: A LITTLE
SUGGESTED CMS G CODE MODIFIER MOBILITY: CK
DRESSING REGULAR LOWER BODY CLOTHING: A LITTLE
HELP NEEDED FOR BATHING: A LITTLE

## 2025-03-03 ASSESSMENT — ENCOUNTER SYMPTOMS
COUGH: 1
WEAKNESS: 1
SHORTNESS OF BREATH: 1
FEVER: 1
ABDOMINAL PAIN: 0
DIARRHEA: 1

## 2025-03-03 ASSESSMENT — GAIT ASSESSMENTS
DEVIATION: DECREASED BASE OF SUPPORT;SHUFFLED GAIT
DISTANCE (FEET): 40
GAIT LEVEL OF ASSIST: CONTACT GUARD ASSIST
ASSISTIVE DEVICE: OTHER (COMMENTS)
DISTANCE (FEET): 80

## 2025-03-03 ASSESSMENT — FIBROSIS 4 INDEX: FIB4 SCORE: 4.46

## 2025-03-03 ASSESSMENT — ACTIVITIES OF DAILY LIVING (ADL): TOILETING: INDEPENDENT

## 2025-03-03 ASSESSMENT — PAIN DESCRIPTION - PAIN TYPE: TYPE: ACUTE PAIN

## 2025-03-03 NOTE — CARE PLAN
The patient is Stable - Low risk of patient condition declining or worsening    Shift Goals  Clinical Goals: Monitor Na, Q4 neuro checks, Monitor Vitals, Safety  Patient Goals: Rest    Progress made toward(s) clinical / shift goals:    Problem: Knowledge Deficit - Standard  Goal: Patient and family/care givers will demonstrate understanding of plan of care, disease process/condition, diagnostic tests and medications  Description: Target End Date:  1-3 days or as soon as patient condition allows    Document in Patient Education    1.  Patient and family/caregiver oriented to unit, equipment, visitation policy and means for communicating concern  2.  Complete/review Learning Assessment  3.  Assess knowledge level of disease process/condition, treatment plan, diagnostic tests and medications  4.  Explain disease process/condition, treatment plan, diagnostic tests and medications  Outcome: Progressing     Problem: Fall Risk  Goal: Patient will remain free from falls  Description: Target End Date:  Prior to discharge or change in level of care    Document interventions on the [facility] Fall Risk Assessment    1.  Assess for fall risk factors  2.  Implement fall precautions  Outcome: Progressing       Patient is not progressing towards the following goals:

## 2025-03-03 NOTE — PROGRESS NOTES
Telemetry Shift Summary     Rhythm: SR  Rate: 60s-80s  Measurements: 0.16/0.10/0.40  Ectopy (reported by Monitor Tech): rare PAC     Normal Values  Rhythm: Sinus  HR:   Measurements: 0.12-0.20/0.06-0.10/0.30-0.52

## 2025-03-03 NOTE — PROGRESS NOTES
Assumed care of patient at bedside report from NOC RN. Updated on POC. Patient currently A & O x 4; on room air  O2 95 percent saturation; up one person hand held assist; without complaints of acute pain. Assessment completed. Call light within reach. Whiteboard updated. Fall precautions in place. Bed locked and in lowest position. All questions answered. No other needs indicated at this time.
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
## 2025-03-03 NOTE — CARE PLAN
The patient is Stable - Low risk of patient condition declining or worsening    Shift Goals  Clinical Goals: Q4 Nuero, Q6BMP, Monitor Vitals, Safety  Patient Goals: Sit up in Chair and Feel better    Progress made toward(s) clinical / shift goals:    Problem: Knowledge Deficit - Standard  Goal: Patient and family/care givers will demonstrate understanding of plan of care, disease process/condition, diagnostic tests and medications  Description: Target End Date:  1-3 days or as soon as patient condition allows    Document in Patient Education    1.  Patient and family/caregiver oriented to unit, equipment, visitation policy and means for communicating concern  2.  Complete/review Learning Assessment  3.  Assess knowledge level of disease process/condition, treatment plan, diagnostic tests and medications  4.  Explain disease process/condition, treatment plan, diagnostic tests and medications  3/3/2025 0900 by Frances Heck R.N.  Outcome: Progressing  3/3/2025 0856 by Frances Heck R.N.  Patient verbalized understanding of plan of care. All questions answered.  Problem: Fall Risk  Goal: Patient will remain free from falls  Description: Target End Date:  Prior to discharge or change in level of care    Document interventions on the [facility] Fall Risk Assessment    1.  Assess for fall risk factors  2.  Implement fall precautions  3/3/2025 0900 by Frances Heck R.N.  Outcome: Progressing  Patient remained free from falls during shift. Bed locked in lowest position. Bed alarm on.     Patient is not progressing towards the following goals:

## 2025-03-03 NOTE — DISCHARGE PLANNING
Received Choice Form at: 1533  Agency/Facility Name:  [facility]    Sent Referral per Choice Form at: 1552    Received Choice Form at: 1535  Agency/Facility Name:  [facility]   Sent Referral per Choice Form at: 1558

## 2025-03-03 NOTE — DISCHARGE PLANNING
Case Management Discharge Planning    Admission Date: 3/1/2025  GMLOS:    ALOS: 2    6-Clicks ADL Score: 24  6-Clicks Mobility Score: 19      Anticipated Discharge Dispo: Discharge Disposition: D/T to home under HHA care in anticipation of covered skilled care (06)  Discharge Address: [address] [facility] [address]  [place] [address]    DME Needed: Yes    DME Ordered: Yes    Action(s) Taken:     Spoke to pt and wife Radha at bedside. Pt and [place] [facility]. Confirmed address and PCP listed on facesheet. Pt agreeable to HH services. Pt also confirmed he will require cane. Choice forms completed for HH with 1)Carline 2)Roger and for cane from [employer]. Choice forms faxed to [facility]    Escalations Completed: None    Medically Clear: No    Next Steps: Follow up with Carline ZAVALA regarding acceptance. Bedside RN to deliver cane to pt's bedside.      Barriers to Discharge: Medical clearance            Care Transition Team Assessment    Information Source  Orientation Level: Oriented X4  Information Given By: Patient, Spouse  Informant's Name: Radha Mccormick  Who is responsible for making decisions for patient? : Patient    Readmission Evaluation  Is this a readmission?: No    Elopement Risk  Legal Hold: No  Ambulatory or Self Mobile in Wheelchair: Yes  Disoriented: No  Psychiatric Symptoms: None  History of Wandering: No  Elopement this Admit: No  Vocalizing Wanting to Leave: No  Displays Behaviors, Body Language Wanting to Leave: No-Not at Risk for Elopement  Elopement Risk: Not at Risk for Elopement    Interdisciplinary Discharge Planning  Lives with - Patient's Self Care Capacity: Spouse  Patient or legal guardian wants to designate a caregiver: No  Support Systems: Friends / Neighbors, Children, Spouse / Significant Other  Housing / Facility: [address] [place] (states he can stay on first floor if needed, bedroom is upstairs)  Prior Services: None    Discharge Preparedness  What is your plan after discharge?:  Home health care  What are your discharge supports?: Spouse    Finances  Financial Barriers to Discharge: No  Prescription Coverage: Yes    Vision / Hearing Impairment  Vision Impairment : Yes  Right Eye Vision: Impaired, Wears Glasses  Left Eye Vision: Impaired, Wears Glasses  Hearing Impairment : No    Advance Directive  Advance Directive?: None    Domestic Abuse  Have you ever been the victim of abuse or violence?: No  Possible Abuse/Neglect Reported to:: Not Applicable    Discharge Risks or Barriers  Discharge risks or barriers?: No    Anticipated Discharge Information  Discharge Disposition: D/T to home under A care in anticipation of covered skilled care (06)  Discharge Address: [address] [place] [address] [address]  [place] [id number]
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
## 2025-03-03 NOTE — PROGRESS NOTES
1923 Dr Hoyos made aware of patient's low blood pressures this evening as well as patient's hyponatremia and previous 1/2NS bolus given earlier in the day. New orders received.
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
## 2025-03-03 NOTE — PROGRESS NOTES
Telemetry Shift Summary     Rhythm: SR-ST  HR:   Ectopy: none    Measurements: 0.18/0.10/0.40    Normal Values  Rhythm: SR  HR:   Measurements: 0.12-0.20/0.08-0.10/0.30-0.52

## 2025-03-03 NOTE — CARE PLAN
The patient is Watcher - Medium risk of patient condition declining or worsening    Shift Goals  Clinical Goals: I4Gxgwn, Q6BMP  Patient Goals: Rest    Progress made toward(s) clinical / shift goals:    Problem: Knowledge Deficit - Standard  Goal: Patient and family/care givers will demonstrate understanding of plan of care, disease process/condition, diagnostic tests and medications  Outcome: Progressing  Note: Q6BMPs being drawn to monitor electrolytes, Qneuros, vitals taken per tele policy     Problem: Neuro Status  Goal: Neuro status will remain stable or improve  Outcome: Progressing       Patient is not progressing towards the following goals:

## 2025-03-03 NOTE — PROGRESS NOTES
Dr Soto made aware of patient's morning sodium increase per nursing communication. Continuing IVF per Dr Soto.

## 2025-03-03 NOTE — ASSESSMENT & PLAN NOTE
Ferritin is highly elevated at 5477  I discussed with patient to ensure he follows up with his primary physician Dr. Samuels.  I recommended to repeat ferritin levels when he is back to his baseline and his illness has improved.  I also recommended that he needs to proceed with a colonoscopy to evaluate as he has not had one since turning 48.  I did recommend to follow-up as CT scan for his right upper lung pneumonia, given his history of prolonged tobacco use they need to ensure she does not have evidence of a lung mass/cancer.  Given his splenomegaly on CT scan, I did inform him he may need to see a hematologist if his labs do not normalize including CBC, ferritin.

## 2025-03-04 LAB
ALBUMIN SERPL BCP-MCNC: 2.6 G/DL (ref 3.2–4.9)
ALP SERPL-CCNC: 43 U/L (ref 30–99)
ALT SERPL-CCNC: 33 U/L (ref 2–50)
ANION GAP SERPL CALC-SCNC: 10 MMOL/L (ref 7–16)
ANION GAP SERPL CALC-SCNC: 9 MMOL/L (ref 7–16)
AST SERPL-CCNC: 51 U/L (ref 12–45)
BASOPHILS # BLD AUTO: 2.9 % (ref 0–1.8)
BASOPHILS # BLD: 0.06 K/UL (ref 0–0.12)
BILIRUB CONJ SERPL-MCNC: 0.3 MG/DL (ref 0.1–0.5)
BILIRUB INDIRECT SERPL-MCNC: 0.3 MG/DL (ref 0–1)
BILIRUB SERPL-MCNC: 0.6 MG/DL (ref 0.1–1.5)
BUN SERPL-MCNC: 7 MG/DL (ref 8–22)
BUN SERPL-MCNC: 9 MG/DL (ref 8–22)
BUN SERPL-MCNC: 9 MG/DL (ref 8–22)
BURR CELLS BLD QL SMEAR: ABNORMAL
CALCIUM ALBUM COR SERPL-MCNC: 9.1 MG/DL (ref 8.5–10.5)
CALCIUM SERPL-MCNC: 8 MG/DL (ref 8.4–10.2)
CALCIUM SERPL-MCNC: 8 MG/DL (ref 8.4–10.2)
CALCIUM SERPL-MCNC: 8.2 MG/DL (ref 8.4–10.2)
CHLORIDE SERPL-SCNC: 102 MMOL/L (ref 96–112)
CHLORIDE SERPL-SCNC: 103 MMOL/L (ref 96–112)
CHLORIDE SERPL-SCNC: 103 MMOL/L (ref 96–112)
CO2 SERPL-SCNC: 18 MMOL/L (ref 20–33)
CO2 SERPL-SCNC: 21 MMOL/L (ref 20–33)
CO2 SERPL-SCNC: 21 MMOL/L (ref 20–33)
CREAT SERPL-MCNC: 0.67 MG/DL (ref 0.5–1.4)
CREAT SERPL-MCNC: 0.7 MG/DL (ref 0.5–1.4)
CREAT SERPL-MCNC: 0.71 MG/DL (ref 0.5–1.4)
DSDNA AB TITR SER CLIF: NORMAL {TITER}
EOSINOPHIL # BLD AUTO: 0.04 K/UL (ref 0–0.51)
EOSINOPHIL NFR BLD: 2.2 % (ref 0–6.9)
ERYTHROCYTE [DISTWIDTH] IN BLOOD BY AUTOMATED COUNT: 46.2 FL (ref 35.9–50)
GFR SERPLBLD CREATININE-BSD FMLA CKD-EPI: 107 ML/MIN/1.73 M 2
GFR SERPLBLD CREATININE-BSD FMLA CKD-EPI: 108 ML/MIN/1.73 M 2
GFR SERPLBLD CREATININE-BSD FMLA CKD-EPI: 109 ML/MIN/1.73 M 2
GLUCOSE BLD STRIP.AUTO-MCNC: 120 MG/DL (ref 65–99)
GLUCOSE BLD STRIP.AUTO-MCNC: 121 MG/DL (ref 65–99)
GLUCOSE BLD STRIP.AUTO-MCNC: 140 MG/DL (ref 65–99)
GLUCOSE BLD STRIP.AUTO-MCNC: 152 MG/DL (ref 65–99)
GLUCOSE SERPL-MCNC: 115 MG/DL (ref 65–99)
GLUCOSE SERPL-MCNC: 132 MG/DL (ref 65–99)
GLUCOSE SERPL-MCNC: 163 MG/DL (ref 65–99)
HCT VFR BLD AUTO: 27.2 % (ref 42–52)
HGB BLD-MCNC: 9.4 G/DL (ref 14–18)
LYMPHOCYTES # BLD AUTO: 1.26 K/UL (ref 1–4.8)
LYMPHOCYTES NFR BLD: 66.2 % (ref 22–41)
MAGNESIUM SERPL-MCNC: 2 MG/DL (ref 1.5–2.5)
MANUAL DIFF BLD: ABNORMAL
MCH RBC QN AUTO: 28.6 PG (ref 27–33)
MCHC RBC AUTO-ENTMCNC: 34.6 G/DL (ref 32.3–36.5)
MCV RBC AUTO: 82.7 FL (ref 81.4–97.8)
MICROCYTES BLD QL SMEAR: ABNORMAL
MONOCYTES # BLD AUTO: 0.27 K/UL (ref 0–0.85)
MONOCYTES NFR BLD AUTO: 14 % (ref 0–13.4)
NEUTROPHILS # BLD AUTO: 0.28 K/UL (ref 1.82–7.42)
NEUTROPHILS NFR BLD: 14.7 % (ref 44–72)
OVALOCYTES BLD QL SMEAR: ABNORMAL
PHOSPHATE SERPL-MCNC: 3 MG/DL (ref 2.5–4.5)
PLATELET # BLD AUTO: 165 K/UL (ref 164–446)
PLATELET BLD QL SMEAR: NORMAL
PMV BLD AUTO: 11.2 FL (ref 9–12.9)
POTASSIUM SERPL-SCNC: 3.6 MMOL/L (ref 3.6–5.5)
POTASSIUM SERPL-SCNC: 3.8 MMOL/L (ref 3.6–5.5)
POTASSIUM SERPL-SCNC: 3.9 MMOL/L (ref 3.6–5.5)
PROCALCITONIN SERPL-MCNC: 0.2 NG/ML
PROT SERPL-MCNC: 5.7 G/DL (ref 6–8.2)
RBC # BLD AUTO: 3.29 M/UL (ref 4.7–6.1)
RBC BLD AUTO: PRESENT
SODIUM SERPL-SCNC: 132 MMOL/L (ref 135–145)
SODIUM SERPL-SCNC: 133 MMOL/L (ref 135–145)
SODIUM SERPL-SCNC: 133 MMOL/L (ref 135–145)
WBC # BLD AUTO: 1.9 K/UL (ref 4.8–10.8)

## 2025-03-04 PROCEDURE — 700102 HCHG RX REV CODE 250 W/ 637 OVERRIDE(OP): Performed by: INTERNAL MEDICINE

## 2025-03-04 PROCEDURE — 84460 ALANINE AMINO (ALT) (SGPT): CPT

## 2025-03-04 PROCEDURE — 85007 BL SMEAR W/DIFF WBC COUNT: CPT

## 2025-03-04 PROCEDURE — 84450 TRANSFERASE (AST) (SGOT): CPT

## 2025-03-04 PROCEDURE — 82962 GLUCOSE BLOOD TEST: CPT | Mod: 91

## 2025-03-04 PROCEDURE — 99233 SBSQ HOSP IP/OBS HIGH 50: CPT | Performed by: INTERNAL MEDICINE

## 2025-03-04 PROCEDURE — 85027 COMPLETE CBC AUTOMATED: CPT

## 2025-03-04 PROCEDURE — 84075 ASSAY ALKALINE PHOSPHATASE: CPT

## 2025-03-04 PROCEDURE — A9270 NON-COVERED ITEM OR SERVICE: HCPCS | Performed by: INTERNAL MEDICINE

## 2025-03-04 PROCEDURE — 83735 ASSAY OF MAGNESIUM: CPT

## 2025-03-04 PROCEDURE — 80069 RENAL FUNCTION PANEL: CPT

## 2025-03-04 PROCEDURE — 700111 HCHG RX REV CODE 636 W/ 250 OVERRIDE (IP): Mod: JZ | Performed by: INTERNAL MEDICINE

## 2025-03-04 PROCEDURE — 700105 HCHG RX REV CODE 258: Performed by: INTERNAL MEDICINE

## 2025-03-04 PROCEDURE — 770020 HCHG ROOM/CARE - TELE (206)

## 2025-03-04 PROCEDURE — 36415 COLL VENOUS BLD VENIPUNCTURE: CPT

## 2025-03-04 PROCEDURE — 82248 BILIRUBIN DIRECT: CPT

## 2025-03-04 PROCEDURE — 82247 BILIRUBIN TOTAL: CPT

## 2025-03-04 PROCEDURE — 80048 BASIC METABOLIC PNL TOTAL CA: CPT

## 2025-03-04 PROCEDURE — 84145 PROCALCITONIN (PCT): CPT

## 2025-03-04 PROCEDURE — 94760 N-INVAS EAR/PLS OXIMETRY 1: CPT

## 2025-03-04 PROCEDURE — 84155 ASSAY OF PROTEIN SERUM: CPT

## 2025-03-04 RX ADMIN — PREGABALIN 75 MG: 75 CAPSULE ORAL at 11:32

## 2025-03-04 RX ADMIN — SITAGLIPTIN 100 MG: 50 TABLET, FILM COATED ORAL at 05:59

## 2025-03-04 RX ADMIN — PREGABALIN 75 MG: 75 CAPSULE ORAL at 05:59

## 2025-03-04 RX ADMIN — PREGABALIN 75 MG: 75 CAPSULE ORAL at 16:59

## 2025-03-04 RX ADMIN — OSELTAMIVIR PHOSPHATE 75 MG: 75 CAPSULE ORAL at 16:59

## 2025-03-04 RX ADMIN — FENOFIBRATE 134 MG: 134 CAPSULE ORAL at 05:59

## 2025-03-04 RX ADMIN — CARVEDILOL 12.5 MG: 6.25 TABLET, FILM COATED ORAL at 16:59

## 2025-03-04 RX ADMIN — CARVEDILOL 12.5 MG: 6.25 TABLET, FILM COATED ORAL at 07:50

## 2025-03-04 RX ADMIN — ATORVASTATIN CALCIUM 80 MG: 40 TABLET, FILM COATED ORAL at 16:59

## 2025-03-04 RX ADMIN — CEFEPIME 2 G: 2 INJECTION, POWDER, FOR SOLUTION INTRAVENOUS at 21:05

## 2025-03-04 RX ADMIN — INSULIN LISPRO 1 UNITS: 100 INJECTION, SOLUTION INTRAVENOUS; SUBCUTANEOUS at 17:03

## 2025-03-04 RX ADMIN — ASPIRIN 81 MG: 81 TABLET, COATED ORAL at 05:59

## 2025-03-04 RX ADMIN — CEFEPIME 2 G: 2 INJECTION, POWDER, FOR SOLUTION INTRAVENOUS at 05:59

## 2025-03-04 RX ADMIN — ENOXAPARIN SODIUM 40 MG: 100 INJECTION SUBCUTANEOUS at 16:59

## 2025-03-04 RX ADMIN — CEFEPIME 2 G: 2 INJECTION, POWDER, FOR SOLUTION INTRAVENOUS at 14:37

## 2025-03-04 RX ADMIN — OSELTAMIVIR PHOSPHATE 75 MG: 75 CAPSULE ORAL at 05:59

## 2025-03-04 ASSESSMENT — ENCOUNTER SYMPTOMS
WEAKNESS: 1
ABDOMINAL PAIN: 0
VOMITING: 0
COUGH: 1
FEVER: 0
SPUTUM PRODUCTION: 0
NERVOUS/ANXIOUS: 0
CONSTIPATION: 0
HEADACHES: 0
DEPRESSION: 0
PHOTOPHOBIA: 0
SORE THROAT: 0
INSOMNIA: 0
DIARRHEA: 0
HEARTBURN: 0
MYALGIAS: 0
SPEECH CHANGE: 0
SHORTNESS OF BREATH: 0
BLURRED VISION: 0
CHILLS: 0
CLAUDICATION: 0
DIZZINESS: 0
SENSORY CHANGE: 0

## 2025-03-04 ASSESSMENT — PAIN DESCRIPTION - PAIN TYPE: TYPE: ACUTE PAIN

## 2025-03-04 ASSESSMENT — FIBROSIS 4 INDEX: FIB4 SCORE: 4.46
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
## 2025-03-04 NOTE — PROGRESS NOTES
Telemetry Shift Summary     Rhythm: SR  Rate: 70s-80s  Measurements: 0.16/0.08/0.40  Ectopy (reported by Monitor Tech): rare PAC     Normal Values  Rhythm: Sinus  HR:   Measurements: 0.12-0.20/0.06-0.10/0.30-0.52
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
## 2025-03-04 NOTE — CARE PLAN
The patient is Stable - Low risk of patient condition declining or worsening    Shift Goals  Clinical Goals: Monitor Na, Isolation  Patient Goals: Comfort    Progress made toward(s) clinical / shift goals:    Problem: Knowledge Deficit - Standard  Goal: Patient and family/care givers will demonstrate understanding of plan of care, disease process/condition, diagnostic tests and medications  Outcome: Progressing     Problem: Fall Risk  Goal: Patient will remain free from falls  Outcome: Progressing       Patient is not progressing towards the following goals:

## 2025-03-04 NOTE — CARE PLAN
The patient is Stable - Low risk of patient condition declining or worsening    Shift Goals  Clinical Goals: Monitor NA+, continue isolation, safety  Patient Goals: rest    Progress made toward(s) clinical / shift goals:  Sodium checked Q12 per MD ordered. Safety precautions in place.      Problem: Knowledge Deficit - Standard  Goal: Patient and family/care givers will demonstrate understanding of plan of care, disease process/condition, diagnostic tests and medications  Outcome: Progressing     Problem: Fall Risk  Goal: Patient will remain free from falls  Outcome: Progressing     Problem: Respiratory  Goal: Patient will achieve/maintain optimum respiratory ventilation and gas exchange  Outcome: Progressing     Problem: Neuro Status  Goal: Neuro status will remain stable or improve  Outcome: Progressing     Problem: Hemodynamics  Goal: Patient's hemodynamics, fluid balance and neurologic status will be stable or improve  Outcome: Progressing     Problem: Physical Regulation  Goal: Diagnostic test results will improve  Outcome: Progressing  Goal: Signs and symptoms of infection will decrease  Outcome: Progressing

## 2025-03-04 NOTE — PROGRESS NOTES
Telemetry Shift Summary     Rhythm: SR  HR: 69-87  Ectopy: none    Measurements: 0.16/0.08/0.40    Normal Values  Rhythm: SR  HR:   Measurements: 0.12-0.20/0.08-0.10/0.30-0.52

## 2025-03-05 LAB
ALBUMIN SERPL BCP-MCNC: 2.7 G/DL (ref 3.2–4.9)
ALBUMIN/GLOB SERPL: 0.8 G/DL
ALP SERPL-CCNC: 50 U/L (ref 30–99)
ALT SERPL-CCNC: 30 U/L (ref 2–50)
ANION GAP SERPL CALC-SCNC: 11 MMOL/L (ref 7–16)
AST SERPL-CCNC: 40 U/L (ref 12–45)
BASOPHILS # BLD AUTO: 0 % (ref 0–1.8)
BASOPHILS # BLD: 0 K/UL (ref 0–0.12)
BILIRUB SERPL-MCNC: 0.6 MG/DL (ref 0.1–1.5)
BUN SERPL-MCNC: 9 MG/DL (ref 8–22)
CALCIUM ALBUM COR SERPL-MCNC: 9.3 MG/DL (ref 8.5–10.5)
CALCIUM SERPL-MCNC: 8.3 MG/DL (ref 8.4–10.2)
CHLORIDE SERPL-SCNC: 102 MMOL/L (ref 96–112)
CO2 SERPL-SCNC: 19 MMOL/L (ref 20–33)
CREAT SERPL-MCNC: 0.63 MG/DL (ref 0.5–1.4)
EOSINOPHIL # BLD AUTO: 0.02 K/UL (ref 0–0.51)
EOSINOPHIL NFR BLD: 1.1 % (ref 0–6.9)
ERYTHROCYTE [DISTWIDTH] IN BLOOD BY AUTOMATED COUNT: 47.3 FL (ref 35.9–50)
GFR SERPLBLD CREATININE-BSD FMLA CKD-EPI: 111 ML/MIN/1.73 M 2
GLOBULIN SER CALC-MCNC: 3.2 G/DL (ref 1.9–3.5)
GLUCOSE BLD STRIP.AUTO-MCNC: 102 MG/DL (ref 65–99)
GLUCOSE BLD STRIP.AUTO-MCNC: 125 MG/DL (ref 65–99)
GLUCOSE BLD STRIP.AUTO-MCNC: 192 MG/DL (ref 65–99)
GLUCOSE BLD STRIP.AUTO-MCNC: 202 MG/DL (ref 65–99)
GLUCOSE SERPL-MCNC: 123 MG/DL (ref 65–99)
HCT VFR BLD AUTO: 27.9 % (ref 42–52)
HGB BLD-MCNC: 9.5 G/DL (ref 14–18)
IMM GRANULOCYTES # BLD AUTO: 0.01 K/UL (ref 0–0.11)
IMM GRANULOCYTES NFR BLD AUTO: 0.5 % (ref 0–0.9)
LYMPHOCYTES # BLD AUTO: 1.34 K/UL (ref 1–4.8)
LYMPHOCYTES NFR BLD: 73.6 % (ref 22–41)
MCH RBC QN AUTO: 28.7 PG (ref 27–33)
MCHC RBC AUTO-ENTMCNC: 34.1 G/DL (ref 32.3–36.5)
MCV RBC AUTO: 84.3 FL (ref 81.4–97.8)
MONOCYTES # BLD AUTO: 0.26 K/UL (ref 0–0.85)
MONOCYTES NFR BLD AUTO: 14.3 % (ref 0–13.4)
MYELOPEROXIDASE AB SER-ACNC: 0 AU/ML (ref 0–19)
NEUTROPHILS # BLD AUTO: 0.19 K/UL (ref 1.82–7.42)
NEUTROPHILS NFR BLD: 10.5 % (ref 44–72)
NRBC # BLD AUTO: 0 K/UL
NRBC BLD-RTO: 0 /100 WBC (ref 0–0.2)
PLATELET # BLD AUTO: 206 K/UL (ref 164–446)
PMV BLD AUTO: 10.7 FL (ref 9–12.9)
POTASSIUM SERPL-SCNC: 3.7 MMOL/L (ref 3.6–5.5)
PROT SERPL-MCNC: 5.9 G/DL (ref 6–8.2)
PROTEINASE3 AB SER-ACNC: 0 AU/ML (ref 0–19)
RBC # BLD AUTO: 3.31 M/UL (ref 4.7–6.1)
SODIUM SERPL-SCNC: 132 MMOL/L (ref 135–145)
WBC # BLD AUTO: 1.8 K/UL (ref 4.8–10.8)

## 2025-03-05 PROCEDURE — 85025 COMPLETE CBC W/AUTO DIFF WBC: CPT

## 2025-03-05 PROCEDURE — 80053 COMPREHEN METABOLIC PANEL: CPT

## 2025-03-05 PROCEDURE — 700102 HCHG RX REV CODE 250 W/ 637 OVERRIDE(OP): Performed by: INTERNAL MEDICINE

## 2025-03-05 PROCEDURE — 36415 COLL VENOUS BLD VENIPUNCTURE: CPT

## 2025-03-05 PROCEDURE — 770020 HCHG ROOM/CARE - TELE (206)

## 2025-03-05 PROCEDURE — 82962 GLUCOSE BLOOD TEST: CPT | Mod: 91

## 2025-03-05 PROCEDURE — A9270 NON-COVERED ITEM OR SERVICE: HCPCS | Performed by: INTERNAL MEDICINE

## 2025-03-05 PROCEDURE — 700105 HCHG RX REV CODE 258: Performed by: INTERNAL MEDICINE

## 2025-03-05 PROCEDURE — 94760 N-INVAS EAR/PLS OXIMETRY 1: CPT

## 2025-03-05 PROCEDURE — 99233 SBSQ HOSP IP/OBS HIGH 50: CPT | Performed by: INTERNAL MEDICINE

## 2025-03-05 PROCEDURE — 700111 HCHG RX REV CODE 636 W/ 250 OVERRIDE (IP): Mod: JZ | Performed by: INTERNAL MEDICINE

## 2025-03-05 RX ADMIN — OSELTAMIVIR PHOSPHATE 75 MG: 75 CAPSULE ORAL at 05:48

## 2025-03-05 RX ADMIN — PREGABALIN 75 MG: 75 CAPSULE ORAL at 12:17

## 2025-03-05 RX ADMIN — FENOFIBRATE 134 MG: 134 CAPSULE ORAL at 05:48

## 2025-03-05 RX ADMIN — SITAGLIPTIN 100 MG: 50 TABLET, FILM COATED ORAL at 05:48

## 2025-03-05 RX ADMIN — OSELTAMIVIR PHOSPHATE 75 MG: 75 CAPSULE ORAL at 17:13

## 2025-03-05 RX ADMIN — CARVEDILOL 12.5 MG: 6.25 TABLET, FILM COATED ORAL at 17:13

## 2025-03-05 RX ADMIN — ATORVASTATIN CALCIUM 80 MG: 40 TABLET, FILM COATED ORAL at 17:13

## 2025-03-05 RX ADMIN — PREGABALIN 75 MG: 75 CAPSULE ORAL at 05:48

## 2025-03-05 RX ADMIN — INSULIN LISPRO 1 UNITS: 100 INJECTION, SOLUTION INTRAVENOUS; SUBCUTANEOUS at 20:26

## 2025-03-05 RX ADMIN — CEFEPIME 2 G: 2 INJECTION, POWDER, FOR SOLUTION INTRAVENOUS at 05:51

## 2025-03-05 RX ADMIN — ASPIRIN 81 MG: 81 TABLET, COATED ORAL at 05:48

## 2025-03-05 RX ADMIN — PREGABALIN 75 MG: 75 CAPSULE ORAL at 17:13

## 2025-03-05 RX ADMIN — ENOXAPARIN SODIUM 40 MG: 100 INJECTION SUBCUTANEOUS at 17:13

## 2025-03-05 RX ADMIN — CARVEDILOL 12.5 MG: 6.25 TABLET, FILM COATED ORAL at 07:50

## 2025-03-05 ASSESSMENT — ENCOUNTER SYMPTOMS
INSOMNIA: 0
DIZZINESS: 0
CHILLS: 0
SPEECH CHANGE: 0
MYALGIAS: 0
DIARRHEA: 0
CONSTIPATION: 0
SENSORY CHANGE: 0
NERVOUS/ANXIOUS: 0
WEAKNESS: 1
VOMITING: 0
CLAUDICATION: 0
SORE THROAT: 0
PHOTOPHOBIA: 0
BLURRED VISION: 0
HEADACHES: 0
SHORTNESS OF BREATH: 0
HEARTBURN: 0
FEVER: 0
ABDOMINAL PAIN: 0
COUGH: 1
SPUTUM PRODUCTION: 0
DEPRESSION: 0

## 2025-03-05 ASSESSMENT — PAIN DESCRIPTION - PAIN TYPE
TYPE: ACUTE PAIN
TYPE: ACUTE PAIN

## 2025-03-05 ASSESSMENT — FIBROSIS 4 INDEX: FIB4 SCORE: 2.48

## 2025-03-05 NOTE — DIETARY
Nutrition Services: Follow-up for PO intake   Day 4 of admit. José Mohr is a 54 y.o. male with admitting DX of Sepsis (HCC) [A41.9]     Current diet order:   Consistent CHO diet     Recorded PO intake has been good at % of meals.      Nutrition Dx: Inadequate Oral Intake related to flu as evidenced by recent reported poor oral intake, PO intake now improving.      Nutrition Dx Status: resolved     Problem: Nutritional:  Goal: Achieve adequate nutritional intake  Description: Patient will consume >50% of meals  Outcome: met      Plan/ Recommendations:      Encourage intake of meals, goal for >50% consumption from meals and/or supplements  Document intake of all meals as % taken in ADL's to provide interdisciplinary communication across all shifts.   Monitor weight.  Nutrition rep available to see patient for ongoing meal and snack preferences.  Obtain supplement order per RD as needed.     RD following

## 2025-03-05 NOTE — PROGRESS NOTES
Telemetry Shift Summary     Rhythm: SR  Rate: 70s  Measurements: 0.16/0.08/0.40  Ectopy (reported by Monitor Tech): rare PVC/PAC     Normal Values  Rhythm: Sinus  HR:   Measurements: 0.12-0.20/0.06-0.10/0.30-0.52

## 2025-03-05 NOTE — PROGRESS NOTES
Telemetry Shift Summary    Rhythm SR  HR Range 61-89  Ectopy [facility]  Measurement .16/.08/.40      Normal Values  Rhythm SR  HR Range   Measurement 0.12-.020 / 0.06-0.10 / 0.30-0.52

## 2025-03-05 NOTE — PROGRESS NOTES
Attempted to call report to [place]. RN is at lunch. This nurse left extension for RN to call and get report when done with lunch.
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
## 2025-03-05 NOTE — CARE PLAN
The patient is Stable - Low risk of patient condition declining or worsening    Shift Goals  Clinical Goals: IV ABx, Labs  Patient Goals: Comfort    Progress made toward(s) clinical / shift goals:    Problem: Knowledge Deficit - Standard  Goal: Patient and family/care givers will demonstrate understanding of plan of care, disease process/condition, diagnostic tests and medications  Outcome: Progressing     Problem: Fall Risk  Goal: Patient will remain free from falls  Outcome: Progressing     Problem: Hemodynamics  Goal: Patient's hemodynamics, fluid balance and neurologic status will be stable or improve  Outcome: Progressing       Patient is not progressing towards the following goals:

## 2025-03-05 NOTE — DISCHARGE PLANNING
Case Management Discharge Planning    Admission Date: 3/1/2025  GMLOS: 4.9  ALOS: 4    6-Clicks ADL Score: 20  6-Clicks Mobility Score: 19      Anticipated Discharge Dispo: Discharge Disposition: D/T to home under HHA care in anticipation of covered skilled care (06)  Discharge Address: [address] [facility] [address]  [place] [address]    DME Needed: No    Action(s) Taken:     Spoke to Venkata from [facility]. Venkata inquiring if pt would be okay with using [employer] insurance for HH instead of workers comp as workers comp will likely decline auth.    Spoke to pt at bedside. Pt agreeable to requesting HH auth from [place]. Venkata from [facility] updated via phone call.    Escalations Completed: None    Medically Clear: No    Next Steps: Follow up with [facility] regarding HH acceptance.    Barriers to Discharge: Medical clearance

## 2025-03-06 ENCOUNTER — APPOINTMENT (OUTPATIENT)
Dept: RADIOLOGY | Facility: MEDICAL CENTER | Age: 55
DRG: 871 | End: 2025-03-06
Attending: INTERNAL MEDICINE
Payer: COMMERCIAL

## 2025-03-06 LAB
ANION GAP SERPL CALC-SCNC: 11 MMOL/L (ref 7–16)
ANISOCYTOSIS BLD QL SMEAR: ABNORMAL
BASOPHILS # BLD AUTO: 1.7 % (ref 0–1.8)
BASOPHILS # BLD: 0.04 K/UL (ref 0–0.12)
BUN SERPL-MCNC: 7 MG/DL (ref 8–22)
CALCIUM SERPL-MCNC: 8.5 MG/DL (ref 8.4–10.2)
CENTROMERE IGG TITR SER IF: 1 AU/ML (ref 0–40)
CHLORIDE SERPL-SCNC: 103 MMOL/L (ref 96–112)
CO2 SERPL-SCNC: 21 MMOL/L (ref 20–33)
COMMENT NL1176: NORMAL
CREAT SERPL-MCNC: 0.72 MG/DL (ref 0.5–1.4)
ENA JO1 AB TITR SER: 2 AU/ML (ref 0–40)
ENA SCL70 IGG SER QL: 1 AU/ML (ref 0–40)
ENA SM IGG SER-ACNC: 1 AU/ML (ref 0–40)
ENA SS-A 60KD AB SER-ACNC: 0 AU/ML (ref 0–40)
ENA SS-A IGG SER QL: 1 AU/ML (ref 0–40)
ENA SS-B IGG SER IA-ACNC: 1 AU/ML (ref 0–40)
EOSINOPHIL # BLD AUTO: 0.05 K/UL (ref 0–0.51)
EOSINOPHIL NFR BLD: 2.6 % (ref 0–6.9)
ERYTHROCYTE [DISTWIDTH] IN BLOOD BY AUTOMATED COUNT: 47.8 FL (ref 35.9–50)
GFR SERPLBLD CREATININE-BSD FMLA CKD-EPI: 107 ML/MIN/1.73 M 2
GLUCOSE BLD STRIP.AUTO-MCNC: 108 MG/DL (ref 65–99)
GLUCOSE BLD STRIP.AUTO-MCNC: 127 MG/DL (ref 65–99)
GLUCOSE BLD STRIP.AUTO-MCNC: 179 MG/DL (ref 65–99)
GLUCOSE SERPL-MCNC: 166 MG/DL (ref 65–99)
HCT VFR BLD AUTO: 29.6 % (ref 42–52)
HGB BLD-MCNC: 9.8 G/DL (ref 14–18)
LYMPHOCYTES # BLD AUTO: 1.67 K/UL (ref 1–4.8)
LYMPHOCYTES NFR BLD: 79.3 % (ref 22–41)
MANUAL DIFF BLD: NORMAL
MCH RBC QN AUTO: 28.2 PG (ref 27–33)
MCHC RBC AUTO-ENTMCNC: 33.1 G/DL (ref 32.3–36.5)
MCV RBC AUTO: 85.3 FL (ref 81.4–97.8)
MICROCYTES BLD QL SMEAR: ABNORMAL
MONOCYTES # BLD AUTO: 0.18 K/UL (ref 0–0.85)
MONOCYTES NFR BLD AUTO: 8.6 % (ref 0–13.4)
NEUTROPHILS # BLD AUTO: 0.16 K/UL (ref 1.82–7.42)
NEUTROPHILS NFR BLD: 7.8 % (ref 44–72)
NRBC # BLD AUTO: 0.02 K/UL
NRBC BLD-RTO: 0.9 /100 WBC (ref 0–0.2)
PLATELET # BLD AUTO: 255 K/UL (ref 164–446)
PLATELET BLD QL SMEAR: NORMAL
PMV BLD AUTO: 10.3 FL (ref 9–12.9)
POTASSIUM SERPL-SCNC: 3.7 MMOL/L (ref 3.6–5.5)
RBC # BLD AUTO: 3.47 M/UL (ref 4.7–6.1)
RBC BLD AUTO: PRESENT
RIBOSOMAL P AB SER-ACNC: 2 AU/ML (ref 0–40)
SMUDGE CELLS BLD QL SMEAR: NORMAL
SODIUM SERPL-SCNC: 135 MMOL/L (ref 135–145)
U1 SNRNP IGG SER QL: 8 UNITS (ref 0–19)
WBC # BLD AUTO: 2.1 K/UL (ref 4.8–10.8)

## 2025-03-06 PROCEDURE — A9270 NON-COVERED ITEM OR SERVICE: HCPCS | Performed by: INTERNAL MEDICINE

## 2025-03-06 PROCEDURE — 82962 GLUCOSE BLOOD TEST: CPT

## 2025-03-06 PROCEDURE — 85007 BL SMEAR W/DIFF WBC COUNT: CPT

## 2025-03-06 PROCEDURE — 94760 N-INVAS EAR/PLS OXIMETRY 1: CPT

## 2025-03-06 PROCEDURE — 700111 HCHG RX REV CODE 636 W/ 250 OVERRIDE (IP): Mod: JZ | Performed by: INTERNAL MEDICINE

## 2025-03-06 PROCEDURE — 99233 SBSQ HOSP IP/OBS HIGH 50: CPT | Performed by: INTERNAL MEDICINE

## 2025-03-06 PROCEDURE — 700102 HCHG RX REV CODE 250 W/ 637 OVERRIDE(OP): Performed by: INTERNAL MEDICINE

## 2025-03-06 PROCEDURE — 85027 COMPLETE CBC AUTOMATED: CPT

## 2025-03-06 PROCEDURE — 36415 COLL VENOUS BLD VENIPUNCTURE: CPT

## 2025-03-06 PROCEDURE — 71045 X-RAY EXAM CHEST 1 VIEW: CPT

## 2025-03-06 PROCEDURE — 770020 HCHG ROOM/CARE - TELE (206)

## 2025-03-06 PROCEDURE — 80048 BASIC METABOLIC PNL TOTAL CA: CPT

## 2025-03-06 RX ADMIN — PREGABALIN 75 MG: 75 CAPSULE ORAL at 19:15

## 2025-03-06 RX ADMIN — ASPIRIN 81 MG: 81 TABLET, COATED ORAL at 05:11

## 2025-03-06 RX ADMIN — CARVEDILOL 12.5 MG: 6.25 TABLET, FILM COATED ORAL at 19:15

## 2025-03-06 RX ADMIN — ENOXAPARIN SODIUM 40 MG: 100 INJECTION SUBCUTANEOUS at 19:16

## 2025-03-06 RX ADMIN — LISINOPRIL 5 MG: 5 TABLET ORAL at 08:26

## 2025-03-06 RX ADMIN — ATORVASTATIN CALCIUM 80 MG: 40 TABLET, FILM COATED ORAL at 19:15

## 2025-03-06 RX ADMIN — PREGABALIN 75 MG: 75 CAPSULE ORAL at 05:11

## 2025-03-06 RX ADMIN — OSELTAMIVIR PHOSPHATE 75 MG: 75 CAPSULE ORAL at 19:15

## 2025-03-06 RX ADMIN — PREGABALIN 75 MG: 75 CAPSULE ORAL at 11:39

## 2025-03-06 RX ADMIN — OSELTAMIVIR PHOSPHATE 75 MG: 75 CAPSULE ORAL at 05:11

## 2025-03-06 RX ADMIN — CARVEDILOL 12.5 MG: 6.25 TABLET, FILM COATED ORAL at 08:26

## 2025-03-06 RX ADMIN — TBO-FILGRASTIM 480 MCG: 480 INJECTION, SOLUTION SUBCUTANEOUS at 08:26

## 2025-03-06 RX ADMIN — INSULIN LISPRO 1 UNITS: 100 INJECTION, SOLUTION INTRAVENOUS; SUBCUTANEOUS at 11:39

## 2025-03-06 RX ADMIN — SITAGLIPTIN 100 MG: 50 TABLET, FILM COATED ORAL at 05:11

## 2025-03-06 RX ADMIN — FENOFIBRATE 134 MG: 134 CAPSULE ORAL at 05:12

## 2025-03-06 RX ADMIN — ACETAMINOPHEN 1000 MG: 500 TABLET ORAL at 18:18

## 2025-03-06 ASSESSMENT — PAIN DESCRIPTION - PAIN TYPE
TYPE: ACUTE PAIN

## 2025-03-06 ASSESSMENT — ENCOUNTER SYMPTOMS
MYALGIAS: 0
DIARRHEA: 0
CONSTIPATION: 0
PHOTOPHOBIA: 0
HEARTBURN: 0
COUGH: 1
WEAKNESS: 1
NERVOUS/ANXIOUS: 0
SPEECH CHANGE: 0
SENSORY CHANGE: 0
SHORTNESS OF BREATH: 0
FEVER: 0
BLURRED VISION: 0
SORE THROAT: 0
CHILLS: 0
SPUTUM PRODUCTION: 0
VOMITING: 0
CLAUDICATION: 0
DEPRESSION: 0
INSOMNIA: 0
ABDOMINAL PAIN: 0
HEADACHES: 0
DIZZINESS: 0

## 2025-03-06 ASSESSMENT — FIBROSIS 4 INDEX: FIB4 SCORE: 1.99

## 2025-03-06 NOTE — CARE PLAN
The patient is Stable - Low risk of patient condition declining or worsening    Shift Goals  Clinical Goals: iv abx, monitor vitals, monitor labs  Patient Goals: sleep    Progress made toward(s) clinical / shift goals:        Problem: Knowledge Deficit - Standard  Goal: Patient and family/care givers will demonstrate understanding of plan of care, disease process/condition, diagnostic tests and medications  Outcome: Progressing     Problem: Fall Risk  Goal: Patient will remain free from falls  Outcome: Progressing     Problem: Respiratory  Goal: Patient will achieve/maintain optimum respiratory ventilation and gas exchange  Outcome: Progressing     Problem: Fluid Volume  Goal: Fluid volume balance will be maintained  Outcome: Progressing     Problem: Physical Regulation  Goal: Diagnostic test results will improve  Outcome: Progressing       Patient is not progressing towards the following goals:

## 2025-03-06 NOTE — THERAPY
Occupational Therapy  Discharge      Patient Name: José Mohr  Age:  54 y.o., Sex:  male  Medical Record #: [id number]  Today's Date: 3/6/2025     Precautions  Precautions: Fall Risk    Plan    Reason for Discharge From Therapy:      DC Equipment Recommendations: None  Discharge Recommendations: Recommend home health for continued occupational therapy services       03/06/25 1137   Treatment Variance   Reason For Missed Therapy Non-Medical - Other (Please Comment)  (up ad brent, no further apparent OT needs)   Interdisciplinary Plan of Care Collaboration   IDT Collaboration with  Nursing   Collaboration Comments RN reports pt is up self in room, mobilizing well.  Has support from family.  Does not appear to have further OT needs at this time, will d/c from OT services.

## 2025-03-06 NOTE — PROGRESS NOTES
0700 Received report from Night shift nurse  0825 Performed assessment  Pt is A&Ox4, no complaints of pain, Updated on POC: monitor WBC/neutrophils, maintain ISO precautions, mobilize as tolerated  Call light within reach, hourly rounding in place, bed in lowest position.    1808: pt started acting weird during med pass, turning to his side, delayed responses, having slight on and off tremors, grimacing occasionally (as if he was in pain), pt was asked if he was in pain, pt declined. Temporal temp taken 105.5F, oral temp taken at 103F, resp in high 30's and tachycardic in 120's. Dr. Matute notified and this RN was told to call a rapid. CXR done, labs drawn, medication per MAR given. M.D. at bedside.

## 2025-03-06 NOTE — PROGRESS NOTES
ISOLATION PRECAUTIONS EDUCATION    Educated PATIENT, FAMILY, S.O: patient and family member on isolation for OTHER (H1N1)    Educated on reason for isolation, how the infection may be transmitted, and how to help prevent transmission to others. Educated precautions involves staff and visitors wearing PPE, following Standard Precautions and performing meticulous hand hygiene in order to prevent transmission of infection.     Contact Precautions: Educated that Contact Precautions involves staff and visitors wearing gowns and gloves when in the patient room.     In addition, educated that the patient may leave the room, but prior to exiting the patient room each time, the patient needs to have on a fresh patient gown, ensure the potentially infectious area is covered, and perform hand hygiene with soap and water or alcohol-based hand rub, immediately prior to exiting the room.    Droplet Precautions: Educated that Droplet Precautions involves staff and visitors wearing PPE to include a surgical mask when in the patient room.     In addition, educated that they may leave their room, but prior to exiting the patient room each time, the patient needs to have on a fresh patient gown, a surgical mask must be worn by the patient while out of the patient room, and perform hand hygiene immediately prior to exiting the room.     Patient transport and mobilization on unit  Educated that they may leave their room, but prior to exiting, the patient needs to have on a fresh patient gown, ensure the potentially infectious area is covered, performing appropriate hand hygiene immediately prior to exiting the room.
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
## 2025-03-06 NOTE — PROGRESS NOTES
Telemetry Shift Summary     Rhythm: SR  Rate: 61-88  Measurements: 0.18/0.08/0.40  Ectopy (reported by Monitor Tech): [facility]     Normal Values  Rhythm: Sinus  HR:   Measurements: 0.12-0.20/0.06-0.10/0.30-0.52

## 2025-03-06 NOTE — PROGRESS NOTES
Telemetry Shift Summary    Rhythm SR  HR Range 64-81  Ectopy rPVC, Coup  Measurement .18/. 10/.42    Normal Values  Rhythm SR  HR Range   Measurement 0.12-.020 / 0.06-0.10 / 0.30-0.52

## 2025-03-07 LAB
ANION GAP SERPL CALC-SCNC: 12 MMOL/L (ref 7–16)
ANISOCYTOSIS BLD QL SMEAR: ABNORMAL
BACTERIA BLD CULT: NORMAL
BACTERIA BLD CULT: NORMAL
BASOPHILS # BLD AUTO: 0 % (ref 0–1.8)
BASOPHILS # BLD: 0 K/UL (ref 0–0.12)
BUN SERPL-MCNC: 11 MG/DL (ref 8–22)
CALCIUM SERPL-MCNC: 9 MG/DL (ref 8.4–10.2)
CHLORIDE SERPL-SCNC: 98 MMOL/L (ref 96–112)
CO2 SERPL-SCNC: 20 MMOL/L (ref 20–33)
COMMENT NL1176: NORMAL
CREAT SERPL-MCNC: 0.92 MG/DL (ref 0.5–1.4)
EOSINOPHIL # BLD AUTO: 0.02 K/UL (ref 0–0.51)
EOSINOPHIL NFR BLD: 0.9 % (ref 0–6.9)
ERYTHROCYTE [DISTWIDTH] IN BLOOD BY AUTOMATED COUNT: 48.2 FL (ref 35.9–50)
GFR SERPLBLD CREATININE-BSD FMLA CKD-EPI: 97 ML/MIN/1.73 M 2
GLUCOSE BLD STRIP.AUTO-MCNC: 113 MG/DL (ref 65–99)
GLUCOSE BLD STRIP.AUTO-MCNC: 127 MG/DL (ref 65–99)
GLUCOSE BLD STRIP.AUTO-MCNC: 134 MG/DL (ref 65–99)
GLUCOSE BLD STRIP.AUTO-MCNC: 186 MG/DL (ref 65–99)
GLUCOSE SERPL-MCNC: 120 MG/DL (ref 65–99)
HCT VFR BLD AUTO: 33.9 % (ref 42–52)
HGB BLD-MCNC: 11.4 G/DL (ref 14–18)
LG PLATELETS BLD QL SMEAR: NORMAL
LYMPHOCYTES # BLD AUTO: 1.89 K/UL (ref 1–4.8)
LYMPHOCYTES NFR BLD: 75.4 % (ref 22–41)
MANUAL DIFF BLD: NORMAL
MCH RBC QN AUTO: 28.3 PG (ref 27–33)
MCHC RBC AUTO-ENTMCNC: 33.6 G/DL (ref 32.3–36.5)
MCV RBC AUTO: 84.1 FL (ref 81.4–97.8)
MICROCYTES BLD QL SMEAR: ABNORMAL
MONOCYTES # BLD AUTO: 0.34 K/UL (ref 0–0.85)
MONOCYTES NFR BLD AUTO: 13.6 % (ref 0–13.4)
NEUTROPHILS # BLD AUTO: 0.23 K/UL (ref 1.82–7.42)
NEUTROPHILS NFR BLD: 9.3 % (ref 44–72)
NRBC # BLD AUTO: 0.12 K/UL
NRBC BLD-RTO: 4.8 /100 WBC (ref 0–0.2)
OVALOCYTES BLD QL SMEAR: NORMAL
PLATELET # BLD AUTO: 240 K/UL (ref 164–446)
PLATELET BLD QL SMEAR: NORMAL
PMV BLD AUTO: 10.5 FL (ref 9–12.9)
POIKILOCYTOSIS BLD QL SMEAR: NORMAL
POTASSIUM SERPL-SCNC: 4.1 MMOL/L (ref 3.6–5.5)
RBC # BLD AUTO: 4.03 M/UL (ref 4.7–6.1)
RBC BLD AUTO: PRESENT
SIGNIFICANT IND 70042: NORMAL
SIGNIFICANT IND 70042: NORMAL
SITE SITE: NORMAL
SITE SITE: NORMAL
SMUDGE CELLS BLD QL SMEAR: NORMAL
SODIUM SERPL-SCNC: 130 MMOL/L (ref 135–145)
SOURCE SOURCE: NORMAL
SOURCE SOURCE: NORMAL
VARIANT LYMPHS BLD QL SMEAR: NORMAL
WBC # BLD AUTO: 2.5 K/UL (ref 4.8–10.8)
WBC OTHER NFR BLD MANUAL: 0.8 %

## 2025-03-07 PROCEDURE — A9270 NON-COVERED ITEM OR SERVICE: HCPCS | Performed by: INTERNAL MEDICINE

## 2025-03-07 PROCEDURE — 36415 COLL VENOUS BLD VENIPUNCTURE: CPT

## 2025-03-07 PROCEDURE — 700111 HCHG RX REV CODE 636 W/ 250 OVERRIDE (IP): Mod: JZ | Performed by: INTERNAL MEDICINE

## 2025-03-07 PROCEDURE — 82962 GLUCOSE BLOOD TEST: CPT

## 2025-03-07 PROCEDURE — 700102 HCHG RX REV CODE 250 W/ 637 OVERRIDE(OP): Performed by: INTERNAL MEDICINE

## 2025-03-07 PROCEDURE — 94760 N-INVAS EAR/PLS OXIMETRY 1: CPT

## 2025-03-07 PROCEDURE — 99233 SBSQ HOSP IP/OBS HIGH 50: CPT | Performed by: INTERNAL MEDICINE

## 2025-03-07 PROCEDURE — 85007 BL SMEAR W/DIFF WBC COUNT: CPT

## 2025-03-07 PROCEDURE — 80048 BASIC METABOLIC PNL TOTAL CA: CPT

## 2025-03-07 PROCEDURE — 770020 HCHG ROOM/CARE - TELE (206)

## 2025-03-07 PROCEDURE — 85027 COMPLETE CBC AUTOMATED: CPT

## 2025-03-07 RX ORDER — IBUPROFEN 400 MG/1
800 TABLET, FILM COATED ORAL EVERY 6 HOURS PRN
Status: DISCONTINUED | OUTPATIENT
Start: 2025-03-07 | End: 2025-03-08 | Stop reason: HOSPADM

## 2025-03-07 RX ADMIN — INSULIN LISPRO 1 UNITS: 100 INJECTION, SOLUTION INTRAVENOUS; SUBCUTANEOUS at 16:37

## 2025-03-07 RX ADMIN — CARVEDILOL 12.5 MG: 6.25 TABLET, FILM COATED ORAL at 07:43

## 2025-03-07 RX ADMIN — ATORVASTATIN CALCIUM 80 MG: 40 TABLET, FILM COATED ORAL at 16:41

## 2025-03-07 RX ADMIN — ASPIRIN 81 MG: 81 TABLET, COATED ORAL at 06:23

## 2025-03-07 RX ADMIN — ENOXAPARIN SODIUM 40 MG: 100 INJECTION SUBCUTANEOUS at 16:41

## 2025-03-07 RX ADMIN — SITAGLIPTIN 100 MG: 50 TABLET, FILM COATED ORAL at 06:23

## 2025-03-07 RX ADMIN — PREGABALIN 75 MG: 75 CAPSULE ORAL at 06:22

## 2025-03-07 RX ADMIN — PREGABALIN 75 MG: 75 CAPSULE ORAL at 12:46

## 2025-03-07 RX ADMIN — PREGABALIN 75 MG: 75 CAPSULE ORAL at 16:42

## 2025-03-07 RX ADMIN — ACETAMINOPHEN 1000 MG: 500 TABLET ORAL at 07:55

## 2025-03-07 RX ADMIN — OSELTAMIVIR PHOSPHATE 75 MG: 75 CAPSULE ORAL at 06:23

## 2025-03-07 RX ADMIN — FENOFIBRATE 134 MG: 134 CAPSULE ORAL at 06:23

## 2025-03-07 RX ADMIN — CARVEDILOL 12.5 MG: 6.25 TABLET, FILM COATED ORAL at 16:41

## 2025-03-07 ASSESSMENT — ENCOUNTER SYMPTOMS
CLAUDICATION: 0
DIARRHEA: 0
SORE THROAT: 0
VOMITING: 0
BLURRED VISION: 0
SPEECH CHANGE: 0
CONSTIPATION: 0
MYALGIAS: 0
SHORTNESS OF BREATH: 0
ABDOMINAL PAIN: 0
WEAKNESS: 1
INSOMNIA: 0
HEARTBURN: 0
COUGH: 0
SENSORY CHANGE: 0
SPUTUM PRODUCTION: 0
CHILLS: 0
FEVER: 0
NERVOUS/ANXIOUS: 0
DIZZINESS: 0
HEADACHES: 0
DEPRESSION: 0
PHOTOPHOBIA: 0

## 2025-03-07 ASSESSMENT — PAIN DESCRIPTION - PAIN TYPE
TYPE: ACUTE PAIN
TYPE: ACUTE PAIN

## 2025-03-07 NOTE — CARE PLAN
The patient is Stable - Low risk of patient condition declining or worsening    Shift Goals  Clinical Goals: monitor temperature, monitor labs, monitor vitals  Patient Goals: Rest  Family Goals: n/a    Progress made toward(s) clinical / shift goals:    Problem: Knowledge Deficit - Standard  Goal: Patient and family/care givers will demonstrate understanding of plan of care, disease process/condition, diagnostic tests and medications  Description: Target End Date:  1-3 days or as soon as patient condition allows    Document in Patient Education    1.  Patient and family/caregiver oriented to unit, equipment, visitation policy and means for communicating concern  2.  Complete/review Learning Assessment  3.  Assess knowledge level of disease process/condition, treatment plan, diagnostic tests and medications  4.  Explain disease process/condition, treatment plan, diagnostic tests and medications  Outcome: Progressing    Patient verbalized understanding of plan of care. All questions answered.     Problem: Respiratory  Goal: Patient will achieve/maintain optimum respiratory ventilation and gas exchange  Description: Target End Date:  Prior to discharge or change in level of care    Document on Assessment flowsheet    1.  Assess and monitor rate, rhythm, depth and effort of respiration  2.  Breath sounds assessed qshift and/or as needed  3.  Assess O2 saturation, administer/titrate oxygen as ordered  4.  Position patient for maximum ventilatory efficiency  5.  Turn, cough, and deep breath with splinting to improve effectiveness  6.  Collaborate with RT to administer medication/treatments per order  7.  Encourage use of incentive spirometer and encourage patient to cough after use and utilize splinting techniques if applicable  8.  Airway suctioning  9.  Monitor sputum production for changes in color, consistency and frequency  10. Perform frequent oral hygiene  11. Alternate physical activity with rest periods  Outcome:  Progressing    Patient O2 saturation     Patient is not progressing towards the following goals:

## 2025-03-07 NOTE — PROGRESS NOTES
Telemetry Shift Summary     Rhythm: SR  Rate: 80-97  Measurements: 0.12/0.10/040  Ectopy (reported by Monitor Tech): N/A     Normal Values  Rhythm: Sinus  HR:   Measurements: 0.12-0.20/0.06-0.10/0.30-0.52

## 2025-03-07 NOTE — PROGRESS NOTES
Telemetry Shift Summary    Rhythm SR  HR Range 69-81  Ectopy rPAC  Measurements 0.14/0.10/0.40        Normal Values  Rhythm SR  HR Range    Measurements 0.12-0.20 / 0.06-0.10  / 0.30-0.52

## 2025-03-07 NOTE — CARE PLAN
The patient is Watcher - Medium risk of patient condition declining or worsening    Shift Goals  Clinical Goals: remain free from falls, manage pain at or above 3/10 with medication per MAR, mobilize as tolerated, monitor for worsening s/s of infection  Patient Goals: rest, manage pain  Family Goals: n/a    Progress made toward(s) clinical / shift goals: pt remained free from falls, no complaints of pain throughout my shift, pt mobilized as tolerated, at the end of my shift pt started having a fever, resp: in the high 30's, and tachycardic in the 120's. Rapid called. CXR done, Labs drawn.     Problem: Knowledge Deficit - Standard  Goal: Patient and family/care givers will demonstrate understanding of plan of care, disease process/condition, diagnostic tests and medications  Outcome: Progressing     Problem: Fall Risk  Goal: Patient will remain free from falls  Outcome: Progressing     Problem: Urinary - Renal Perfusion  Goal: Ability to achieve and maintain adequate renal perfusion and functioning will improve  Outcome: Progressing     Problem: Physical Regulation  Goal: Diagnostic test results will improve  Outcome: Progressing  Goal: Signs and symptoms of infection will decrease  Outcome: Not Progressing       Patient is not progressing towards the following goals:      Problem: Physical Regulation  Goal: Signs and symptoms of infection will decrease  Outcome: Not Progressing

## 2025-03-07 NOTE — CARE PLAN
The patient is Stable - Low risk of patient condition declining or worsening    Shift Goals  Clinical Goals: monitor temperature, fsbg, monitor labs  Patient Goals: sleep  Family Goals: n/a    Progress made toward(s) clinical / shift goals:        Problem: Knowledge Deficit - Standard  Goal: Patient and family/care givers will demonstrate understanding of plan of care, disease process/condition, diagnostic tests and medications  Outcome: Progressing     Problem: Fall Risk  Goal: Patient will remain free from falls  Outcome: Progressing     Problem: Neuro Status  Goal: Neuro status will remain stable or improve  Outcome: Progressing     Problem: Respiratory  Goal: Patient will achieve/maintain optimum respiratory ventilation and gas exchange  Outcome: Progressing     Problem: Hemodynamics  Goal: Patient's hemodynamics, fluid balance and neurologic status will be stable or improve  Outcome: Progressing     Problem: Fluid Volume  Goal: Fluid volume balance will be maintained  Outcome: Progressing       Patient is not progressing towards the following goals:
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
## 2025-03-07 NOTE — PROGRESS NOTES
Lab called with critical ANC 0.23 and stated other cell could not be identified and slide to be sent to pathology. Notified MD.
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
## 2025-03-08 VITALS
TEMPERATURE: 98.1 F | WEIGHT: 185.63 LBS | BODY MASS INDEX: 27.49 KG/M2 | DIASTOLIC BLOOD PRESSURE: 70 MMHG | HEART RATE: 75 BPM | HEIGHT: 69 IN | SYSTOLIC BLOOD PRESSURE: 115 MMHG | OXYGEN SATURATION: 96 % | RESPIRATION RATE: 18 BRPM

## 2025-03-08 LAB
ANION GAP SERPL CALC-SCNC: 10 MMOL/L (ref 7–16)
ANISOCYTOSIS BLD QL SMEAR: ABNORMAL
BASOPHILS # BLD AUTO: 1.7 % (ref 0–1.8)
BASOPHILS # BLD: 0.05 K/UL (ref 0–0.12)
BUN SERPL-MCNC: 13 MG/DL (ref 8–22)
CALCIUM SERPL-MCNC: 8.6 MG/DL (ref 8.4–10.2)
CHLORIDE SERPL-SCNC: 99 MMOL/L (ref 96–112)
CO2 SERPL-SCNC: 22 MMOL/L (ref 20–33)
COMMENT NL1176: NORMAL
CREAT SERPL-MCNC: 0.81 MG/DL (ref 0.5–1.4)
EOSINOPHIL # BLD AUTO: 0 K/UL (ref 0–0.51)
EOSINOPHIL NFR BLD: 0 % (ref 0–6.9)
ERYTHROCYTE [DISTWIDTH] IN BLOOD BY AUTOMATED COUNT: 49.1 FL (ref 35.9–50)
GFR SERPLBLD CREATININE-BSD FMLA CKD-EPI: 103 ML/MIN/1.73 M 2
GLUCOSE BLD STRIP.AUTO-MCNC: 149 MG/DL (ref 65–99)
GLUCOSE SERPL-MCNC: 136 MG/DL (ref 65–99)
HCT VFR BLD AUTO: 32.2 % (ref 42–52)
HGB BLD-MCNC: 11 G/DL (ref 14–18)
LG PLATELETS BLD QL SMEAR: NORMAL
LYMPHOCYTES # BLD AUTO: 2.5 K/UL (ref 1–4.8)
LYMPHOCYTES NFR BLD: 86.2 % (ref 22–41)
MANUAL DIFF BLD: NORMAL
MCH RBC QN AUTO: 28.6 PG (ref 27–33)
MCHC RBC AUTO-ENTMCNC: 34.2 G/DL (ref 32.3–36.5)
MCV RBC AUTO: 83.9 FL (ref 81.4–97.8)
MICROCYTES BLD QL SMEAR: ABNORMAL
MONOCYTES # BLD AUTO: 0.1 K/UL (ref 0–0.85)
MONOCYTES NFR BLD AUTO: 3.4 % (ref 0–13.4)
MYELOCYTES NFR BLD MANUAL: 0.9 %
NEUTROPHILS # BLD AUTO: 0.2 K/UL (ref 1.82–7.42)
NEUTROPHILS NFR BLD: 6.9 % (ref 44–72)
NRBC # BLD AUTO: 0.04 K/UL
NRBC BLD-RTO: 1.4 /100 WBC (ref 0–0.2)
PLATELET # BLD AUTO: 225 K/UL (ref 164–446)
PLATELET BLD QL SMEAR: NORMAL
PMV BLD AUTO: 10.7 FL (ref 9–12.9)
POLYCHROMASIA BLD QL SMEAR: NORMAL
POTASSIUM SERPL-SCNC: 4.1 MMOL/L (ref 3.6–5.5)
PROMYELOCYTES NFR BLD MANUAL: 0.9 %
RBC # BLD AUTO: 3.84 M/UL (ref 4.7–6.1)
RBC BLD AUTO: PRESENT
SMUDGE CELLS BLD QL SMEAR: NORMAL
SODIUM SERPL-SCNC: 131 MMOL/L (ref 135–145)
VARIANT LYMPHS BLD QL SMEAR: NORMAL
WBC # BLD AUTO: 2.9 K/UL (ref 4.8–10.8)

## 2025-03-08 PROCEDURE — A9270 NON-COVERED ITEM OR SERVICE: HCPCS | Performed by: INTERNAL MEDICINE

## 2025-03-08 PROCEDURE — 36415 COLL VENOUS BLD VENIPUNCTURE: CPT

## 2025-03-08 PROCEDURE — 99239 HOSP IP/OBS DSCHRG MGMT >30: CPT | Performed by: INTERNAL MEDICINE

## 2025-03-08 PROCEDURE — 700102 HCHG RX REV CODE 250 W/ 637 OVERRIDE(OP): Performed by: INTERNAL MEDICINE

## 2025-03-08 PROCEDURE — 85027 COMPLETE CBC AUTOMATED: CPT

## 2025-03-08 PROCEDURE — 82962 GLUCOSE BLOOD TEST: CPT

## 2025-03-08 PROCEDURE — 80048 BASIC METABOLIC PNL TOTAL CA: CPT

## 2025-03-08 PROCEDURE — 85007 BL SMEAR W/DIFF WBC COUNT: CPT

## 2025-03-08 RX ORDER — LEVOFLOXACIN 500 MG/1
500 TABLET, FILM COATED ORAL DAILY
Qty: 10 TABLET | Refills: 0 | Status: ACTIVE | OUTPATIENT
Start: 2025-03-08 | End: 2025-03-18

## 2025-03-08 RX ADMIN — CARVEDILOL 12.5 MG: 6.25 TABLET, FILM COATED ORAL at 07:38

## 2025-03-08 RX ADMIN — SITAGLIPTIN 100 MG: 50 TABLET, FILM COATED ORAL at 06:12

## 2025-03-08 RX ADMIN — ASPIRIN 81 MG: 81 TABLET, COATED ORAL at 06:12

## 2025-03-08 RX ADMIN — FENOFIBRATE 134 MG: 134 CAPSULE ORAL at 06:12

## 2025-03-08 RX ADMIN — PREGABALIN 75 MG: 75 CAPSULE ORAL at 06:12

## 2025-03-08 ASSESSMENT — PAIN DESCRIPTION - PAIN TYPE: TYPE: ACUTE PAIN

## 2025-03-08 NOTE — PROGRESS NOTES
Assumed care of patient at bedside report from NOC RN. Updated on POC. Patient currently A & O x 4; on room air  O2 95 percent saturation; up self; without complaints of acute pain. Assessment completed. Call light within reach. Whiteboard updated. Fall precautions in place. Bed locked and in lowest position. All questions answered. No other needs indicated at this time.

## 2025-03-08 NOTE — CARE PLAN
The patient is Stable - Low risk of patient condition declining or worsening    Shift Goals  Clinical Goals: Monitor vitals and labs  Patient Goals: rest  Family Goals: n/a    Progress made toward(s) clinical / shift goals:      Problem: Knowledge Deficit - Standard  Goal: Patient and family/care givers will demonstrate understanding of plan of care, disease process/condition, diagnostic tests and medications  Outcome: Progressing  Note: Patient verbalizes understanding of plan of care.      Problem: Respiratory  Goal: Patient will achieve/maintain optimum respiratory ventilation and gas exchange  Outcome: Progressing  Note: 1. Assess and monitor rate, rhythm, depth and effort of respiration 2. Breath sounds assessed qshift and/or as needed 3. Assess O2 saturation, administer/titrate oxygen as ordered 4. Position patient for maximum ventilatory efficiency 5. Turn, cough, and deep breath with splinting to improve effectiveness 6. Collaborate with RT to administer medication/treatments per order 7. Encourage use of incentive spirometer and encourage patient to cough after use and utilize splinting techniques if applicable 8. Airway suctioning 9. Monitor sputum production for changes in color, consistency and frequency 10. Perform frequent oral hygiene 11. Alternate physical activity with rest periods      Problem: Fall Risk  Goal: Patient will remain free from falls  Outcome: Progressing       Patient is not progressing towards the following goals:

## 2025-03-08 NOTE — PROGRESS NOTES
Telemetry Shift Summary     Rhythm: SR-ST  HR:   Ectopy: none    Measurements: 0.12/0.08/0.42    Normal Values  Rhythm: SR  HR:   Measurements: 0.12-0.20/0.08-0.10/0.30-0.52
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
## 2025-03-08 NOTE — PROGRESS NOTES
Telemetry shift summary    Rhythm: SR  HR: 77-89  Ectopy: N/A    Measurements: 0.14 / 0.10 / 0.38    Normal values  Rhythm SR  HR   Measurements: 0.12-0.20/0.08-0.10/0.30-0.52

## 2025-03-08 NOTE — PROGRESS NOTES
Lab called to inform this RN of ANC of 0.20 after albumin slide. This RN informed MD of clarification. No new orders at this time.

## 2025-03-08 NOTE — PROGRESS NOTES
Called lab per patient request for lab eta of pending lab results. Lab informed this RN of WBC 2.9 and ANC 0.24. Informed MD of WBC 2.9 and ANC 0.24.

## 2025-03-10 NOTE — Clinical Note
REFERRAL APPROVAL NOTICE         Sent on March 10, 2025                   Jace Mohr  [address]  [facility] [address]                   Dear Mr. Mohr,    After a careful review of the medical information and benefit coverage, [facility] has processed your referral. See below for additional details.    If applicable, you must be actively enrolled with your insurance for coverage of the authorized service. If you have any questions regarding your coverage, please contact your insurance directly.    REFERRAL INFORMATION   Referral #:  [id number]  Referred-To Provider    Referred-By Provider:  Gastroenterology    Javid Hoskins M.D.   [facility]      [address] [facility] [address]  [phone] [address]  [facility] [address]  [phone]    Referral Start Date:  03/03/2025  Referral End Date:   03/03/2026             SCHEDULING  If you do not already have an appointment, please call [phone] to make an appointment.     MORE INFORMATION  If you do not already have a [employer] account, sign up at: [email or web address]  You can access your medical information, make appointments, see lab results, billing information, and more.  If you have questions regarding this referral, please contact  the [facility] Referrals department at:             [phone]. Monday - Friday 8:00AM - 5:00PM.     Sincerely,    [facility]

## 2025-03-11 ENCOUNTER — HOSPITAL ENCOUNTER (OUTPATIENT)
Facility: MEDICAL CENTER | Age: 55
End: 2025-03-11
Attending: INTERNAL MEDICINE
Payer: COMMERCIAL

## 2025-03-11 DIAGNOSIS — R50.81 NEUTROPENIC FEVER (HCC): ICD-10-CM

## 2025-03-11 DIAGNOSIS — D70.9 NEUTROPENIC FEVER (HCC): ICD-10-CM

## 2025-03-11 LAB
ANISOCYTOSIS BLD QL SMEAR: ABNORMAL
BASOPHILS # BLD AUTO: 1.7 % (ref 0–1.8)
BASOPHILS # BLD: 0.06 K/UL (ref 0–0.12)
EOSINOPHIL # BLD AUTO: 0 K/UL (ref 0–0.51)
EOSINOPHIL NFR BLD: 0 % (ref 0–6.9)
ERYTHROCYTE [DISTWIDTH] IN BLOOD BY AUTOMATED COUNT: 52 FL (ref 35.9–50)
HCT VFR BLD AUTO: 34.6 % (ref 42–52)
HGB BLD-MCNC: 11.2 G/DL (ref 14–18)
LYMPHOCYTES # BLD AUTO: 2.83 K/UL (ref 1–4.8)
LYMPHOCYTES NFR BLD: 85.9 % (ref 22–41)
MACROCYTES BLD QL SMEAR: ABNORMAL
MANUAL DIFF BLD: NORMAL
MCH RBC QN AUTO: 28.6 PG (ref 27–33)
MCHC RBC AUTO-ENTMCNC: 32.4 G/DL (ref 32.3–36.5)
MCV RBC AUTO: 88.5 FL (ref 81.4–97.8)
MICROCYTES BLD QL SMEAR: ABNORMAL
MONOCYTES # BLD AUTO: 0.22 K/UL (ref 0–0.85)
MONOCYTES NFR BLD AUTO: 6.6 % (ref 0–13.4)
NEUTROPHILS # BLD AUTO: 0.19 K/UL (ref 1.82–7.42)
NEUTROPHILS NFR BLD: 5.8 % (ref 44–72)
NRBC # BLD AUTO: 0.02 K/UL
NRBC BLD-RTO: 0.6 /100 WBC (ref 0–0.2)
PLATELET # BLD AUTO: 200 K/UL (ref 164–446)
PLATELET BLD QL SMEAR: NORMAL
PMV BLD AUTO: 10.2 FL (ref 9–12.9)
POLYCHROMASIA BLD QL SMEAR: NORMAL
RBC # BLD AUTO: 3.91 M/UL (ref 4.7–6.1)
RBC BLD AUTO: PRESENT
SMUDGE CELLS BLD QL SMEAR: NORMAL
VARIANT LYMPHS BLD QL SMEAR: NORMAL
WBC # BLD AUTO: 3.3 K/UL (ref 4.8–10.8)

## 2025-03-11 PROCEDURE — 36415 COLL VENOUS BLD VENIPUNCTURE: CPT

## 2025-03-11 PROCEDURE — 85007 BL SMEAR W/DIFF WBC COUNT: CPT

## 2025-03-11 PROCEDURE — 85027 COMPLETE CBC AUTOMATED: CPT

## 2025-03-13 ENCOUNTER — OFFICE VISIT (OUTPATIENT)
Dept: MEDICAL GROUP | Facility: MEDICAL CENTER | Age: 55
End: 2025-03-13
Payer: COMMERCIAL

## 2025-03-13 VITALS
HEIGHT: 69 IN | SYSTOLIC BLOOD PRESSURE: 112 MMHG | TEMPERATURE: 97.5 F | BODY MASS INDEX: 27.25 KG/M2 | OXYGEN SATURATION: 99 % | WEIGHT: 184 LBS | DIASTOLIC BLOOD PRESSURE: 58 MMHG | HEART RATE: 80 BPM

## 2025-03-13 DIAGNOSIS — J18.9 COMMUNITY ACQUIRED PNEUMONIA OF RIGHT MIDDLE LOBE OF LUNG: ICD-10-CM

## 2025-03-13 DIAGNOSIS — R50.81 NEUTROPENIC FEVER (HCC): ICD-10-CM

## 2025-03-13 DIAGNOSIS — E78.5 DYSLIPIDEMIA: ICD-10-CM

## 2025-03-13 DIAGNOSIS — D70.9 NEUTROPENIA, UNSPECIFIED TYPE (HCC): ICD-10-CM

## 2025-03-13 DIAGNOSIS — E11.40 TYPE 2 DIABETES MELLITUS WITH DIABETIC NEUROPATHY, WITHOUT LONG-TERM CURRENT USE OF INSULIN (HCC): ICD-10-CM

## 2025-03-13 DIAGNOSIS — D70.9 NEUTROPENIC FEVER (HCC): ICD-10-CM

## 2025-03-13 PROCEDURE — 3074F SYST BP LT 130 MM HG: CPT | Performed by: STUDENT IN AN ORGANIZED HEALTH CARE EDUCATION/TRAINING PROGRAM

## 2025-03-13 PROCEDURE — 3078F DIAST BP <80 MM HG: CPT | Performed by: STUDENT IN AN ORGANIZED HEALTH CARE EDUCATION/TRAINING PROGRAM

## 2025-03-13 PROCEDURE — 99214 OFFICE O/P EST MOD 30 MIN: CPT | Performed by: STUDENT IN AN ORGANIZED HEALTH CARE EDUCATION/TRAINING PROGRAM

## 2025-03-13 ASSESSMENT — FIBROSIS 4 INDEX: FIB4 SCORE: 2.04

## 2025-03-13 NOTE — PROGRESS NOTES
"Subjective:     CC: Neutropenia, DM    Verbal consent was acquired by the patient to use PlusFourSixot ambient listening note generation during this visit Yes     HPI:   José presents today with    History of Present Illness  The patient presents for evaluation of neutropenia, diabetes mellitus, and health maintenance. Accompanied by their spouse.    Reports general well-being since hospital discharge, with intermittent sleep disturbances. Plans to contact Dr. Knox regarding referral. Spouse notes increased WBC count, but ANC not in chart. Continues to experience weakness and fatigue, requiring PT, OT, and home health services. Dietary intake consistent. Administered Granix during hospital stay. Advised to maintain isolation at home and wear a mask outdoors. Uses a cane for mobility. Spouse inquires about follow-up CT chest in 3 months and colonoscopy as recommended by Dr. Hoskins.    Due for diabetic eye and foot exams.    MEDICATIONS  Granix        ROS:  ROS    Objective:     Exam:  /58 (BP Location: Left arm, Patient Position: Sitting, BP Cuff Size: Adult)   Pulse 80   Temp 36.4 °C (97.5 °F) (Temporal)   Ht 1.75 m (5' 8.9\")   Wt 83.5 kg (184 lb)   SpO2 99%   BMI 27.25 kg/m²  Body mass index is 27.25 kg/m².    Physical Exam  Vitals reviewed.   Constitutional:       General: He is not in acute distress.     Appearance: He is not toxic-appearing.   HENT:      Head: Normocephalic and atraumatic.      Right Ear: External ear normal.      Left Ear: External ear normal.   Eyes:      General:         Right eye: No discharge.         Left eye: No discharge.      Extraocular Movements: Extraocular movements intact.      Conjunctiva/sclera: Conjunctivae normal.   Pulmonary:      Effort: Pulmonary effort is normal. No respiratory distress.      Breath sounds: Normal breath sounds. No wheezing or rales.   Feet:      Right foot:      Protective Sensation: 4 sites tested.   1 site sensed.     Left foot:      " Protective Sensation: 4 sites tested.   1 site sensed.  Skin:     General: Skin is warm and dry.   Neurological:      Mental Status: He is alert.   Psychiatric:         Mood and Affect: Mood normal.         Behavior: Behavior normal.         Thought Content: Thought content normal.         Judgment: Judgment normal.           Assessment & Plan:     54 y.o. male with the following -     Assessment & Plan  1. Neutropenia  - Neutrophil count remains significantly low  - Maintain strict isolation for a week  - Contact Hematology-Oncology for an appointment  - Avoid crowded places and limit contact with others  - Wear a mask and practice hand hygiene during necessary outings  - Referral to Hematology-Oncology for further evaluation, including potential bone marrow biopsy  - Updated referral to [facility] for home lab tests  - Follow-up CT chest ordered  - Colonoscopy scheduled    2. Diabetes Mellitus  - Diabetic labs (A1c, blood counts, cholesterol, urine test) ordered for next week  - Diabetic eye and foot exams today    1. Neutropenia, unspecified type (HCC)  Referral to Home Health    CBC WITH DIFFERENTIAL      2. Type 2 diabetes mellitus with diabetic neuropathy, without long-term current use of insulin (HCC)  HEMOGLOBIN A1C    MICROALBUMIN CREAT RATIO URINE    Diabetic Monofilament LE Exam    POCT Retinal Eye Exam      3. Dyslipidemia  Lipid Profile      4. Neutropenic fever (HCC)  CT-CHEST (THORAX) WITH      5. Community acquired pneumonia of right middle lobe of lung  CT-CHEST (THORAX) WITH            No follow-ups on file.    Please note that this dictation was created using voice recognition software. I have made every reasonable attempt to correct obvious errors, but I expect that there are errors of grammar and possibly content that I did not discover before finalizing the note.

## 2025-03-17 DIAGNOSIS — E78.5 DYSLIPIDEMIA: ICD-10-CM

## 2025-03-17 DIAGNOSIS — I21.11 STEMI INVOLVING RIGHT CORONARY ARTERY (HCC): ICD-10-CM

## 2025-03-17 RX ORDER — LISINOPRIL 5 MG/1
5 TABLET ORAL DAILY
Qty: 90 TABLET | Refills: 3 | Status: SHIPPED | OUTPATIENT
Start: 2025-03-17

## 2025-03-17 NOTE — TELEPHONE ENCOUNTER
Is the patient due for a refill? Yes    Was the patient seen the last 15 months? Yes    Date of last office visit: 1/7/2025    Does the patient have an upcoming appointment?  No    Provider to refill:JI    Does the patient have correction Plus and need 100-day supply? (This applies to ALL medications) Patient does not have SCP

## 2025-03-18 ENCOUNTER — HOSPITAL ENCOUNTER (OUTPATIENT)
Facility: MEDICAL CENTER | Age: 55
End: 2025-03-18
Attending: STUDENT IN AN ORGANIZED HEALTH CARE EDUCATION/TRAINING PROGRAM
Payer: COMMERCIAL

## 2025-03-18 LAB
ANISOCYTOSIS BLD QL SMEAR: ABNORMAL
BASOPHILS # BLD AUTO: 0 % (ref 0–1.8)
BASOPHILS # BLD: 0 K/UL (ref 0–0.12)
CHOLEST SERPL-MCNC: 99 MG/DL (ref 100–199)
EOSINOPHIL # BLD AUTO: 0 K/UL (ref 0–0.51)
EOSINOPHIL NFR BLD: 0 % (ref 0–6.9)
ERYTHROCYTE [DISTWIDTH] IN BLOOD BY AUTOMATED COUNT: 64.3 FL (ref 35.9–50)
EST. AVERAGE GLUCOSE BLD GHB EST-MCNC: 108 MG/DL
HBA1C MFR BLD: 5.4 % (ref 4–5.6)
HCT VFR BLD AUTO: 36 % (ref 42–52)
HDLC SERPL-MCNC: 24 MG/DL
HGB BLD-MCNC: 12 G/DL (ref 14–18)
LDLC SERPL CALC-MCNC: 17 MG/DL
LYMPHOCYTES # BLD AUTO: 1.61 K/UL (ref 1–4.8)
LYMPHOCYTES NFR BLD: 59.5 % (ref 22–41)
MANUAL DIFF BLD: NORMAL
MCH RBC QN AUTO: 29.2 PG (ref 27–33)
MCHC RBC AUTO-ENTMCNC: 33.3 G/DL (ref 32.3–36.5)
MCV RBC AUTO: 87.6 FL (ref 81.4–97.8)
MICROCYTES BLD QL SMEAR: ABNORMAL
MONOCYTES # BLD AUTO: 0.56 K/UL (ref 0–0.85)
MONOCYTES NFR BLD AUTO: 20.7 % (ref 0–13.4)
NEUTROPHILS # BLD AUTO: 0.53 K/UL (ref 1.82–7.42)
NEUTROPHILS NFR BLD: 19.8 % (ref 44–72)
NRBC # BLD AUTO: 0 K/UL
NRBC BLD-RTO: 0 /100 WBC (ref 0–0.2)
PLATELET # BLD AUTO: 186 K/UL (ref 164–446)
PLATELET BLD QL SMEAR: NORMAL
PMV BLD AUTO: 10.4 FL (ref 9–12.9)
RBC # BLD AUTO: 4.11 M/UL (ref 4.7–6.1)
RBC BLD AUTO: PRESENT
TRIGL SERPL-MCNC: 291 MG/DL (ref 0–149)
WBC # BLD AUTO: 2.7 K/UL (ref 4.8–10.8)

## 2025-03-18 PROCEDURE — 82043 UR ALBUMIN QUANTITATIVE: CPT

## 2025-03-18 PROCEDURE — 85027 COMPLETE CBC AUTOMATED: CPT

## 2025-03-18 PROCEDURE — 85007 BL SMEAR W/DIFF WBC COUNT: CPT

## 2025-03-18 PROCEDURE — 83036 HEMOGLOBIN GLYCOSYLATED A1C: CPT

## 2025-03-18 PROCEDURE — 82570 ASSAY OF URINE CREATININE: CPT

## 2025-03-18 PROCEDURE — 80061 LIPID PANEL: CPT

## 2025-03-19 ENCOUNTER — RESULTS FOLLOW-UP (OUTPATIENT)
Dept: MEDICAL GROUP | Facility: MEDICAL CENTER | Age: 55
End: 2025-03-19
Payer: COMMERCIAL

## 2025-03-19 LAB
CREAT UR-MCNC: 82.4 MG/DL
MICROALBUMIN UR-MCNC: <1.2 MG/DL
MICROALBUMIN/CREAT UR: NORMAL MG/G (ref 0–30)

## 2025-03-20 ENCOUNTER — TELEMEDICINE (OUTPATIENT)
Dept: MEDICAL GROUP | Facility: MEDICAL CENTER | Age: 55
End: 2025-03-20
Payer: COMMERCIAL

## 2025-03-20 VITALS — HEIGHT: 68 IN | BODY MASS INDEX: 28.04 KG/M2 | WEIGHT: 185 LBS

## 2025-03-20 DIAGNOSIS — D70.9 NEUTROPENIA, UNSPECIFIED TYPE (HCC): ICD-10-CM

## 2025-03-20 PROCEDURE — 99214 OFFICE O/P EST MOD 30 MIN: CPT | Performed by: STUDENT IN AN ORGANIZED HEALTH CARE EDUCATION/TRAINING PROGRAM

## 2025-03-20 ASSESSMENT — FIBROSIS 4 INDEX: FIB4 SCORE: 2.2
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
## 2025-03-20 NOTE — Clinical Note
REFERRAL APPROVAL NOTICE         Sent on March 20, 2025                   Jace Mohr  [address]  [facility] [address]                   Dear Mr. Mohr,    After a careful review of the medical information and benefit coverage, [facility] has processed your referral. See below for additional details.    If applicable, you must be actively enrolled with your insurance for coverage of the authorized service. If you have any questions regarding your coverage, please contact your insurance directly.    REFERRAL INFORMATION   Referral #:  [id number]  Referred-To Department    Referred-By Provider:  Hematology Oncology    Javid Hoskins M.D.   On[facility]      [address] [facility] [id number] [phone] [address] [facility]  [address]  [facility] [id number] [phone]    Referral Start Date:  03/03/2025  Referral End Date:   03/03/2026           SCHEDULING  If you do not already have an appointment, please call [phone] to make an appointment.   MORE INFORMATION  As a reminder, [facility] ownership has changed, meaning this location is now owned and operated by [facility]. As such, we want to clarify that our patients should expect to receive two separate bills for the services received at [facility] - one representing the [facility] facility fees as the owner of the establishment, and the other to represent the physician's services and subsequent fees. You can speak with your insurance carrier for a pricing estimate by calling the customer service number on the back of your card and ask about charges for a hospital outpatient visit.  If you do not already have a [email or web address] account, sign up at: [email or web address]  You can access your medical information, make appointments, see lab results, billing information, and more.  If you have questions regarding this referral, please contact  the [facility] Referrals department at:             [phone].  Monday - Friday 7:30AM - 5:00PM.      Sincerely,  [facility]

## 2025-03-21 ENCOUNTER — HOSPITAL ENCOUNTER (OUTPATIENT)
Dept: HEMATOLOGY ONCOLOGY | Facility: MEDICAL CENTER | Age: 55
End: 2025-03-21
Attending: STUDENT IN AN ORGANIZED HEALTH CARE EDUCATION/TRAINING PROGRAM
Payer: COMMERCIAL

## 2025-03-21 ENCOUNTER — HOSPITAL ENCOUNTER (OUTPATIENT)
Facility: MEDICAL CENTER | Age: 55
End: 2025-03-21
Attending: STUDENT IN AN ORGANIZED HEALTH CARE EDUCATION/TRAINING PROGRAM
Payer: COMMERCIAL

## 2025-03-21 ENCOUNTER — TELEPHONE (OUTPATIENT)
Dept: HEMATOLOGY ONCOLOGY | Facility: MEDICAL CENTER | Age: 55
End: 2025-03-21

## 2025-03-21 VITALS
WEIGHT: 189.82 LBS | HEART RATE: 79 BPM | BODY MASS INDEX: 28.77 KG/M2 | TEMPERATURE: 97.6 F | DIASTOLIC BLOOD PRESSURE: 70 MMHG | OXYGEN SATURATION: 99 % | HEIGHT: 68 IN | SYSTOLIC BLOOD PRESSURE: 138 MMHG

## 2025-03-21 DIAGNOSIS — R16.1 SPLENOMEGALY: ICD-10-CM

## 2025-03-21 DIAGNOSIS — D70.9 NEUTROPENIA, UNSPECIFIED TYPE (HCC): ICD-10-CM

## 2025-03-21 DIAGNOSIS — Z85.828 HISTORY OF BASAL CELL CARCINOMA: Chronic | ICD-10-CM

## 2025-03-21 LAB
ANISOCYTOSIS BLD QL SMEAR: ABNORMAL
BASOPHILS # BLD AUTO: 1.7 % (ref 0–1.8)
BASOPHILS # BLD: 0.05 K/UL (ref 0–0.12)
EOSINOPHIL # BLD AUTO: 0 K/UL (ref 0–0.51)
EOSINOPHIL NFR BLD: 0 % (ref 0–6.9)
ERYTHROCYTE [DISTWIDTH] IN BLOOD BY AUTOMATED COUNT: 66.2 FL (ref 35.9–50)
FERRITIN SERPL-MCNC: 1217 NG/ML (ref 22–322)
HCT VFR BLD AUTO: 36.1 % (ref 42–52)
HGB BLD-MCNC: 11.5 G/DL (ref 14–18)
IRON SATN MFR SERPL: 26 % (ref 15–55)
IRON SERPL-MCNC: 94 UG/DL (ref 50–180)
LDH SERPL L TO P-CCNC: 260 U/L (ref 107–266)
LG PLATELETS BLD QL SMEAR: NORMAL
LYMPHOCYTES # BLD AUTO: 1.91 K/UL (ref 1–4.8)
LYMPHOCYTES NFR BLD: 68.3 % (ref 22–41)
MANUAL DIFF BLD: NORMAL
MCH RBC QN AUTO: 28.6 PG (ref 27–33)
MCHC RBC AUTO-ENTMCNC: 31.9 G/DL (ref 32.3–36.5)
MCV RBC AUTO: 89.8 FL (ref 81.4–97.8)
MICROCYTES BLD QL SMEAR: ABNORMAL
MONOCYTES # BLD AUTO: 0.33 K/UL (ref 0–0.85)
MONOCYTES NFR BLD AUTO: 11.7 % (ref 0–13.4)
MYELOCYTES NFR BLD MANUAL: 0.8 %
NEUTROPHILS # BLD AUTO: 0.49 K/UL (ref 1.82–7.42)
NEUTROPHILS NFR BLD: 16.7 % (ref 44–72)
NEUTS BAND NFR BLD MANUAL: 0.8 % (ref 0–10)
NRBC # BLD AUTO: 0 K/UL
NRBC BLD-RTO: 0 /100 WBC (ref 0–0.2)
OVALOCYTES BLD QL SMEAR: NORMAL
PLATELET # BLD AUTO: 175 K/UL (ref 164–446)
PLATELET BLD QL SMEAR: NORMAL
PMV BLD AUTO: 10.3 FL (ref 9–12.9)
POIKILOCYTOSIS BLD QL SMEAR: NORMAL
POLYCHROMASIA BLD QL SMEAR: NORMAL
RBC # BLD AUTO: 4.02 M/UL (ref 4.7–6.1)
RBC BLD AUTO: PRESENT
TIBC SERPL-MCNC: 358 UG/DL (ref 250–450)
UIBC SERPL-MCNC: 264 UG/DL (ref 110–370)
URATE SERPL-MCNC: 5.1 MG/DL (ref 2.5–8.3)
VARIANT LYMPHS BLD QL SMEAR: NORMAL
WBC # BLD AUTO: 2.8 K/UL (ref 4.8–10.8)

## 2025-03-21 PROCEDURE — 36415 COLL VENOUS BLD VENIPUNCTURE: CPT

## 2025-03-21 PROCEDURE — 83615 LACTATE (LD) (LDH) ENZYME: CPT

## 2025-03-21 PROCEDURE — 85027 COMPLETE CBC AUTOMATED: CPT

## 2025-03-21 PROCEDURE — 83550 IRON BINDING TEST: CPT

## 2025-03-21 PROCEDURE — 84550 ASSAY OF BLOOD/URIC ACID: CPT

## 2025-03-21 PROCEDURE — 99204 OFFICE O/P NEW MOD 45 MIN: CPT | Performed by: STUDENT IN AN ORGANIZED HEALTH CARE EDUCATION/TRAINING PROGRAM

## 2025-03-21 PROCEDURE — 88184 FLOWCYTOMETRY/ TC 1 MARKER: CPT

## 2025-03-21 PROCEDURE — 83540 ASSAY OF IRON: CPT

## 2025-03-21 PROCEDURE — 82728 ASSAY OF FERRITIN: CPT

## 2025-03-21 PROCEDURE — 88185 FLOWCYTOMETRY/TC ADD-ON: CPT | Mod: 91

## 2025-03-21 PROCEDURE — 85007 BL SMEAR W/DIFF WBC COUNT: CPT

## 2025-03-21 PROCEDURE — 99212 OFFICE O/P EST SF 10 MIN: CPT | Performed by: STUDENT IN AN ORGANIZED HEALTH CARE EDUCATION/TRAINING PROGRAM

## 2025-03-21 ASSESSMENT — FIBROSIS 4 INDEX: FIB4 SCORE: 2.2

## 2025-03-21 NOTE — TELEPHONE ENCOUNTER
Antwon from [facility] lab called with critical ANC value at 0.49. Result read back to Antwon. Dr. Dumont notified of critical lab.Anticipated lab value per Dr. Dumont.  No new orders received.
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
## 2025-03-23 LAB
ACUTE LEUKEMIA MARKERS SPEC-IMP: NORMAL
EVENTS COUNTED SPEC: 32 MARKERS
SOURCE 9121: NORMAL

## 2025-03-24 DIAGNOSIS — C96.9 T-CELL OR NK-CELL NEOPLASM (HCC): ICD-10-CM

## 2025-03-25 ENCOUNTER — HOSPITAL ENCOUNTER (OUTPATIENT)
Facility: MEDICAL CENTER | Age: 55
End: 2025-03-25
Attending: STUDENT IN AN ORGANIZED HEALTH CARE EDUCATION/TRAINING PROGRAM | Admitting: STUDENT IN AN ORGANIZED HEALTH CARE EDUCATION/TRAINING PROGRAM
Payer: COMMERCIAL

## 2025-03-25 ENCOUNTER — APPOINTMENT (OUTPATIENT)
Dept: ADMISSIONS | Facility: MEDICAL CENTER | Age: 55
End: 2025-03-25
Attending: STUDENT IN AN ORGANIZED HEALTH CARE EDUCATION/TRAINING PROGRAM
Payer: COMMERCIAL

## 2025-03-25 DIAGNOSIS — D70.9 NEUTROPENIA, UNSPECIFIED TYPE (HCC): ICD-10-CM

## 2025-03-25 DIAGNOSIS — C96.9 T-CELL OR NK-CELL NEOPLASM (HCC): ICD-10-CM

## 2025-03-25 NOTE — PROGRESS NOTES
Discussed results of flow cytometry - with concern for T cell neoplastic process.  Will plan for BMBx as previously discussed.  Ordered PET CT and port placement.  Also ordered peripheral t cell gene rearrangement.

## 2025-03-26 ENCOUNTER — HOSPITAL ENCOUNTER (OUTPATIENT)
Dept: HEMATOLOGY ONCOLOGY | Facility: MEDICAL CENTER | Age: 55
End: 2025-03-26
Attending: NURSE PRACTITIONER
Payer: COMMERCIAL

## 2025-03-26 ENCOUNTER — HOSPITAL ENCOUNTER (OUTPATIENT)
Facility: MEDICAL CENTER | Age: 55
End: 2025-03-26
Attending: NURSE PRACTITIONER
Payer: COMMERCIAL

## 2025-03-26 VITALS
SYSTOLIC BLOOD PRESSURE: 133 MMHG | RESPIRATION RATE: 16 BRPM | HEART RATE: 85 BPM | OXYGEN SATURATION: 95 % | DIASTOLIC BLOOD PRESSURE: 80 MMHG | TEMPERATURE: 98.6 F

## 2025-03-26 VITALS — DIASTOLIC BLOOD PRESSURE: 89 MMHG | HEART RATE: 73 BPM | SYSTOLIC BLOOD PRESSURE: 133 MMHG | RESPIRATION RATE: 16 BRPM

## 2025-03-26 DIAGNOSIS — D70.9 NEUTROPENIA, UNSPECIFIED TYPE (HCC): ICD-10-CM

## 2025-03-26 DIAGNOSIS — D61.818 PANCYTOPENIA (HCC): ICD-10-CM

## 2025-03-26 DIAGNOSIS — R16.1 SPLENOMEGALY: ICD-10-CM

## 2025-03-26 LAB
ANISOCYTOSIS BLD QL SMEAR: ABNORMAL
BASOPHILS # BLD AUTO: 0 % (ref 0–1.8)
BASOPHILS # BLD: 0 K/UL (ref 0–0.12)
COMMENT NL1176: NORMAL
EOSINOPHIL # BLD AUTO: 0.02 K/UL (ref 0–0.51)
EOSINOPHIL NFR BLD: 0.9 % (ref 0–6.9)
ERYTHROCYTE [DISTWIDTH] IN BLOOD BY AUTOMATED COUNT: 62.9 FL (ref 35.9–50)
HCT VFR BLD AUTO: 35.4 % (ref 42–52)
HGB BLD-MCNC: 11.8 G/DL (ref 14–18)
LYMPHOCYTES # BLD AUTO: 2.11 K/UL (ref 1–4.8)
LYMPHOCYTES NFR BLD: 87.9 % (ref 22–41)
MANUAL DIFF BLD: NORMAL
MCH RBC QN AUTO: 29.1 PG (ref 27–33)
MCHC RBC AUTO-ENTMCNC: 33.3 G/DL (ref 32.3–36.5)
MCV RBC AUTO: 87.4 FL (ref 81.4–97.8)
MICROCYTES BLD QL SMEAR: ABNORMAL
MONOCYTES # BLD AUTO: 0.07 K/UL (ref 0–0.85)
MONOCYTES NFR BLD AUTO: 2.8 % (ref 0–13.4)
MORPHOLOGY BLD-IMP: NORMAL
NEUTROPHILS # BLD AUTO: 0.2 K/UL (ref 1.82–7.42)
NEUTROPHILS NFR BLD: 8.4 % (ref 44–72)
NRBC # BLD AUTO: 0.02 K/UL
NRBC BLD-RTO: 0.8 /100 WBC (ref 0–0.2)
PATHOLOGY CONSULT NOTE: NORMAL
PLATELET # BLD AUTO: 176 K/UL (ref 164–446)
PLATELET BLD QL SMEAR: NORMAL
PMV BLD AUTO: 9.8 FL (ref 9–12.9)
RBC # BLD AUTO: 4.05 M/UL (ref 4.7–6.1)
RBC BLD AUTO: PRESENT
SMUDGE CELLS BLD QL SMEAR: NORMAL
STOMATOCYTES BLD QL SMEAR: NORMAL
VARIANT LYMPHS BLD QL SMEAR: NORMAL
WBC # BLD AUTO: 2.4 K/UL (ref 4.8–10.8)

## 2025-03-26 PROCEDURE — 85007 BL SMEAR W/DIFF WBC COUNT: CPT

## 2025-03-26 PROCEDURE — 85027 COMPLETE CBC AUTOMATED: CPT

## 2025-03-26 PROCEDURE — 99999 PR NO CHARGE: CPT | Performed by: NURSE PRACTITIONER

## 2025-03-26 PROCEDURE — 38222 DX BONE MARROW BX & ASPIR: CPT | Performed by: NURSE PRACTITIONER

## 2025-03-26 PROCEDURE — 38222 DX BONE MARROW BX & ASPIR: CPT

## 2025-03-26 NOTE — PROGRESS NOTES
Chief Complaint   Patient presents with    Bone Marrow     BMBx       José Mohr presents for bone marrow biopsy to r/o T cell or other lymphoproliferative disorder    Records reviewed prior to procedure.      Procedure requested by: Dr. Kendal Dumont  Procedure completed by BRANT Larose.      See procedure note for details      No visits with results within 1 Day(s) from this visit.   Latest known visit with results is:   Hospital Outpatient Visit on 03/21/2025   Component Date Value Ref Range Status    WBC 03/21/2025 2.8 (L)  4.8 - 10.8 K/uL Final    RBC 03/21/2025 4.02 (L)  4.70 - 6.10 M/uL Final    Hemoglobin 03/21/2025 11.5 (L)  14.0 - 18.0 g/dL Final    Hematocrit 03/21/2025 36.1 (L)  42.0 - 52.0 % Final    MCV 03/21/2025 89.8  81.4 - 97.8 fL Final    MCH 03/21/2025 28.6  27.0 - 33.0 pg Final    MCHC 03/21/2025 31.9 (L)  32.3 - 36.5 g/dL Final    RDW 03/21/2025 66.2 (H)  35.9 - 50.0 fL Final    Platelet Count 03/21/2025 175  164 - 446 K/uL Final    MPV 03/21/2025 10.3  9.0 - 12.9 fL Final    Neutrophils-Polys 03/21/2025 16.70 (L)  44.00 - 72.00 % Final    Lymphocytes 03/21/2025 68.30 (H)  22.00 - 41.00 % Final    Monocytes 03/21/2025 11.70  0.00 - 13.40 % Final    Eosinophils 03/21/2025 0.00  0.00 - 6.90 % Final    Basophils 03/21/2025 1.70  0.00 - 1.80 % Final    Nucleated RBC 03/21/2025 0.00  0.00 - 0.20 /100 WBC Final    Neutrophils (Absolute) 03/21/2025 0.49 (LL)  1.82 - 7.42 K/uL Corrected    Comment: Corrected result; previously reported as 0.47 on 03/21/2025 at 15:37  Includes immature neutrophils, if present.      Lymphs (Absolute) 03/21/2025 1.91  1.00 - 4.80 K/uL Final    Monos (Absolute) 03/21/2025 0.33  0.00 - 0.85 K/uL Final    Eos (Absolute) 03/21/2025 0.00  0.00 - 0.51 K/uL Final    Baso (Absolute) 03/21/2025 0.05  0.00 - 0.12 K/uL Final    NRBC (Absolute) 03/21/2025 0.00  K/uL Final    Anisocytosis 03/21/2025 1+   Final    Microcytosis 03/21/2025 1+   Final    Source:  03/21/2025 Blood   Final    Number of Markers 03/21/2025 32  markers Final    Impression, Leuk/Lymph Phenotype 03/21/2025 See Note   Final    Comment:     SAMPLE: Peripheral Blood    IMPRESSION:  1. 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    COMMENT:  1. The restricted expression of TRBC (uniformly negative) is  consistent with a clonal process. Together with phenotypic  abnormalities these findings raise the possibility of a T cell  lymphoproliferative disorder; however, morphologic and clinical  correlation will be required to make the final diagnosis.  Additionally, TCR gene rearrangement studies ([facility] test code  [id number]) may be considered.    POPULATION PHENOTYPE:  1. Positive: low CD2, CD3, variable low to minor subset absent  CD7, slightly decreased CD8, partial CD16, partial low CD25,  variable CD57, low (PD-1)  Negative: anastacia-delt, CD4, absent CD5 (minor subset low+), CD10,  CD26, CD30, CD56, (minor subset low positive) , restricted  TRBC1  Aberrancy: low CD2, absent CD5, variable low to absent CD7, low  CD                           279(PD-1), restricted TRBC1 (uniformly negative)    ANALYSIS  Differential:  Lymphocytes: 81%  Monocytes: 9.8%  Granulocytes: 9.0% (mild left shift)    Blasts: 0.070%    Lymphocytes:  B-cells: 11%  T-cells: 87%  NK-cells: 1.7%    Kappa:Lambda Ratio: approx. 1:1  CD4:CD8 Ratio: approx. 1:3    Viability: 87%    Markers run: anastacia-delt, HLA-DR, Kappa, Lambda, CD2, CD3, CD4, CD5,  CD7, CD8, CD10, CD11b, CD13, CD14, CD16, CD19, CD20, CD23, CD25,  CD26, CD30, CD33, CD34, CD38, CD45, CD56, CD57, CD64, ,  , (PD-1), TRBC1  Num of Markers Run: 32    This result has been reviewed and approved by Klaudia Matt M.D. 03/23/2025  INTERPRETIVE INFORMATION: Leuk/Lymph Phenotyping, Flow Cytometry  This test was developed and its performance  characteristics  determined by [facility]. It has not been cleared or  approved by the [place] Food and Drug Administration. This test was  performed in a CLIA certified laboratory and is intended for  clinical purposes.  Performed By: MARJ Ansari  [address] [place] [address]  : Juan Miguel Vallejo MD, PhD  CLIA Number: [id number]      Iron 03/21/2025 94  50 - 180 ug/dL Final    Total Iron Binding 03/21/2025 358  250 - 450 ug/dL Final    Unsat Iron Binding 03/21/2025 264  110 - 370 ug/dL Final    % Saturation 03/21/2025 26  15 - 55 % Final    Ferritin 03/21/2025 1217.0 (H)  22.0 - 322.0 ng/mL Final    LDH Total 03/21/2025 260  107 - 266 U/L Final    Uric Acid 03/21/2025 5.1  2.5 - 8.3 mg/dL Final    Bands-Stabs 03/21/2025 0.80  0.00 - 10.00 % Final    Myelocytes 03/21/2025 0.80  % Final    Manual Diff Status 03/21/2025 PERFORMED   Final    Plt Estimation 03/21/2025 Normal   Final    Comment: Platelet clumping confirmed on smear review.  If a more accurate platelet  count is required, please request recollection.      RBC Morphology 03/21/2025 Present   Final    Large Platelets 03/21/2025 1+   Final    Polychromia 03/21/2025 1+   Final    Poikilocytosis 03/21/2025 1+   Final    Ovalocytes 03/21/2025 1+   Final    Reactive Lymphocytes 03/21/2025 Few   Final         1. Pancytopenia (HCC)  Bone Marrow Biopsy/Aspiration    Bone Marrow Biopsy/Aspiration      2. Splenomegaly  Bone Marrow Biopsy/Aspiration    Bone Marrow Biopsy/Aspiration

## 2025-03-26 NOTE — PROGRESS NOTES
Pt arrived for Bone Marrow biopsy.  CBC drawn per protocol.  Procedure explained and Consent form signed.  Vitals taken.  DOMI Ventura performed biopsy of the posterior iliac crest after Time-Out called.  Pt tolerated the procedure well with minimal bleeding. Bandage applied.  Vitals taken.  Pt left in no apparent distress.

## 2025-03-26 NOTE — PROCEDURES
Bone Marrow Biopsy/Aspiration    Date/Time: 3/26/2025 9:41 AM    Performed by: ESTRADA Larose  Authorized by: ESTRADA Larose    Consent:     Consent obtained:  Written    Consent given by:  Patient    Risks discussed:  Bleeding, infection and pain  Universal protocol:     Procedure explained and questions answered to patient or proxy's satisfaction: yes      Relevant documents present and verified: yes      Test results available and properly labeled: yes      Imaging studies available: n/a.      Immediately prior to procedure a time out was called: yes      Site/side marked: yes      Patient identity confirmed:  Verbally with patient and hospital-assigned identification number  Pre-procedure details:     Procedure type:  Aspiration and biopsy    Requesting physician:  Dr. Kendal Dumont    Indications:  R/o T cell or other lymphoproliferative disorder    Position:  Left lateral decubitus    Buttock laterality:  Right    Local anesthetic:  1% Lidocaine    Subcutaneous volume:  1 mL    Periosteum anesthetic volume:  5 mL  Sedation:     Patient Sedated: No    Procedure details:     Aspirate obtained:  5 mL followed by 5 mL    Blood cultures collected:  None    Biopsy performed:  2 cores    Number of attempts:  2    Estimated blood loss (mL):  1  Post-procedure:     Puncture site:  Adhesive bandage applied and direct pressure applied    Patient tolerance of procedure:  Tolerated well, no immediate complications

## 2025-03-27 ENCOUNTER — APPOINTMENT (OUTPATIENT)
Dept: MEDICAL GROUP | Facility: MEDICAL CENTER | Age: 55
End: 2025-03-27
Payer: COMMERCIAL

## 2025-03-27 ENCOUNTER — HOSPITAL ENCOUNTER (OUTPATIENT)
Dept: RADIOLOGY | Facility: MEDICAL CENTER | Age: 55
End: 2025-03-27
Attending: STUDENT IN AN ORGANIZED HEALTH CARE EDUCATION/TRAINING PROGRAM
Payer: COMMERCIAL

## 2025-03-27 DIAGNOSIS — C96.9 T-CELL OR NK-CELL NEOPLASM (HCC): ICD-10-CM

## 2025-03-27 LAB — GLUCOSE BLD-MCNC: 108 MG/DL (ref 65–99)

## 2025-03-27 PROCEDURE — A9552 F18 FDG: HCPCS

## 2025-04-01 ENCOUNTER — RESULTS FOLLOW-UP (OUTPATIENT)
Dept: MEDICAL GROUP | Facility: MEDICAL CENTER | Age: 55
End: 2025-04-01
Payer: COMMERCIAL

## 2025-04-01 LAB — RETINAL SCREEN: NEGATIVE

## 2025-04-02 ENCOUNTER — PRE-ADMISSION TESTING (OUTPATIENT)
Dept: ADMISSIONS | Facility: MEDICAL CENTER | Age: 55
End: 2025-04-02
Attending: STUDENT IN AN ORGANIZED HEALTH CARE EDUCATION/TRAINING PROGRAM
Payer: COMMERCIAL

## 2025-04-02 VITALS — BODY MASS INDEX: 28.03 KG/M2 | HEIGHT: 69 IN

## 2025-04-07 ENCOUNTER — HOSPITAL ENCOUNTER (OUTPATIENT)
Facility: MEDICAL CENTER | Age: 55
End: 2025-04-07
Attending: STUDENT IN AN ORGANIZED HEALTH CARE EDUCATION/TRAINING PROGRAM
Payer: COMMERCIAL

## 2025-04-07 ENCOUNTER — HOSPITAL ENCOUNTER (OUTPATIENT)
Dept: HEMATOLOGY ONCOLOGY | Facility: MEDICAL CENTER | Age: 55
End: 2025-04-07
Attending: STUDENT IN AN ORGANIZED HEALTH CARE EDUCATION/TRAINING PROGRAM
Payer: COMMERCIAL

## 2025-04-07 VITALS
HEIGHT: 69 IN | TEMPERATURE: 98.3 F | BODY MASS INDEX: 28.98 KG/M2 | SYSTOLIC BLOOD PRESSURE: 108 MMHG | HEART RATE: 83 BPM | WEIGHT: 195.66 LBS | OXYGEN SATURATION: 98 % | DIASTOLIC BLOOD PRESSURE: 78 MMHG

## 2025-04-07 DIAGNOSIS — R79.89 ELEVATED FERRITIN: ICD-10-CM

## 2025-04-07 DIAGNOSIS — D70.9 NEUTROPENIA, UNSPECIFIED TYPE (HCC): ICD-10-CM

## 2025-04-07 LAB
ALBUMIN SERPL BCP-MCNC: 4.4 G/DL (ref 3.2–4.9)
ALBUMIN/GLOB SERPL: 1.3 G/DL
ALP SERPL-CCNC: 64 U/L (ref 30–99)
ALT SERPL-CCNC: 21 U/L (ref 2–50)
ANION GAP SERPL CALC-SCNC: 11 MMOL/L (ref 7–16)
AST SERPL-CCNC: 32 U/L (ref 12–45)
BASOPHILS # BLD AUTO: 1 % (ref 0–1.8)
BASOPHILS # BLD: 0.04 K/UL (ref 0–0.12)
BILIRUB SERPL-MCNC: 0.7 MG/DL (ref 0.1–1.5)
BUN SERPL-MCNC: 12 MG/DL (ref 8–22)
CALCIUM ALBUM COR SERPL-MCNC: 9.5 MG/DL (ref 8.5–10.5)
CALCIUM SERPL-MCNC: 9.8 MG/DL (ref 8.5–10.5)
CHLORIDE SERPL-SCNC: 104 MMOL/L (ref 96–112)
CO2 SERPL-SCNC: 23 MMOL/L (ref 20–33)
CREAT SERPL-MCNC: 0.93 MG/DL (ref 0.5–1.4)
EOSINOPHIL # BLD AUTO: 0.11 K/UL (ref 0–0.51)
EOSINOPHIL NFR BLD: 3 % (ref 0–6.9)
ERYTHROCYTE [DISTWIDTH] IN BLOOD BY AUTOMATED COUNT: 57.5 FL (ref 35.9–50)
FASTING STATUS PATIENT QL REPORTED: NORMAL
GFR SERPLBLD CREATININE-BSD FMLA CKD-EPI: 96 ML/MIN/1.73 M 2
GLOBULIN SER CALC-MCNC: 3.5 G/DL (ref 1.9–3.5)
GLUCOSE SERPL-MCNC: 113 MG/DL (ref 65–99)
HCT VFR BLD AUTO: 40.3 % (ref 42–52)
HGB BLD-MCNC: 13.2 G/DL (ref 14–18)
LDH SERPL L TO P-CCNC: 289 U/L (ref 107–266)
LYMPHOCYTES # BLD AUTO: 2.66 K/UL (ref 1–4.8)
LYMPHOCYTES NFR BLD: 70 % (ref 22–41)
MANUAL DIFF BLD: NORMAL
MCH RBC QN AUTO: 29.1 PG (ref 27–33)
MCHC RBC AUTO-ENTMCNC: 32.8 G/DL (ref 32.3–36.5)
MCV RBC AUTO: 88.8 FL (ref 81.4–97.8)
MONOCYTES # BLD AUTO: 0.34 K/UL (ref 0–0.85)
MONOCYTES NFR BLD AUTO: 9 % (ref 0–13.4)
NEUTROPHILS # BLD AUTO: 0.65 K/UL (ref 1.82–7.42)
NEUTROPHILS NFR BLD: 17 % (ref 44–72)
NRBC # BLD AUTO: 0.02 K/UL
NRBC BLD-RTO: 0.5 /100 WBC (ref 0–0.2)
PLATELET # BLD AUTO: 181 K/UL (ref 164–446)
PLATELET BLD QL SMEAR: NORMAL
PMV BLD AUTO: 10.2 FL (ref 9–12.9)
POTASSIUM SERPL-SCNC: 4.9 MMOL/L (ref 3.6–5.5)
PROT SERPL-MCNC: 7.9 G/DL (ref 6–8.2)
RBC # BLD AUTO: 4.54 M/UL (ref 4.7–6.1)
RBC BLD AUTO: NORMAL
SODIUM SERPL-SCNC: 138 MMOL/L (ref 135–145)
URATE SERPL-MCNC: 6.5 MG/DL (ref 2.5–8.3)
WBC # BLD AUTO: 3.8 K/UL (ref 4.8–10.8)

## 2025-04-07 PROCEDURE — 85007 BL SMEAR W/DIFF WBC COUNT: CPT

## 2025-04-07 PROCEDURE — 84550 ASSAY OF BLOOD/URIC ACID: CPT

## 2025-04-07 PROCEDURE — 85027 COMPLETE CBC AUTOMATED: CPT

## 2025-04-07 PROCEDURE — 99214 OFFICE O/P EST MOD 30 MIN: CPT | Performed by: STUDENT IN AN ORGANIZED HEALTH CARE EDUCATION/TRAINING PROGRAM

## 2025-04-07 PROCEDURE — 36415 COLL VENOUS BLD VENIPUNCTURE: CPT

## 2025-04-07 PROCEDURE — 86038 ANTINUCLEAR ANTIBODIES: CPT

## 2025-04-07 PROCEDURE — 80053 COMPREHEN METABOLIC PANEL: CPT

## 2025-04-07 PROCEDURE — 99212 OFFICE O/P EST SF 10 MIN: CPT | Performed by: STUDENT IN AN ORGANIZED HEALTH CARE EDUCATION/TRAINING PROGRAM

## 2025-04-07 PROCEDURE — 83615 LACTATE (LD) (LDH) ENZYME: CPT

## 2025-04-07 ASSESSMENT — FIBROSIS 4 INDEX: FIB4 SCORE: 2.32
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
## 2025-04-08 LAB — NUCLEAR IGG SER QL IA: NORMAL

## 2025-04-10 ENCOUNTER — TELEPHONE (OUTPATIENT)
Dept: HEMATOLOGY ONCOLOGY | Facility: MEDICAL CENTER | Age: 55
End: 2025-04-10
Payer: COMMERCIAL

## 2025-04-10 DIAGNOSIS — C91.Z0 T-CELL LARGE GRANULAR LYMPHOCYTIC LEUKEMIA (HCC): ICD-10-CM

## 2025-04-10 NOTE — TELEPHONE ENCOUNTER
Discussed results with Mr. 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  Port placement cancelled. He will need weekly labs.  Will have him follow up as a virutal visit in 1 month to assess tolerance and response.     Kendal Dumont DO

## 2025-04-11 ENCOUNTER — APPOINTMENT (OUTPATIENT)
Dept: RADIOLOGY | Facility: MEDICAL CENTER | Age: 55
End: 2025-04-11
Attending: STUDENT IN AN ORGANIZED HEALTH CARE EDUCATION/TRAINING PROGRAM
Payer: COMMERCIAL

## 2025-04-14 ENCOUNTER — APPOINTMENT (OUTPATIENT)
Dept: HEMATOLOGY ONCOLOGY | Facility: MEDICAL CENTER | Age: 55
End: 2025-04-14
Attending: STUDENT IN AN ORGANIZED HEALTH CARE EDUCATION/TRAINING PROGRAM
Payer: COMMERCIAL

## 2025-04-14 ENCOUNTER — APPOINTMENT (OUTPATIENT)
Dept: MEDICAL GROUP | Facility: MEDICAL CENTER | Age: 55
End: 2025-04-14
Payer: COMMERCIAL

## 2025-04-16 DIAGNOSIS — C91.Z0 T-CELL LARGE GRANULAR LYMPHOCYTIC LEUKEMIA (HCC): ICD-10-CM

## 2025-04-16 DIAGNOSIS — C96.9 T-CELL OR NK-CELL NEOPLASM (HCC): ICD-10-CM

## 2025-04-17 DIAGNOSIS — C96.9 T-CELL OR NK-CELL NEOPLASM (HCC): ICD-10-CM

## 2025-04-17 DIAGNOSIS — C91.Z0 T-CELL LARGE GRANULAR LYMPHOCYTIC LEUKEMIA (HCC): ICD-10-CM

## 2025-04-17 RX ORDER — METHOTREXATE 2.5 MG/1
10 TABLET ORAL SEE ADMIN INSTRUCTIONS
Qty: 16 TABLET | Refills: 3 | Status: SHIPPED | OUTPATIENT
Start: 2025-04-17

## 2025-04-19 ENCOUNTER — HOSPITAL ENCOUNTER (OUTPATIENT)
Facility: MEDICAL CENTER | Age: 55
End: 2025-04-19
Attending: STUDENT IN AN ORGANIZED HEALTH CARE EDUCATION/TRAINING PROGRAM
Payer: COMMERCIAL

## 2025-04-19 DIAGNOSIS — C91.Z0 T-CELL LARGE GRANULAR LYMPHOCYTIC LEUKEMIA (HCC): ICD-10-CM

## 2025-04-19 LAB
ALBUMIN SERPL BCP-MCNC: 4.3 G/DL (ref 3.2–4.9)
ALBUMIN/GLOB SERPL: 1.3 G/DL
ALP SERPL-CCNC: 60 U/L (ref 30–99)
ALT SERPL-CCNC: 22 U/L (ref 2–50)
ANION GAP SERPL CALC-SCNC: 11 MMOL/L (ref 7–16)
ANISOCYTOSIS BLD QL SMEAR: ABNORMAL
AST SERPL-CCNC: 36 U/L (ref 12–45)
BASOPHILS # BLD AUTO: 0 % (ref 0–1.8)
BASOPHILS # BLD: 0 K/UL (ref 0–0.12)
BILIRUB SERPL-MCNC: 1 MG/DL (ref 0.1–1.5)
BUN SERPL-MCNC: 13 MG/DL (ref 8–22)
BURR CELLS BLD QL SMEAR: NORMAL
CALCIUM ALBUM COR SERPL-MCNC: 9.3 MG/DL (ref 8.5–10.5)
CALCIUM SERPL-MCNC: 9.5 MG/DL (ref 8.5–10.5)
CHLORIDE SERPL-SCNC: 107 MMOL/L (ref 96–112)
CO2 SERPL-SCNC: 21 MMOL/L (ref 20–33)
COMMENT NL1176: NORMAL
CREAT SERPL-MCNC: 1.01 MG/DL (ref 0.5–1.4)
EOSINOPHIL # BLD AUTO: 0.03 K/UL (ref 0–0.51)
EOSINOPHIL NFR BLD: 0.8 % (ref 0–6.9)
ERYTHROCYTE [DISTWIDTH] IN BLOOD BY AUTOMATED COUNT: 54.4 FL (ref 35.9–50)
FASTING STATUS PATIENT QL REPORTED: NORMAL
GFR SERPLBLD CREATININE-BSD FMLA CKD-EPI: 87 ML/MIN/1.73 M 2
GLOBULIN SER CALC-MCNC: 3.3 G/DL (ref 1.9–3.5)
GLUCOSE SERPL-MCNC: 128 MG/DL (ref 65–99)
HCT VFR BLD AUTO: 41 % (ref 42–52)
HGB BLD-MCNC: 13.5 G/DL (ref 14–18)
LYMPHOCYTES # BLD AUTO: 2.8 K/UL (ref 1–4.8)
LYMPHOCYTES NFR BLD: 87.4 % (ref 22–41)
MANUAL DIFF BLD: NORMAL
MCH RBC QN AUTO: 29.3 PG (ref 27–33)
MCHC RBC AUTO-ENTMCNC: 32.9 G/DL (ref 32.3–36.5)
MCV RBC AUTO: 88.9 FL (ref 81.4–97.8)
MICROCYTES BLD QL SMEAR: ABNORMAL
MONOCYTES # BLD AUTO: 0.35 K/UL (ref 0–0.85)
MONOCYTES NFR BLD AUTO: 10.9 % (ref 0–13.4)
NEUTROPHILS # BLD AUTO: 0.03 K/UL (ref 1.82–7.42)
NEUTROPHILS NFR BLD: 0.9 % (ref 44–72)
NRBC # BLD AUTO: 0 K/UL
NRBC BLD-RTO: 0 /100 WBC (ref 0–0.2)
OVALOCYTES BLD QL SMEAR: NORMAL
PLATELET # BLD AUTO: 156 K/UL (ref 164–446)
PLATELET BLD QL SMEAR: NORMAL
PMV BLD AUTO: 10.6 FL (ref 9–12.9)
POIKILOCYTOSIS BLD QL SMEAR: NORMAL
POLYCHROMASIA BLD QL SMEAR: NORMAL
POTASSIUM SERPL-SCNC: 4.7 MMOL/L (ref 3.6–5.5)
PROT SERPL-MCNC: 7.6 G/DL (ref 6–8.2)
RBC # BLD AUTO: 4.61 M/UL (ref 4.7–6.1)
RBC BLD AUTO: PRESENT
SODIUM SERPL-SCNC: 139 MMOL/L (ref 135–145)
VARIANT LYMPHS BLD QL SMEAR: NORMAL
WBC # BLD AUTO: 3.2 K/UL (ref 4.8–10.8)

## 2025-04-19 PROCEDURE — 80053 COMPREHEN METABOLIC PANEL: CPT

## 2025-04-19 PROCEDURE — 85027 COMPLETE CBC AUTOMATED: CPT

## 2025-04-19 PROCEDURE — 36415 COLL VENOUS BLD VENIPUNCTURE: CPT

## 2025-04-19 PROCEDURE — 85007 BL SMEAR W/DIFF WBC COUNT: CPT

## 2025-04-20 NOTE — PROGRESS NOTES
After Hours Call:  Received call from Radha betancourt/ critical lab   Latest Reference Range & Units 03/26/25 09:23 04/07/25 11:03 04/19/25 10:54   Neutrophils (Absolute) 1.82 - 7.42 K/uL 0.20 (LL) 0.65 (L) 0.03 (LL)     Pt is a 53yo M with recent Dx of T cell LGLL, recently started on methotrexate. Previously received granix x1 during admission for severe neutropenia 3/6/35.   I reviewed this information with Dr Dumont, who suspects decline in ANC is treatment related.     Plan:  If any symptoms of infx or fever, advised pt to report to ED for further workup. If afebrile & no S/S of Infx, continue neutropenic precautions.   Granix not recommended at this time per Dr Dumont  Continue weekly labs  I LM for pt on home phone & sent [employer] message with above recommendations, advised pt to call if he has any questions or concerns

## 2025-04-25 ENCOUNTER — TELEPHONE (OUTPATIENT)
Dept: HEMATOLOGY ONCOLOGY | Facility: MEDICAL CENTER | Age: 55
End: 2025-04-25
Payer: COMMERCIAL

## 2025-04-25 ENCOUNTER — HOSPITAL ENCOUNTER (OUTPATIENT)
Facility: MEDICAL CENTER | Age: 55
End: 2025-04-25
Attending: STUDENT IN AN ORGANIZED HEALTH CARE EDUCATION/TRAINING PROGRAM
Payer: COMMERCIAL

## 2025-04-25 DIAGNOSIS — C91.Z0 T-CELL LARGE GRANULAR LYMPHOCYTIC LEUKEMIA (HCC): ICD-10-CM

## 2025-04-25 LAB
ALBUMIN SERPL BCP-MCNC: 4.5 G/DL (ref 3.2–4.9)
ALBUMIN/GLOB SERPL: 1.4 G/DL
ALP SERPL-CCNC: 65 U/L (ref 30–99)
ALT SERPL-CCNC: 39 U/L (ref 2–50)
ANION GAP SERPL CALC-SCNC: 10 MMOL/L (ref 7–16)
ANISOCYTOSIS BLD QL SMEAR: ABNORMAL
AST SERPL-CCNC: 52 U/L (ref 12–45)
BASOPHILS # BLD AUTO: 0 % (ref 0–1.8)
BASOPHILS # BLD: 0 K/UL (ref 0–0.12)
BILIRUB SERPL-MCNC: 1.1 MG/DL (ref 0.1–1.5)
BUN SERPL-MCNC: 15 MG/DL (ref 8–22)
CALCIUM ALBUM COR SERPL-MCNC: 9.3 MG/DL (ref 8.5–10.5)
CALCIUM SERPL-MCNC: 9.7 MG/DL (ref 8.5–10.5)
CHLORIDE SERPL-SCNC: 100 MMOL/L (ref 96–112)
CO2 SERPL-SCNC: 21 MMOL/L (ref 20–33)
COMMENT NL1176: NORMAL
CREAT SERPL-MCNC: 0.97 MG/DL (ref 0.5–1.4)
EOSINOPHIL # BLD AUTO: 0.09 K/UL (ref 0–0.51)
EOSINOPHIL NFR BLD: 2.6 % (ref 0–6.9)
ERYTHROCYTE [DISTWIDTH] IN BLOOD BY AUTOMATED COUNT: 51.6 FL (ref 35.9–50)
GFR SERPLBLD CREATININE-BSD FMLA CKD-EPI: 91 ML/MIN/1.73 M 2
GLOBULIN SER CALC-MCNC: 3.2 G/DL (ref 1.9–3.5)
GLUCOSE SERPL-MCNC: 99 MG/DL (ref 65–99)
HCT VFR BLD AUTO: 39.5 % (ref 42–52)
HGB BLD-MCNC: 13.3 G/DL (ref 14–18)
LYMPHOCYTES # BLD AUTO: 3.1 K/UL (ref 1–4.8)
LYMPHOCYTES NFR BLD: 86.2 % (ref 22–41)
MANUAL DIFF BLD: NORMAL
MCH RBC QN AUTO: 29.6 PG (ref 27–33)
MCHC RBC AUTO-ENTMCNC: 33.7 G/DL (ref 32.3–36.5)
MCV RBC AUTO: 87.8 FL (ref 81.4–97.8)
MICROCYTES BLD QL SMEAR: ABNORMAL
MONOCYTES # BLD AUTO: 0.25 K/UL (ref 0–0.85)
MONOCYTES NFR BLD AUTO: 6.9 % (ref 0–13.4)
MYELOCYTES NFR BLD MANUAL: 0.9 %
NEUTROPHILS # BLD AUTO: 0.12 K/UL (ref 1.82–7.42)
NEUTROPHILS NFR BLD: 3.4 % (ref 44–72)
NRBC # BLD AUTO: 0 K/UL
NRBC BLD-RTO: 0 /100 WBC (ref 0–0.2)
OVALOCYTES BLD QL SMEAR: NORMAL
PLATELET # BLD AUTO: 173 K/UL (ref 164–446)
PLATELET BLD QL SMEAR: NORMAL
PMV BLD AUTO: 10.6 FL (ref 9–12.9)
POIKILOCYTOSIS BLD QL SMEAR: NORMAL
POLYCHROMASIA BLD QL SMEAR: NORMAL
POTASSIUM SERPL-SCNC: 4.7 MMOL/L (ref 3.6–5.5)
PROT SERPL-MCNC: 7.7 G/DL (ref 6–8.2)
RBC # BLD AUTO: 4.5 M/UL (ref 4.7–6.1)
RBC BLD AUTO: PRESENT
SODIUM SERPL-SCNC: 131 MMOL/L (ref 135–145)
VARIANT LYMPHS BLD QL SMEAR: NORMAL
WBC # BLD AUTO: 3.6 K/UL (ref 4.8–10.8)

## 2025-04-25 PROCEDURE — 36415 COLL VENOUS BLD VENIPUNCTURE: CPT

## 2025-04-25 PROCEDURE — 85027 COMPLETE CBC AUTOMATED: CPT

## 2025-04-25 PROCEDURE — 85007 BL SMEAR W/DIFF WBC COUNT: CPT

## 2025-04-25 PROCEDURE — 80053 COMPREHEN METABOLIC PANEL: CPT

## 2025-04-25 NOTE — TELEPHONE ENCOUNTER
Brea from [facility] reported critical lab of ANC = 0.12.  this lab is higher than the prior lab.  MD notified.

## 2025-04-28 ENCOUNTER — TELEPHONE (OUTPATIENT)
Dept: MEDICAL GROUP | Facility: MEDICAL CENTER | Age: 55
End: 2025-04-28
Payer: COMMERCIAL

## 2025-04-28 NOTE — TELEPHONE ENCOUNTER
Pt called in and wanted to talk to you personally about his leave of absence that you guys were talking on [employer] about. I let him know you are busy and seeing patients but that I would relay the message.

## 2025-04-29 NOTE — TELEPHONE ENCOUNTER
I called patient once and did not get an answer.  Left a voicemail.  Please try to reach out to him again this afternoon    Thank You,  Dr. Samuels

## 2025-05-01 DIAGNOSIS — C96.9 T-CELL OR NK-CELL NEOPLASM (HCC): ICD-10-CM

## 2025-05-01 DIAGNOSIS — C91.Z0 T-CELL LARGE GRANULAR LYMPHOCYTIC LEUKEMIA (HCC): ICD-10-CM

## 2025-05-01 RX ORDER — METHOTREXATE 2.5 MG/1
10 TABLET ORAL SEE ADMIN INSTRUCTIONS
Qty: 16 TABLET | Refills: 3 | Status: SHIPPED | OUTPATIENT
Start: 2025-05-01 | End: 2025-05-02

## 2025-05-02 ENCOUNTER — HOSPITAL ENCOUNTER (OUTPATIENT)
Facility: MEDICAL CENTER | Age: 55
End: 2025-05-02
Attending: STUDENT IN AN ORGANIZED HEALTH CARE EDUCATION/TRAINING PROGRAM
Payer: COMMERCIAL

## 2025-05-02 DIAGNOSIS — C91.Z0 T-CELL LARGE GRANULAR LYMPHOCYTIC LEUKEMIA (HCC): ICD-10-CM

## 2025-05-02 LAB
ALBUMIN SERPL BCP-MCNC: 4.5 G/DL (ref 3.2–4.9)
ALBUMIN/GLOB SERPL: 1.5 G/DL
ALP SERPL-CCNC: 63 U/L (ref 30–99)
ALT SERPL-CCNC: 41 U/L (ref 2–50)
ANION GAP SERPL CALC-SCNC: 14 MMOL/L (ref 7–16)
ANISOCYTOSIS BLD QL SMEAR: ABNORMAL
AST SERPL-CCNC: 48 U/L (ref 12–45)
BASOPHILS # BLD AUTO: 0.8 % (ref 0–1.8)
BASOPHILS # BLD: 0.02 K/UL (ref 0–0.12)
BILIRUB SERPL-MCNC: 1.2 MG/DL (ref 0.1–1.5)
BUN SERPL-MCNC: 15 MG/DL (ref 8–22)
CALCIUM ALBUM COR SERPL-MCNC: 9.3 MG/DL (ref 8.5–10.5)
CALCIUM SERPL-MCNC: 9.7 MG/DL (ref 8.5–10.5)
CHLORIDE SERPL-SCNC: 103 MMOL/L (ref 96–112)
CO2 SERPL-SCNC: 20 MMOL/L (ref 20–33)
COMMENT NL1176: NORMAL
CREAT SERPL-MCNC: 0.97 MG/DL (ref 0.5–1.4)
EOSINOPHIL # BLD AUTO: 0.05 K/UL (ref 0–0.51)
EOSINOPHIL NFR BLD: 1.7 % (ref 0–6.9)
ERYTHROCYTE [DISTWIDTH] IN BLOOD BY AUTOMATED COUNT: 50.9 FL (ref 35.9–50)
FASTING STATUS PATIENT QL REPORTED: NORMAL
GFR SERPLBLD CREATININE-BSD FMLA CKD-EPI: 91 ML/MIN/1.73 M 2
GLOBULIN SER CALC-MCNC: 3.1 G/DL (ref 1.9–3.5)
GLUCOSE SERPL-MCNC: 106 MG/DL (ref 65–99)
HCT VFR BLD AUTO: 37.6 % (ref 42–52)
HGB BLD-MCNC: 12.7 G/DL (ref 14–18)
LYMPHOCYTES # BLD AUTO: 2.61 K/UL (ref 1–4.8)
LYMPHOCYTES NFR BLD: 87.1 % (ref 22–41)
MANUAL DIFF BLD: NORMAL
MCH RBC QN AUTO: 29.5 PG (ref 27–33)
MCHC RBC AUTO-ENTMCNC: 33.8 G/DL (ref 32.3–36.5)
MCV RBC AUTO: 87.4 FL (ref 81.4–97.8)
MICROCYTES BLD QL SMEAR: ABNORMAL
MONOCYTES # BLD AUTO: 0.13 K/UL (ref 0–0.85)
MONOCYTES NFR BLD AUTO: 4.3 % (ref 0–13.4)
MYELOCYTES NFR BLD MANUAL: 0.9 %
NEUTROPHILS # BLD AUTO: 0.16 K/UL (ref 1.82–7.42)
NEUTROPHILS NFR BLD: 5.2 % (ref 44–72)
NRBC # BLD AUTO: 0 K/UL
NRBC BLD-RTO: 0 /100 WBC (ref 0–0.2)
OVALOCYTES BLD QL SMEAR: NORMAL
PLATELET # BLD AUTO: 140 K/UL (ref 164–446)
PLATELET BLD QL SMEAR: NORMAL
PMV BLD AUTO: 9.8 FL (ref 9–12.9)
POIKILOCYTOSIS BLD QL SMEAR: NORMAL
POLYCHROMASIA BLD QL SMEAR: NORMAL
POTASSIUM SERPL-SCNC: 4.4 MMOL/L (ref 3.6–5.5)
PROT SERPL-MCNC: 7.6 G/DL (ref 6–8.2)
RBC # BLD AUTO: 4.3 M/UL (ref 4.7–6.1)
RBC BLD AUTO: PRESENT
SODIUM SERPL-SCNC: 137 MMOL/L (ref 135–145)
VARIANT LYMPHS BLD QL SMEAR: NORMAL
WBC # BLD AUTO: 3 K/UL (ref 4.8–10.8)

## 2025-05-02 PROCEDURE — 36415 COLL VENOUS BLD VENIPUNCTURE: CPT

## 2025-05-02 PROCEDURE — 80053 COMPREHEN METABOLIC PANEL: CPT

## 2025-05-02 PROCEDURE — 85007 BL SMEAR W/DIFF WBC COUNT: CPT

## 2025-05-02 PROCEDURE — 85027 COMPLETE CBC AUTOMATED: CPT

## 2025-05-02 RX ORDER — METHOTREXATE 2.5 MG/1
2.5 TABLET ORAL SEE ADMIN INSTRUCTIONS
Qty: 8 TABLET | Refills: 3 | Status: SHIPPED | OUTPATIENT
Start: 2025-05-02 | End: 2025-05-13 | Stop reason: SDUPTHER

## 2025-05-09 ENCOUNTER — HOSPITAL ENCOUNTER (OUTPATIENT)
Facility: MEDICAL CENTER | Age: 55
End: 2025-05-09
Attending: STUDENT IN AN ORGANIZED HEALTH CARE EDUCATION/TRAINING PROGRAM
Payer: COMMERCIAL

## 2025-05-09 DIAGNOSIS — C91.Z0 T-CELL LARGE GRANULAR LYMPHOCYTIC LEUKEMIA (HCC): ICD-10-CM

## 2025-05-09 LAB
ALBUMIN SERPL BCP-MCNC: 4.4 G/DL (ref 3.2–4.9)
ALBUMIN/GLOB SERPL: 1.5 G/DL
ALP SERPL-CCNC: 57 U/L (ref 30–99)
ALT SERPL-CCNC: 42 U/L (ref 2–50)
ANION GAP SERPL CALC-SCNC: 13 MMOL/L (ref 7–16)
ANISOCYTOSIS BLD QL SMEAR: ABNORMAL
AST SERPL-CCNC: 48 U/L (ref 12–45)
BASOPHILS # BLD AUTO: 1.7 % (ref 0–1.8)
BASOPHILS # BLD: 0.04 K/UL (ref 0–0.12)
BILIRUB SERPL-MCNC: 1.1 MG/DL (ref 0.1–1.5)
BUN SERPL-MCNC: 15 MG/DL (ref 8–22)
CALCIUM ALBUM COR SERPL-MCNC: 9.2 MG/DL (ref 8.5–10.5)
CALCIUM SERPL-MCNC: 9.5 MG/DL (ref 8.5–10.5)
CHLORIDE SERPL-SCNC: 105 MMOL/L (ref 96–112)
CO2 SERPL-SCNC: 20 MMOL/L (ref 20–33)
CREAT SERPL-MCNC: 0.95 MG/DL (ref 0.5–1.4)
EOSINOPHIL # BLD AUTO: 0.04 K/UL (ref 0–0.51)
EOSINOPHIL NFR BLD: 1.6 % (ref 0–6.9)
ERYTHROCYTE [DISTWIDTH] IN BLOOD BY AUTOMATED COUNT: 54 FL (ref 35.9–50)
GFR SERPLBLD CREATININE-BSD FMLA CKD-EPI: 93 ML/MIN/1.73 M 2
GLOBULIN SER CALC-MCNC: 2.9 G/DL (ref 1.9–3.5)
GLUCOSE SERPL-MCNC: 105 MG/DL (ref 65–99)
HCT VFR BLD AUTO: 36.3 % (ref 42–52)
HGB BLD-MCNC: 12.3 G/DL (ref 14–18)
LYMPHOCYTES # BLD AUTO: 2.34 K/UL (ref 1–4.8)
LYMPHOCYTES NFR BLD: 90.1 % (ref 22–41)
MANUAL DIFF BLD: NORMAL
MCH RBC QN AUTO: 29.8 PG (ref 27–33)
MCHC RBC AUTO-ENTMCNC: 33.9 G/DL (ref 32.3–36.5)
MCV RBC AUTO: 87.9 FL (ref 81.4–97.8)
MICROCYTES BLD QL SMEAR: ABNORMAL
MONOCYTES # BLD AUTO: 0.09 K/UL (ref 0–0.85)
MONOCYTES NFR BLD AUTO: 3.3 % (ref 0–13.4)
MYELOCYTES NFR BLD MANUAL: 0.8 %
NEUTROPHILS # BLD AUTO: 0.07 K/UL (ref 1.82–7.42)
NEUTROPHILS NFR BLD: 2.5 % (ref 44–72)
NRBC # BLD AUTO: 0 K/UL
NRBC BLD-RTO: 0 /100 WBC (ref 0–0.2)
PLATELET # BLD AUTO: 147 K/UL (ref 164–446)
PLATELET BLD QL SMEAR: NORMAL
PMV BLD AUTO: 9.9 FL (ref 9–12.9)
POTASSIUM SERPL-SCNC: 4.4 MMOL/L (ref 3.6–5.5)
PROT SERPL-MCNC: 7.3 G/DL (ref 6–8.2)
RBC # BLD AUTO: 4.13 M/UL (ref 4.7–6.1)
RBC BLD AUTO: PRESENT
SMUDGE CELLS BLD QL SMEAR: NORMAL
SODIUM SERPL-SCNC: 138 MMOL/L (ref 135–145)
VARIANT LYMPHS BLD QL SMEAR: NORMAL
WBC # BLD AUTO: 2.6 K/UL (ref 4.8–10.8)

## 2025-05-09 PROCEDURE — 85027 COMPLETE CBC AUTOMATED: CPT

## 2025-05-09 PROCEDURE — 85007 BL SMEAR W/DIFF WBC COUNT: CPT

## 2025-05-09 PROCEDURE — 80053 COMPREHEN METABOLIC PANEL: CPT

## 2025-05-09 PROCEDURE — 36415 COLL VENOUS BLD VENIPUNCTURE: CPT

## 2025-05-13 ENCOUNTER — HOSPITAL ENCOUNTER (OUTPATIENT)
Dept: HEMATOLOGY ONCOLOGY | Facility: MEDICAL CENTER | Age: 55
End: 2025-05-13
Attending: STUDENT IN AN ORGANIZED HEALTH CARE EDUCATION/TRAINING PROGRAM
Payer: COMMERCIAL

## 2025-05-13 DIAGNOSIS — D70.8 OTHER NEUTROPENIA (HCC): ICD-10-CM

## 2025-05-13 DIAGNOSIS — C91.Z0 T-CELL LARGE GRANULAR LYMPHOCYTIC LEUKEMIA (HCC): ICD-10-CM

## 2025-05-13 PROCEDURE — 99214 OFFICE O/P EST MOD 30 MIN: CPT | Mod: 95 | Performed by: STUDENT IN AN ORGANIZED HEALTH CARE EDUCATION/TRAINING PROGRAM

## 2025-05-13 RX ORDER — LEVOFLOXACIN 500 MG/1
500 TABLET, FILM COATED ORAL DAILY
Qty: 30 TABLET | Refills: 0 | Status: SHIPPED | OUTPATIENT
Start: 2025-05-13

## 2025-05-13 RX ORDER — FLUCONAZOLE 200 MG/1
400 TABLET ORAL DAILY
Qty: 60 TABLET | Refills: 0 | Status: SHIPPED | OUTPATIENT
Start: 2025-05-13

## 2025-05-13 RX ORDER — METHOTREXATE 2.5 MG/1
TABLET ORAL
Qty: 32 TABLET | Refills: 1 | Status: SHIPPED | OUTPATIENT
Start: 2025-05-13

## 2025-05-13 RX ORDER — ACYCLOVIR 400 MG/1
400 TABLET ORAL 2 TIMES DAILY
Qty: 60 TABLET | Refills: 0 | Status: SHIPPED | OUTPATIENT
Start: 2025-05-13
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
## 2025-05-17 DIAGNOSIS — E78.5 DYSLIPIDEMIA: ICD-10-CM

## 2025-05-18 RX ORDER — ATORVASTATIN CALCIUM 80 MG/1
80 TABLET, FILM COATED ORAL EVERY EVENING
Qty: 90 TABLET | Refills: 0 | Status: SHIPPED | OUTPATIENT
Start: 2025-05-18

## 2025-05-22 ENCOUNTER — HOSPITAL ENCOUNTER (OUTPATIENT)
Facility: MEDICAL CENTER | Age: 55
End: 2025-05-22
Attending: STUDENT IN AN ORGANIZED HEALTH CARE EDUCATION/TRAINING PROGRAM
Payer: COMMERCIAL

## 2025-05-22 LAB
ALBUMIN SERPL BCP-MCNC: 4.4 G/DL (ref 3.2–4.9)
ALBUMIN/GLOB SERPL: 1.4 G/DL
ALP SERPL-CCNC: 54 U/L (ref 30–99)
ALT SERPL-CCNC: 28 U/L (ref 2–50)
ANION GAP SERPL CALC-SCNC: 13 MMOL/L (ref 7–16)
AST SERPL-CCNC: 35 U/L (ref 12–45)
BILIRUB SERPL-MCNC: 0.8 MG/DL (ref 0.1–1.5)
BUN SERPL-MCNC: 14 MG/DL (ref 8–22)
CALCIUM ALBUM COR SERPL-MCNC: 9.4 MG/DL (ref 8.5–10.5)
CALCIUM SERPL-MCNC: 9.7 MG/DL (ref 8.5–10.5)
CHLORIDE SERPL-SCNC: 106 MMOL/L (ref 96–112)
CO2 SERPL-SCNC: 19 MMOL/L (ref 20–33)
CREAT SERPL-MCNC: 1.07 MG/DL (ref 0.5–1.4)
GFR SERPLBLD CREATININE-BSD FMLA CKD-EPI: 81 ML/MIN/1.73 M 2
GLOBULIN SER CALC-MCNC: 3.1 G/DL (ref 1.9–3.5)
GLUCOSE SERPL-MCNC: 108 MG/DL (ref 65–99)
POTASSIUM SERPL-SCNC: 4.1 MMOL/L (ref 3.6–5.5)
PROT SERPL-MCNC: 7.5 G/DL (ref 6–8.2)
SODIUM SERPL-SCNC: 138 MMOL/L (ref 135–145)

## 2025-05-22 PROCEDURE — 80053 COMPREHEN METABOLIC PANEL: CPT

## 2025-05-22 PROCEDURE — 36415 COLL VENOUS BLD VENIPUNCTURE: CPT

## 2025-05-25 DIAGNOSIS — C91.Z0 T-CELL LARGE GRANULAR LYMPHOCYTIC LEUKEMIA (HCC): Primary | ICD-10-CM

## 2025-06-06 DIAGNOSIS — C91.Z0 T-CELL LARGE GRANULAR LYMPHOCYTIC LEUKEMIA (HCC): ICD-10-CM

## 2025-06-10 RX ORDER — ACYCLOVIR 400 MG/1
400 TABLET ORAL 2 TIMES DAILY
Qty: 60 TABLET | Refills: 0 | Status: SHIPPED | OUTPATIENT
Start: 2025-06-10

## 2025-06-12 ENCOUNTER — HOSPITAL ENCOUNTER (OUTPATIENT)
Facility: MEDICAL CENTER | Age: 55
End: 2025-06-12
Attending: STUDENT IN AN ORGANIZED HEALTH CARE EDUCATION/TRAINING PROGRAM
Payer: COMMERCIAL

## 2025-06-12 ENCOUNTER — TELEPHONE (OUTPATIENT)
Facility: MEDICAL CENTER | Age: 55
End: 2025-06-12
Payer: COMMERCIAL

## 2025-06-12 DIAGNOSIS — C91.Z0 T-CELL LARGE GRANULAR LYMPHOCYTIC LEUKEMIA (HCC): ICD-10-CM

## 2025-06-12 LAB
ALBUMIN SERPL BCP-MCNC: 4.4 G/DL (ref 3.2–4.9)
ALBUMIN/GLOB SERPL: 1.5 G/DL
ALP SERPL-CCNC: 46 U/L (ref 30–99)
ALT SERPL-CCNC: 30 U/L (ref 2–50)
ANION GAP SERPL CALC-SCNC: 12 MMOL/L (ref 7–16)
AST SERPL-CCNC: 39 U/L (ref 12–45)
BASOPHILS # BLD AUTO: 0.5 % (ref 0–1.8)
BASOPHILS # BLD: 0.01 K/UL (ref 0–0.12)
BILIRUB SERPL-MCNC: 0.8 MG/DL (ref 0.1–1.5)
BUN SERPL-MCNC: 14 MG/DL (ref 8–22)
CALCIUM ALBUM COR SERPL-MCNC: 9.3 MG/DL (ref 8.5–10.5)
CALCIUM SERPL-MCNC: 9.6 MG/DL (ref 8.5–10.5)
CHLORIDE SERPL-SCNC: 105 MMOL/L (ref 96–112)
CO2 SERPL-SCNC: 21 MMOL/L (ref 20–33)
CREAT SERPL-MCNC: 1.03 MG/DL (ref 0.5–1.4)
EOSINOPHIL # BLD AUTO: 0.03 K/UL (ref 0–0.51)
EOSINOPHIL NFR BLD: 1.6 % (ref 0–6.9)
ERYTHROCYTE [DISTWIDTH] IN BLOOD BY AUTOMATED COUNT: 63.3 FL (ref 35.9–50)
FASTING STATUS PATIENT QL REPORTED: NORMAL
GFR SERPLBLD CREATININE-BSD FMLA CKD-EPI: 85 ML/MIN/1.73 M 2
GLOBULIN SER CALC-MCNC: 2.9 G/DL (ref 1.9–3.5)
GLUCOSE SERPL-MCNC: 96 MG/DL (ref 65–99)
HCT VFR BLD AUTO: 36.5 % (ref 42–52)
HGB BLD-MCNC: 12.4 G/DL (ref 14–18)
IMM GRANULOCYTES # BLD AUTO: 0 K/UL (ref 0–0.11)
IMM GRANULOCYTES NFR BLD AUTO: 0 % (ref 0–0.9)
LYMPHOCYTES # BLD AUTO: 1.33 K/UL (ref 1–4.8)
LYMPHOCYTES NFR BLD: 70.4 % (ref 22–41)
MCH RBC QN AUTO: 31.2 PG (ref 27–33)
MCHC RBC AUTO-ENTMCNC: 34 G/DL (ref 32.3–36.5)
MCV RBC AUTO: 91.9 FL (ref 81.4–97.8)
MONOCYTES # BLD AUTO: 0.41 K/UL (ref 0–0.85)
MONOCYTES NFR BLD AUTO: 21.7 % (ref 0–13.4)
NEUTROPHILS # BLD AUTO: 0.11 K/UL (ref 1.82–7.42)
NEUTROPHILS NFR BLD: 5.8 % (ref 44–72)
NRBC # BLD AUTO: 0 K/UL
NRBC BLD-RTO: 0 /100 WBC (ref 0–0.2)
PLATELET # BLD AUTO: 131 K/UL (ref 164–446)
PMV BLD AUTO: 10.6 FL (ref 9–12.9)
POTASSIUM SERPL-SCNC: 4.6 MMOL/L (ref 3.6–5.5)
PROT SERPL-MCNC: 7.3 G/DL (ref 6–8.2)
RBC # BLD AUTO: 3.97 M/UL (ref 4.7–6.1)
SODIUM SERPL-SCNC: 138 MMOL/L (ref 135–145)
WBC # BLD AUTO: 1.9 K/UL (ref 4.8–10.8)

## 2025-06-12 PROCEDURE — 36415 COLL VENOUS BLD VENIPUNCTURE: CPT

## 2025-06-12 PROCEDURE — 80053 COMPREHEN METABOLIC PANEL: CPT

## 2025-06-12 PROCEDURE — 85025 COMPLETE CBC W/AUTO DIFF WBC: CPT

## 2025-06-12 NOTE — TELEPHONE ENCOUNTER
Fauzia from [facility]upatient lab called with critical WBC and ANC value at 1.9 and 0.11. Result read back to Fauzia. Dr. Dumont notified of critical lab. No interventions at this time. Patient to see Dr. Dumont tomorrow for scheduled appt.

## 2025-06-13 ENCOUNTER — HOSPITAL ENCOUNTER (OUTPATIENT)
Dept: HEMATOLOGY ONCOLOGY | Facility: MEDICAL CENTER | Age: 55
End: 2025-06-13
Attending: STUDENT IN AN ORGANIZED HEALTH CARE EDUCATION/TRAINING PROGRAM
Payer: COMMERCIAL

## 2025-06-13 VITALS
DIASTOLIC BLOOD PRESSURE: 78 MMHG | OXYGEN SATURATION: 100 % | BODY MASS INDEX: 28.8 KG/M2 | HEART RATE: 72 BPM | TEMPERATURE: 97.5 F | WEIGHT: 194.45 LBS | HEIGHT: 69 IN | RESPIRATION RATE: 14 BRPM | SYSTOLIC BLOOD PRESSURE: 112 MMHG

## 2025-06-13 DIAGNOSIS — C91.Z0 T-CELL LARGE GRANULAR LYMPHOCYTIC LEUKEMIA (HCC): ICD-10-CM

## 2025-06-13 DIAGNOSIS — C91.Z0 T-CELL LARGE GRANULAR LYMPHOCYTIC LEUKEMIA (HCC): Primary | ICD-10-CM

## 2025-06-13 PROCEDURE — 99214 OFFICE O/P EST MOD 30 MIN: CPT | Performed by: STUDENT IN AN ORGANIZED HEALTH CARE EDUCATION/TRAINING PROGRAM

## 2025-06-13 PROCEDURE — 99212 OFFICE O/P EST SF 10 MIN: CPT | Performed by: STUDENT IN AN ORGANIZED HEALTH CARE EDUCATION/TRAINING PROGRAM

## 2025-06-13 ASSESSMENT — PAIN SCALES - GENERAL: PAINLEVEL_OUTOF10: NO PAIN

## 2025-06-13 ASSESSMENT — FIBROSIS 4 INDEX: FIB4 SCORE: 3.1
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
## 2025-06-13 NOTE — ADDENDUM NOTE
Encounter addended by: Gillian Talley, Med Ass't on: 6/13/2025 8:52 AM   Actions taken: Charge Capture section accepted

## 2025-06-18 RX ORDER — FLUCONAZOLE 200 MG/1
400 TABLET ORAL DAILY
Qty: 60 TABLET | Refills: 0 | Status: SHIPPED | OUTPATIENT
Start: 2025-06-18

## 2025-06-18 RX ORDER — LEVOFLOXACIN 500 MG/1
500 TABLET, FILM COATED ORAL DAILY
Qty: 30 TABLET | Refills: 0 | Status: SHIPPED | OUTPATIENT
Start: 2025-06-18

## 2025-06-27 ENCOUNTER — HOSPITAL ENCOUNTER (OUTPATIENT)
Facility: MEDICAL CENTER | Age: 55
End: 2025-06-27
Attending: STUDENT IN AN ORGANIZED HEALTH CARE EDUCATION/TRAINING PROGRAM
Payer: COMMERCIAL

## 2025-06-27 DIAGNOSIS — C91.Z0 T-CELL LARGE GRANULAR LYMPHOCYTIC LEUKEMIA (HCC): ICD-10-CM

## 2025-06-27 LAB
ALBUMIN SERPL BCP-MCNC: 4.6 G/DL (ref 3.2–4.9)
ALBUMIN/GLOB SERPL: 1.5 G/DL
ALP SERPL-CCNC: 47 U/L (ref 30–99)
ALT SERPL-CCNC: 88 U/L (ref 2–50)
ANION GAP SERPL CALC-SCNC: 12 MMOL/L (ref 7–16)
ANISOCYTOSIS BLD QL SMEAR: ABNORMAL
AST SERPL-CCNC: 111 U/L (ref 12–45)
BASOPHILS # BLD AUTO: 0 % (ref 0–1.8)
BASOPHILS # BLD: 0 K/UL (ref 0–0.12)
BILIRUB SERPL-MCNC: 0.8 MG/DL (ref 0.1–1.5)
BUN SERPL-MCNC: 13 MG/DL (ref 8–22)
BURR CELLS BLD QL SMEAR: NORMAL
CALCIUM ALBUM COR SERPL-MCNC: 9.5 MG/DL (ref 8.5–10.5)
CALCIUM SERPL-MCNC: 10 MG/DL (ref 8.5–10.5)
CHLORIDE SERPL-SCNC: 105 MMOL/L (ref 96–112)
CO2 SERPL-SCNC: 21 MMOL/L (ref 20–33)
COMMENT NL1176: NORMAL
CREAT SERPL-MCNC: 1.04 MG/DL (ref 0.5–1.4)
EOSINOPHIL # BLD AUTO: 0.04 K/UL (ref 0–0.51)
EOSINOPHIL NFR BLD: 1.7 % (ref 0–6.9)
ERYTHROCYTE [DISTWIDTH] IN BLOOD BY AUTOMATED COUNT: 65.1 FL (ref 35.9–50)
FASTING STATUS PATIENT QL REPORTED: NORMAL
GFR SERPLBLD CREATININE-BSD FMLA CKD-EPI: 84 ML/MIN/1.73 M 2
GLOBULIN SER CALC-MCNC: 3.1 G/DL (ref 1.9–3.5)
GLUCOSE SERPL-MCNC: 98 MG/DL (ref 65–99)
HCT VFR BLD AUTO: 39.9 % (ref 42–52)
HGB BLD-MCNC: 13.1 G/DL (ref 14–18)
LG PLATELETS BLD QL SMEAR: NORMAL
LYMPHOCYTES # BLD AUTO: 2.03 K/UL (ref 1–4.8)
LYMPHOCYTES NFR BLD: 92.2 % (ref 22–41)
MANUAL DIFF BLD: NORMAL
MCH RBC QN AUTO: 30.8 PG (ref 27–33)
MCHC RBC AUTO-ENTMCNC: 32.8 G/DL (ref 32.3–36.5)
MCV RBC AUTO: 93.9 FL (ref 81.4–97.8)
MICROCYTES BLD QL SMEAR: ABNORMAL
MONOCYTES # BLD AUTO: 0.06 K/UL (ref 0–0.85)
MONOCYTES NFR BLD AUTO: 2.6 % (ref 0–13.4)
NEUTROPHILS # BLD AUTO: 0.08 K/UL (ref 1.82–7.42)
NEUTROPHILS NFR BLD: 3.5 % (ref 44–72)
NRBC # BLD AUTO: 0 K/UL
NRBC BLD-RTO: 0 /100 WBC (ref 0–0.2)
OVALOCYTES BLD QL SMEAR: NORMAL
PLATELET # BLD AUTO: 153 K/UL (ref 164–446)
PLATELET BLD QL SMEAR: NORMAL
PMV BLD AUTO: 10.9 FL (ref 9–12.9)
POIKILOCYTOSIS BLD QL SMEAR: NORMAL
POLYCHROMASIA BLD QL SMEAR: NORMAL
POTASSIUM SERPL-SCNC: 5.2 MMOL/L (ref 3.6–5.5)
PROT SERPL-MCNC: 7.7 G/DL (ref 6–8.2)
RBC # BLD AUTO: 4.25 M/UL (ref 4.7–6.1)
RBC BLD AUTO: PRESENT
SODIUM SERPL-SCNC: 138 MMOL/L (ref 135–145)
VARIANT LYMPHS BLD QL SMEAR: NORMAL
WBC # BLD AUTO: 2.2 K/UL (ref 4.8–10.8)

## 2025-06-27 PROCEDURE — 85027 COMPLETE CBC AUTOMATED: CPT

## 2025-06-27 PROCEDURE — 80053 COMPREHEN METABOLIC PANEL: CPT

## 2025-06-27 PROCEDURE — 85007 BL SMEAR W/DIFF WBC COUNT: CPT

## 2025-06-27 PROCEDURE — 36415 COLL VENOUS BLD VENIPUNCTURE: CPT

## 2025-07-22 RX ORDER — SITAGLIPTIN 100 MG/1
100 TABLET, FILM COATED ORAL DAILY
Qty: 90 TABLET | Refills: 0 | Status: SHIPPED | OUTPATIENT
Start: 2025-07-22

## 2025-07-24 ENCOUNTER — TELEPHONE (OUTPATIENT)
Dept: HEMATOLOGY ONCOLOGY | Facility: MEDICAL CENTER | Age: 55
End: 2025-07-24
Payer: COMMERCIAL

## 2025-07-24 ENCOUNTER — HOSPITAL ENCOUNTER (OUTPATIENT)
Facility: MEDICAL CENTER | Age: 55
End: 2025-07-24
Attending: STUDENT IN AN ORGANIZED HEALTH CARE EDUCATION/TRAINING PROGRAM
Payer: COMMERCIAL

## 2025-07-24 DIAGNOSIS — C91.Z0 T-CELL LARGE GRANULAR LYMPHOCYTIC LEUKEMIA (HCC): ICD-10-CM

## 2025-07-24 LAB
ALBUMIN SERPL BCP-MCNC: 4.6 G/DL (ref 3.2–4.9)
ALBUMIN/GLOB SERPL: 1.7 G/DL
ALP SERPL-CCNC: 45 U/L (ref 30–99)
ALT SERPL-CCNC: 34 U/L (ref 2–50)
ANION GAP SERPL CALC-SCNC: 13 MMOL/L (ref 7–16)
ANISOCYTOSIS BLD QL SMEAR: ABNORMAL
AST SERPL-CCNC: 39 U/L (ref 12–45)
BASOPHILS # BLD AUTO: 1.7 % (ref 0–1.8)
BASOPHILS # BLD: 0.03 K/UL (ref 0–0.12)
BILIRUB SERPL-MCNC: 0.9 MG/DL (ref 0.1–1.5)
BUN SERPL-MCNC: 15 MG/DL (ref 8–22)
CALCIUM ALBUM COR SERPL-MCNC: 9 MG/DL (ref 8.5–10.5)
CALCIUM SERPL-MCNC: 9.5 MG/DL (ref 8.5–10.5)
CHLORIDE SERPL-SCNC: 107 MMOL/L (ref 96–112)
CO2 SERPL-SCNC: 21 MMOL/L (ref 20–33)
CREAT SERPL-MCNC: 1.1 MG/DL (ref 0.5–1.4)
EOSINOPHIL # BLD AUTO: 0.06 K/UL (ref 0–0.51)
EOSINOPHIL NFR BLD: 3.4 % (ref 0–6.9)
ERYTHROCYTE [DISTWIDTH] IN BLOOD BY AUTOMATED COUNT: 65.2 FL (ref 35.9–50)
FASTING STATUS PATIENT QL REPORTED: NORMAL
GFR SERPLBLD CREATININE-BSD FMLA CKD-EPI: 78 ML/MIN/1.73 M 2
GLOBULIN SER CALC-MCNC: 2.7 G/DL (ref 1.9–3.5)
GLUCOSE SERPL-MCNC: 87 MG/DL (ref 65–99)
HCT VFR BLD AUTO: 37.1 % (ref 42–52)
HGB BLD-MCNC: 12.5 G/DL (ref 14–18)
LYMPHOCYTES # BLD AUTO: 1.36 K/UL (ref 1–4.8)
LYMPHOCYTES NFR BLD: 71.6 % (ref 22–41)
MANUAL DIFF BLD: NORMAL
MCH RBC QN AUTO: 31.8 PG (ref 27–33)
MCHC RBC AUTO-ENTMCNC: 33.7 G/DL (ref 32.3–36.5)
MCV RBC AUTO: 94.4 FL (ref 81.4–97.8)
MICROCYTES BLD QL SMEAR: ABNORMAL
MONOCYTES # BLD AUTO: 0.23 K/UL (ref 0–0.85)
MONOCYTES NFR BLD AUTO: 12.1 % (ref 0–13.4)
NEUTROPHILS # BLD AUTO: 0.2 K/UL (ref 1.82–7.42)
NEUTROPHILS NFR BLD: 10.3 % (ref 44–72)
NRBC # BLD AUTO: 0 K/UL
NRBC BLD-RTO: 0 /100 WBC (ref 0–0.2)
PLATELET # BLD AUTO: 136 K/UL (ref 164–446)
PLATELET BLD QL SMEAR: NORMAL
PMV BLD AUTO: 10.5 FL (ref 9–12.9)
POTASSIUM SERPL-SCNC: 4.6 MMOL/L (ref 3.6–5.5)
PROMYELOCYTES NFR BLD MANUAL: 0.9 %
PROT SERPL-MCNC: 7.3 G/DL (ref 6–8.2)
RBC # BLD AUTO: 3.93 M/UL (ref 4.7–6.1)
RBC BLD AUTO: PRESENT
SODIUM SERPL-SCNC: 141 MMOL/L (ref 135–145)
WBC # BLD AUTO: 1.9 K/UL (ref 4.8–10.8)

## 2025-07-24 PROCEDURE — 85027 COMPLETE CBC AUTOMATED: CPT

## 2025-07-24 PROCEDURE — 80053 COMPREHEN METABOLIC PANEL: CPT

## 2025-07-24 PROCEDURE — 36415 COLL VENOUS BLD VENIPUNCTURE: CPT

## 2025-07-24 PROCEDURE — 85007 BL SMEAR W/DIFF WBC COUNT: CPT

## 2025-07-24 NOTE — TELEPHONE ENCOUNTER
Received call from [facility] outside lab with Critical Results :    WBC = 1.9  ANC = 0.20    Read back and MD notified.

## 2025-07-25 ENCOUNTER — HOSPITAL ENCOUNTER (OUTPATIENT)
Dept: HEMATOLOGY ONCOLOGY | Facility: MEDICAL CENTER | Age: 55
End: 2025-07-25
Attending: STUDENT IN AN ORGANIZED HEALTH CARE EDUCATION/TRAINING PROGRAM
Payer: COMMERCIAL

## 2025-07-25 VITALS
SYSTOLIC BLOOD PRESSURE: 130 MMHG | DIASTOLIC BLOOD PRESSURE: 72 MMHG | TEMPERATURE: 97.9 F | HEART RATE: 76 BPM | BODY MASS INDEX: 28.72 KG/M2 | HEIGHT: 69 IN | WEIGHT: 193.89 LBS | OXYGEN SATURATION: 100 %

## 2025-07-25 DIAGNOSIS — C91.Z0 T-CELL LARGE GRANULAR LYMPHOCYTIC LEUKEMIA (HCC): ICD-10-CM

## 2025-07-25 DIAGNOSIS — C91.Z0 T-CELL LARGE GRANULAR LYMPHOCYTIC LEUKEMIA (HCC): Primary | ICD-10-CM

## 2025-07-25 DIAGNOSIS — C96.9 T-CELL OR NK-CELL NEOPLASM (HCC): ICD-10-CM

## 2025-07-25 PROCEDURE — 99213 OFFICE O/P EST LOW 20 MIN: CPT | Performed by: STUDENT IN AN ORGANIZED HEALTH CARE EDUCATION/TRAINING PROGRAM

## 2025-07-25 RX ORDER — METHOTREXATE 2.5 MG/1
TABLET ORAL
Qty: 32 TABLET | Refills: 1 | Status: SHIPPED | OUTPATIENT
Start: 2025-07-25

## 2025-07-25 RX ORDER — FLUCONAZOLE 200 MG/1
400 TABLET ORAL DAILY
Qty: 60 TABLET | Refills: 0 | OUTPATIENT
Start: 2025-07-25

## 2025-07-25 RX ORDER — ACYCLOVIR 400 MG/1
400 TABLET ORAL 2 TIMES DAILY
Qty: 60 TABLET | Refills: 0 | Status: SHIPPED | OUTPATIENT
Start: 2025-07-25

## 2025-07-25 RX ORDER — FLUCONAZOLE 200 MG/1
400 TABLET ORAL DAILY
Qty: 60 TABLET | Refills: 0 | Status: SHIPPED | OUTPATIENT
Start: 2025-07-25

## 2025-07-25 RX ORDER — LEVOFLOXACIN 500 MG/1
500 TABLET, FILM COATED ORAL DAILY
Qty: 30 TABLET | Refills: 0 | OUTPATIENT
Start: 2025-07-25

## 2025-07-25 RX ORDER — ACYCLOVIR 400 MG/1
400 TABLET ORAL 2 TIMES DAILY
Qty: 60 TABLET | Refills: 0 | OUTPATIENT
Start: 2025-07-25

## 2025-07-25 RX ORDER — LEVOFLOXACIN 500 MG/1
500 TABLET, FILM COATED ORAL DAILY
Qty: 30 TABLET | Refills: 0 | Status: SHIPPED | OUTPATIENT
Start: 2025-07-25

## 2025-07-25 ASSESSMENT — FIBROSIS 4 INDEX: FIB4 SCORE: 2.8
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
## 2025-07-25 NOTE — ADDENDUM NOTE
Encounter addended by: Mitzi Villareal, Med Ass't on: 7/25/2025 9:48 AM   Actions taken: Charge Capture section accepted

## 2025-08-15 DIAGNOSIS — E78.5 DYSLIPIDEMIA: ICD-10-CM

## 2025-08-15 RX ORDER — ATORVASTATIN CALCIUM 80 MG/1
80 TABLET, FILM COATED ORAL EVERY EVENING
Qty: 90 TABLET | Refills: 0 | Status: SHIPPED | OUTPATIENT
Start: 2025-08-15